# Patient Record
Sex: FEMALE | Race: WHITE | NOT HISPANIC OR LATINO | Employment: FULL TIME | URBAN - METROPOLITAN AREA
[De-identification: names, ages, dates, MRNs, and addresses within clinical notes are randomized per-mention and may not be internally consistent; named-entity substitution may affect disease eponyms.]

---

## 2019-04-23 ENCOUNTER — HISTORICAL (OUTPATIENT)
Dept: LAB | Facility: HOSPITAL | Age: 34
End: 2019-04-23

## 2019-04-23 ENCOUNTER — HISTORICAL (OUTPATIENT)
Dept: PREADMISSION TESTING | Facility: HOSPITAL | Age: 34
End: 2019-04-23

## 2019-04-23 LAB
ABS NEUT (OLG): 3.65 X10(3)/MCL (ref 2.1–9.2)
ALBUMIN SERPL-MCNC: 3.6 GM/DL (ref 3.4–5)
ALBUMIN/GLOB SERPL: 1 RATIO (ref 1.1–2)
ALP SERPL-CCNC: 35 UNIT/L (ref 38–126)
ALT SERPL-CCNC: 21 UNIT/L (ref 12–78)
APTT PPP: 30.9 SECOND(S) (ref 24.2–33.9)
AST SERPL-CCNC: 27 UNIT/L (ref 15–37)
BASOPHILS # BLD AUTO: 0 X10(3)/MCL (ref 0–0.2)
BASOPHILS NFR BLD AUTO: 0 %
BILIRUB SERPL-MCNC: 0.2 MG/DL (ref 0.2–1)
BILIRUBIN DIRECT+TOT PNL SERPL-MCNC: 0.1 MG/DL (ref 0–0.5)
BILIRUBIN DIRECT+TOT PNL SERPL-MCNC: 0.1 MG/DL (ref 0–0.8)
BUN SERPL-MCNC: 11 MG/DL (ref 7–18)
CALCIUM SERPL-MCNC: 9.2 MG/DL (ref 8.5–10.1)
CHLORIDE SERPL-SCNC: 105 MMOL/L (ref 98–107)
CO2 SERPL-SCNC: 27 MMOL/L (ref 21–32)
CREAT SERPL-MCNC: 0.72 MG/DL (ref 0.55–1.02)
EOSINOPHIL # BLD AUTO: 0.2 X10(3)/MCL (ref 0–0.9)
EOSINOPHIL NFR BLD AUTO: 3 %
ERYTHROCYTE [DISTWIDTH] IN BLOOD BY AUTOMATED COUNT: 11.9 % (ref 11.5–17)
GLOBULIN SER-MCNC: 3.7 GM/DL (ref 2.4–3.5)
GLUCOSE SERPL-MCNC: 70 MG/DL (ref 74–106)
HCT VFR BLD AUTO: 40.1 % (ref 37–47)
HGB BLD-MCNC: 13 GM/DL (ref 12–16)
INR PPP: 1 (ref 0–1.3)
LYMPHOCYTES # BLD AUTO: 2.6 X10(3)/MCL (ref 0.6–4.6)
LYMPHOCYTES NFR BLD AUTO: 38 %
MCH RBC QN AUTO: 29.5 PG (ref 27–31)
MCHC RBC AUTO-ENTMCNC: 32.4 GM/DL (ref 33–36)
MCV RBC AUTO: 90.9 FL (ref 80–94)
MONOCYTES # BLD AUTO: 0.5 X10(3)/MCL (ref 0.1–1.3)
MONOCYTES NFR BLD AUTO: 7 %
NEUTROPHILS # BLD AUTO: 3.65 X10(3)/MCL (ref 2.1–9.2)
NEUTROPHILS NFR BLD AUTO: 52 %
PLATELET # BLD AUTO: 254 X10(3)/MCL (ref 130–400)
PMV BLD AUTO: 9.4 FL (ref 9.4–12.4)
POTASSIUM SERPL-SCNC: 3.9 MMOL/L (ref 3.5–5.1)
PROT SERPL-MCNC: 7.3 GM/DL (ref 6.4–8.2)
PROTHROMBIN TIME: 13 SECOND(S) (ref 12–14)
RBC # BLD AUTO: 4.41 X10(6)/MCL (ref 4.2–5.4)
SODIUM SERPL-SCNC: 137 MMOL/L (ref 136–145)
WBC # SPEC AUTO: 7 X10(3)/MCL (ref 4.5–11.5)

## 2019-05-01 ENCOUNTER — HISTORICAL (OUTPATIENT)
Dept: LAB | Facility: HOSPITAL | Age: 34
End: 2019-05-01

## 2019-05-01 ENCOUNTER — HISTORICAL (OUTPATIENT)
Dept: ADMINISTRATIVE | Facility: HOSPITAL | Age: 34
End: 2019-05-01

## 2019-05-02 LAB
GRAM STN SPEC: NORMAL
GRAM STN SPEC: NORMAL

## 2019-05-03 LAB — FINAL CULTURE: NORMAL

## 2019-05-04 LAB
FINAL CULTURE: NO GROWTH
FINAL CULTURE: NORMAL
FINAL CULTURE: NORMAL

## 2019-06-02 LAB — FINAL CULTURE: NORMAL

## 2022-04-30 NOTE — OP NOTE
DATE OF SURGERY:        SURGEON:  Monty Moseley MD    PREOPERATIVE DIAGNOSES:    1. Nasal obstruction.   2. Chronic sinusitis.   3. Nasal polyposis.   4. Turbinate hypertrophy.    POSTOPERATIVE DIAGNOSES:    1. Nasal obstruction.   2. Chronic sinusitis.   3. Nasal polyposis.   4. Turbinate hypertrophy.    PROCEDURE:    1. Septoplasty.   2. Submucous resection of inferior nasal turbinates.   3. Bilateral complete ethmoidectomy, maxillary antrostomies, and sphenoidotomies with removal of contents and biopsies.    ASSISTANT:  Binta Salmon RN    PROCEDURE IN DETAIL:  The patient was brought to the operating room and placed in supine position.  After achieving general endotracheal anesthesia, a throat pack was placed in the oropharynx.  Eyes were protected with Lacri-Lube then 4% cocaine topical adrenaline was used to decongest the nose, and after a sufficient amount of time had lapsed for decongesting, 1% lidocaine with 1:100,000 epinephrine injected into the soft tissues of the nose.  The patient was then prepped and draped for surgery.  A navigation guidance and 0-degree endoscope was then used to examine the nasal cavity.  The left side showed a large polyp emanating from the middle turbinate, as did the right; however, the right was partially obscured by bony deviation of her septum.  Therefore, a septoplasty was performed.  The 15 blade was used to make a transfixion incision.  With use of Iris scissors, 15 blade, and [place] elevator, bilateral mucoperichondrial flaps were elevated overlying the area of the deviation.  A 15 blade was used to make an incision in the quadrilateral cartilage just anterior to the deep bony deviation, which was removed with __________ and Zenon Worley forceps then were used to remove the bony deviation with care being taken to leave appropriate dorsal and caudal support struts.  There was a spur on the floor of the nose on the right, which was removed with a floor  chisel.  Once the airway was improved, 4-0 chromic sutures in an interrupted fashion were used to close the transfixion incision, while 4-0 plain sutures on a Gerard needle were used to re-coapt the mucoperichondrial flaps.  Once this was completed, we turned our attention to the sinus surgery where with a 0-degree endoscope, the left middle turbinate, which was polypoid, was taken down with the shaver.  The middle turbinate then was in-fractured exposing the uncinate process, which was brought forward with a ball-tip seeker, and removed with backbiting forceps and the shaver.  A complete ethmoidectomy was done, removing diseased mucosa throughout the ethmoid cavity.  Maxillary sinus ostia was then identified and enlarged anteriorly with backbiting forceps and posterior Fabiano-Cut forceps, and a large mucous retention cyst/polyp was removed from the maxillary sinus on the left.  The nasal __________ was probed and noted to be intact.  The sphenoidotomy was done and a small amount of the diseased mucosa removed.  The same procedure was done on the opposite side with similar findings.  A Propel nasal stent was placed through the middle turbinate and lateral nasal walls bilaterally.  We then turned our attention to the nasal turbinates, which were in-fractured with Kendall elevator, hydrodissected with 1% lidocaine with 1:100,000 epinephrine.  With a sickle procedure, a stab incision on the anterior inferior edge and with the turbinate attachment to the shaver, submucous resection was done.  The turbinates were then crushed and outfractured.  A Rapid Rhino was placed along the inferior nasal turbinate while a small amount of Achromycin soaked Nu Gauze was used to pack the nose.  The throat pack was removed.  Pharynx carefully suctioned.  Eyes washed with BSS.  The patient tolerated the procedure well, was awakened and extubated in the operating room, and brought to the recovery room in stable  condition.        ______________________________  Monty Moseley MD    [facility]  DD:  05/01/2019  Time:  08:34PM  DT:  05/01/2019  Time:  09:15PM  Job #:  [id number]

## 2023-10-31 ENCOUNTER — OFFICE VISIT (OUTPATIENT)
Dept: FAMILY MEDICINE | Facility: CLINIC | Age: 38
End: 2023-10-31
Payer: COMMERCIAL

## 2023-10-31 VITALS
WEIGHT: 119 LBS | SYSTOLIC BLOOD PRESSURE: 104 MMHG | TEMPERATURE: 99 F | OXYGEN SATURATION: 100 % | RESPIRATION RATE: 16 BRPM | HEART RATE: 79 BPM | HEIGHT: 63 IN | BODY MASS INDEX: 21.09 KG/M2 | DIASTOLIC BLOOD PRESSURE: 72 MMHG

## 2023-10-31 DIAGNOSIS — Z00.00 ANNUAL PHYSICAL EXAM: ICD-10-CM

## 2023-10-31 DIAGNOSIS — G43.109 MIGRAINE WITH AURA AND WITHOUT STATUS MIGRAINOSUS, NOT INTRACTABLE: Primary | ICD-10-CM

## 2023-10-31 DIAGNOSIS — R68.82 LOW LIBIDO: ICD-10-CM

## 2023-10-31 DIAGNOSIS — Z11.4 SCREENING FOR HIV (HUMAN IMMUNODEFICIENCY VIRUS): ICD-10-CM

## 2023-10-31 DIAGNOSIS — F90.0 ATTENTION DEFICIT HYPERACTIVITY DISORDER (ADHD), PREDOMINANTLY INATTENTIVE TYPE: ICD-10-CM

## 2023-10-31 DIAGNOSIS — Z11.59 NEED FOR HEPATITIS C SCREENING TEST: ICD-10-CM

## 2023-10-31 DIAGNOSIS — R03.0 ELEVATED BLOOD PRESSURE READING: ICD-10-CM

## 2023-10-31 PROBLEM — F41.1 ANXIETY STATE: Status: ACTIVE | Noted: 2023-10-31

## 2023-10-31 PROCEDURE — 3008F PR BODY MASS INDEX (BMI) DOCUMENTED: ICD-10-PCS | Mod: CPTII,,, | Performed by: NURSE PRACTITIONER

## 2023-10-31 PROCEDURE — 1159F PR MEDICATION LIST DOCUMENTED IN MEDICAL RECORD: ICD-10-PCS | Mod: CPTII,,, | Performed by: NURSE PRACTITIONER

## 2023-10-31 PROCEDURE — 3008F BODY MASS INDEX DOCD: CPT | Mod: CPTII,,, | Performed by: NURSE PRACTITIONER

## 2023-10-31 PROCEDURE — 99203 PR OFFICE/OUTPT VISIT, NEW, LEVL III, 30-44 MIN: ICD-10-PCS | Mod: ,,, | Performed by: NURSE PRACTITIONER

## 2023-10-31 PROCEDURE — 3078F PR MOST RECENT DIASTOLIC BLOOD PRESSURE < 80 MM HG: ICD-10-PCS | Mod: CPTII,,, | Performed by: NURSE PRACTITIONER

## 2023-10-31 PROCEDURE — 1160F RVW MEDS BY RX/DR IN RCRD: CPT | Mod: CPTII,,, | Performed by: NURSE PRACTITIONER

## 2023-10-31 PROCEDURE — 3074F SYST BP LT 130 MM HG: CPT | Mod: CPTII,,, | Performed by: NURSE PRACTITIONER

## 2023-10-31 PROCEDURE — 99203 OFFICE O/P NEW LOW 30 MIN: CPT | Mod: ,,, | Performed by: NURSE PRACTITIONER

## 2023-10-31 PROCEDURE — 1160F PR REVIEW ALL MEDS BY PRESCRIBER/CLIN PHARMACIST DOCUMENTED: ICD-10-PCS | Mod: CPTII,,, | Performed by: NURSE PRACTITIONER

## 2023-10-31 PROCEDURE — 3074F PR MOST RECENT SYSTOLIC BLOOD PRESSURE < 130 MM HG: ICD-10-PCS | Mod: CPTII,,, | Performed by: NURSE PRACTITIONER

## 2023-10-31 PROCEDURE — 1159F MED LIST DOCD IN RCRD: CPT | Mod: CPTII,,, | Performed by: NURSE PRACTITIONER

## 2023-10-31 PROCEDURE — 3078F DIAST BP <80 MM HG: CPT | Mod: CPTII,,, | Performed by: NURSE PRACTITIONER

## 2023-10-31 RX ORDER — DEXTROAMPHETAMINE SACCHARATE, AMPHETAMINE ASPARTATE, DEXTROAMPHETAMINE SULFATE AND AMPHETAMINE SULFATE 5; 5; 5; 5 MG/1; MG/1; MG/1; MG/1
1 TABLET ORAL 3 TIMES DAILY
COMMUNITY
Start: 2023-10-16 | End: 2024-01-02 | Stop reason: SDUPTHER

## 2023-10-31 RX ORDER — ZOLMITRIPTAN 5 MG/1
5 TABLET, FILM COATED ORAL
Qty: 12 TABLET | Refills: 5 | Status: SHIPPED | OUTPATIENT
Start: 2023-10-31 | End: 2023-12-05

## 2023-10-31 RX ORDER — NORETHINDRONE ACETATE AND ETHINYL ESTRADIOL, AND FERROUS FUMARATE 1MG-20(24)
1 KIT ORAL
COMMUNITY
Start: 2023-10-26 | End: 2024-03-25

## 2023-10-31 RX ORDER — AMLODIPINE BESYLATE 5 MG/1
5 TABLET ORAL
COMMUNITY
Start: 2023-09-14 | End: 2023-10-31

## 2023-10-31 RX ORDER — ZOLMITRIPTAN 5 MG/1
5 TABLET, FILM COATED ORAL
COMMUNITY
Start: 2023-10-16 | End: 2023-10-31 | Stop reason: SDUPTHER

## 2023-10-31 NOTE — LETTER
AUTHORIZATION FOR RELEASE OF   CONFIDENTIAL INFORMATION    Dear Dr. Varma,    We are seeing Joo Haywood, date of birth 1985, in the clinic at [facility]. Alyssa Eddy FNP is the patient's PCP. Joo Haywood has an outstanding lab/procedure at the time we reviewed her chart. In order to help keep her health information updated, she has authorized us to request the following medical record(s):        (  )  MAMMOGRAM                                      (  )  COLONOSCOPY      (X)  PAP SMEAR                                          (X)  OUTSIDE LAB RESULTS     (  )  DEXA SCAN                                          (  )  EYE EXAM            (  )  FOOT EXAM                                          (  )  ENTIRE RECORD     (  )  OUTSIDE IMMUNIZATIONS                 (X)  LAST OFFICE NOTE         Please fax records to [facility], [place], Kathryn F., FNP, [phone]     If you have any questions, please contact us at [phone].        Patient Name: Joo Haywood  : 1985  Patient Phone #: [phone]

## 2023-10-31 NOTE — ASSESSMENT & PLAN NOTE
Refill Zomig today.  Instructed to check her blood pressure the next time she has a migraine prior to taking any medication.  If blood pressure is elevated at all, she is instructed to take sample of either Nurtec or Ubrelvy instead of her Zomig.  Will follow-up in one-month.

## 2023-10-31 NOTE — PROGRESS NOTES
Subjective:       Patient ID: Joo Haywood is a 38 y.o. female.    Chief Complaint: Establish Care and Migraine (Migraines for years, but with the last 2 migraines her BP was elevated and migraine medications did not help.)      HPI   This is a 38-year-old white female who presents to clinic today to establish care.  Patient has an active problem list which includes migraines, ADHD, anxiety.  Reports that a little over a month ago, she had a really bad headache and took her migraine medication in the headache would not go away.  She went to urgent care and blood pressure was really high.  They treated her migraine and her blood pressure got better.  This happened again a few weeks later and they gave her blood pressure medication at the clinic.  Patient reports that she started taking the blood pressure medication on a daily basis and then began having low blood pressures with dizzy and weak spells so she stopped it.  Review of Systems  Comprehensive review of systems negative except as stated in HPI    The patient's Health Maintenance was reviewed and the following appears to be due:   Health Maintenance Due   Topic Date Due    Hepatitis C Screening  Never done    Cervical Cancer Screening  Never done    Lipid Panel  Never done    HIV Screening  Never done       Past Medical History:  Past Medical History:   Diagnosis Date    ADHD (attention deficit hyperactivity disorder)     Migraine      Past Surgical History:   Procedure Laterality Date    CERVICAL CONIZATION   W/ LASER  2013    FESS, USING COMPUTER-ASSISTED NAVIGATION, WITH NASAL TURBINATE REDUCTION  2021    Dr. Stevens    FUNCTIONAL ENDOSCOPIC SINUS SURGERY (FESS)       Review of patient's allergies indicates:  No Known Allergies  Current Outpatient Medications on File Prior to Visit   Medication Sig Dispense Refill    dextroamphetamine-amphetamine (ADDERALL) 20 mg tablet Take 1 tablet by mouth 3 (three) times daily.      KEV Ibrahim FE 1 mg-20 mcg  "(24)/75 mg (4) per tablet Take 1 tablet by mouth.      [DISCONTINUED] amLODIPine (NORVASC) 5 MG tablet Take 5 mg by mouth.      [DISCONTINUED] ZOLMitriptan (ZOMIG) 5 MG tablet Take 5 mg by mouth.       No current facility-administered medications on file prior to visit.     Social History     Socioeconomic History    Marital status: Single   Tobacco Use    Smoking status: Never     Passive exposure: Never    Smokeless tobacco: Never   Substance and Sexual Activity    Alcohol use: Not Currently    Drug use: Never    Sexual activity: Yes     Partners: Male     Birth control/protection: OCP     Family History   Problem Relation Age of Onset    Ulcerative colitis Mother     Heart disease Father        Objective:       /72 (BP Location: Left arm)   Pulse 79   Temp 99.3 °F (37.4 °C) (Temporal)   Resp 16   Ht 5' 3" (1.6 m)   Wt 54 kg (119 lb)   SpO2 100%   BMI 21.08 kg/m²      Physical Exam  Vitals and nursing note reviewed.   Constitutional:       Appearance: Normal appearance.   HENT:      Head: Normocephalic and atraumatic.      Right Ear: Tympanic membrane, ear canal and external ear normal.      Left Ear: Tympanic membrane, ear canal and external ear normal.      Nose: Nose normal.      Mouth/Throat:      Mouth: Mucous membranes are moist.      Pharynx: Oropharynx is clear.   Eyes:      Extraocular Movements: Extraocular movements intact.      Conjunctiva/sclera: Conjunctivae normal.      Pupils: Pupils are equal, round, and reactive to light.   Cardiovascular:      Rate and Rhythm: Normal rate and regular rhythm.      Heart sounds: Normal heart sounds.   Pulmonary:      Effort: Pulmonary effort is normal.      Breath sounds: Normal breath sounds.   Abdominal:      General: Abdomen is flat. Bowel sounds are normal.      Palpations: Abdomen is soft.   Musculoskeletal:         General: Normal range of motion.      Cervical back: Normal range of motion and neck supple.   Skin:     General: Skin is warm and " dry.   Neurological:      General: No focal deficit present.      Mental Status: She is alert and oriented to person, place, and time.   Psychiatric:         Mood and Affect: Mood normal.         Behavior: Behavior normal.         Thought Content: Thought content normal.         Judgment: Judgment normal.         Labs  No visits with results within 6 Month(s) from this visit.   Latest known visit with results is:   Historical on 05/01/2019   Component Date Value Ref Range Status    FINAL CULTURE 05/01/2019 Rare normal simba   Final    FINAL CULTURE 05/01/2019 No growth of Anaerobes   Final    FINAL CULTURE 05/01/2019 Final Report:  At 4 weeks, No Fungus isolated   Final    FINAL CULTURE 05/01/2019 No growth of Anaerobes   Final    FINAL CULTURE 05/01/2019 No growth   Final    GRAM STAIN 05/01/2019 No WBCs, No bacteria seen   Final    GRAM STAIN 05/01/2019 No WBCs, No bacteria seen   Final       Assessment and Plan       ICD-10-CM ICD-9-CM   1. Migraine with aura and without status migrainosus, not intractable  G43.109 346.00   2. Attention deficit hyperactivity disorder (ADHD), predominantly inattentive type  F90.0 314.00   3. Annual physical exam  Z00.00 V70.0   4. Low libido  R68.82 799.81   5. Need for hepatitis C screening test  Z11.59 V73.89   6. Screening for HIV (human immunodeficiency virus)  Z11.4 V73.89   7. Elevated blood pressure reading  R03.0 796.2        1. Migraine with aura and without status migrainosus, not intractable  Overview:  Diagnosed in her 20's by Dr Tabby Manrique not covered by insurance but worked  Imitrex did not help   On Zomig 5 mg as needed     Assessment & Plan:  Refill Zomig today.  Instructed to check her blood pressure the next time she has a migraine prior to taking any medication.  If blood pressure is elevated at all, she is instructed to take sample of either Nurtec or Ubrelvy instead of her Zomig.  Will follow-up in one-month.    Orders:  -     ZOLMitriptan (ZOMIG) 5 MG  tablet; Take 1 tablet (5 mg total) by mouth as needed for Migraine.  Dispense: 12 tablet; Refill: 5    2. Attention deficit hyperactivity disorder (ADHD), predominantly inattentive type  Overview:  DX age 22   Adderall since diagnosis   Adderall 20 mg TID  Managed by Dena Rico, PhD psychology     Assessment & Plan:  Stable on Adderall now for many years, continue follow-up with Psychology.      3. Annual physical exam  -     CBC Auto Differential; Future; Expected date: 10/31/2023  -     Comprehensive Metabolic Panel; Future; Expected date: 10/31/2023  -     Lipid Panel; Future; Expected date: 10/31/2023  -     TSH; Future; Expected date: 10/31/2023  -     Hemoglobin A1C; Future; Expected date: 10/31/2023    4. Low libido  Assessment & Plan:  Will check hormone levels with wellness in about a month.    Orders:  -     Testosterone; Future; Expected date: 10/31/2023  -     Estradiol; Future; Expected date: 10/31/2023  -     Luteinizing Hormone; Future; Expected date: 10/31/2023  -     Follicle Stimulating Hormone; Future; Expected date: 10/31/2023    5. Need for hepatitis C screening test  -     Hepatitis C Antibody; Future; Expected date: 10/31/2023    6. Screening for HIV (human immunodeficiency virus)  -     HIV 1/2 Ag/Ab (4th Gen); Future; Expected date: 10/31/2023    7. Elevated blood pressure reading  Assessment & Plan:  Instructed to monitor blood pressure daily at work and record.  Bring blood pressure log with her to visit in one-month.             Follow up in about 4 weeks (around 11/28/2023) for Annual.

## 2023-10-31 NOTE — ASSESSMENT & PLAN NOTE
Instructed to monitor blood pressure daily at work and record.  Bring blood pressure log with her to visit in one-month.

## 2023-11-21 ENCOUNTER — TELEPHONE (OUTPATIENT)
Dept: FAMILY MEDICINE | Facility: CLINIC | Age: 38
End: 2023-11-21
Payer: COMMERCIAL

## 2023-11-21 NOTE — TELEPHONE ENCOUNTER
No answer on 11/21/23 at 9:30.  Left message reminding patient about her upcoming visit with labs needed prior to visit.

## 2023-11-28 ENCOUNTER — TELEPHONE (OUTPATIENT)
Dept: FAMILY MEDICINE | Facility: CLINIC | Age: 38
End: 2023-11-28
Payer: COMMERCIAL

## 2023-11-30 ENCOUNTER — LAB VISIT (OUTPATIENT)
Dept: LAB | Facility: HOSPITAL | Age: 38
End: 2023-11-30
Attending: NURSE PRACTITIONER
Payer: COMMERCIAL

## 2023-11-30 DIAGNOSIS — R68.82 LOW LIBIDO: ICD-10-CM

## 2023-11-30 DIAGNOSIS — Z00.00 ANNUAL PHYSICAL EXAM: ICD-10-CM

## 2023-11-30 DIAGNOSIS — Z11.4 SCREENING FOR HIV (HUMAN IMMUNODEFICIENCY VIRUS): ICD-10-CM

## 2023-11-30 DIAGNOSIS — Z11.59 NEED FOR HEPATITIS C SCREENING TEST: ICD-10-CM

## 2023-11-30 LAB
ALBUMIN SERPL-MCNC: 3.8 G/DL (ref 3.5–5)
ALBUMIN/GLOB SERPL: 1.1 RATIO (ref 1.1–2)
ALP SERPL-CCNC: 36 UNIT/L (ref 40–150)
ALT SERPL-CCNC: 11 UNIT/L (ref 0–55)
AST SERPL-CCNC: 23 UNIT/L (ref 5–34)
BASOPHILS # BLD AUTO: 0.01 X10(3)/MCL
BASOPHILS NFR BLD AUTO: 0.2 %
BILIRUB SERPL-MCNC: 0.4 MG/DL
BUN SERPL-MCNC: 10.3 MG/DL (ref 7–18.7)
CALCIUM SERPL-MCNC: 9.4 MG/DL (ref 8.4–10.2)
CHLORIDE SERPL-SCNC: 107 MMOL/L (ref 98–107)
CHOLEST SERPL-MCNC: 167 MG/DL
CHOLEST/HDLC SERPL: 3 {RATIO} (ref 0–5)
CO2 SERPL-SCNC: 23 MMOL/L (ref 22–29)
CREAT SERPL-MCNC: 0.83 MG/DL (ref 0.55–1.02)
EOSINOPHIL # BLD AUTO: 0.29 X10(3)/MCL (ref 0–0.9)
EOSINOPHIL NFR BLD AUTO: 5.5 %
ERYTHROCYTE [DISTWIDTH] IN BLOOD BY AUTOMATED COUNT: 12.5 % (ref 11.5–17)
EST. AVERAGE GLUCOSE BLD GHB EST-MCNC: 116.9 MG/DL
ESTRADIOL SERPL HS-MCNC: <24 PG/ML
FSH SERPL-ACNC: 2.01 MIU/ML
GFR SERPLBLD CREATININE-BSD FMLA CKD-EPI: >60 MLS/MIN/1.73/M2
GLOBULIN SER-MCNC: 3.6 GM/DL (ref 2.4–3.5)
GLUCOSE SERPL-MCNC: 102 MG/DL (ref 74–100)
HBA1C MFR BLD: 5.7 %
HCT VFR BLD AUTO: 43.9 % (ref 37–47)
HCV AB SERPL QL IA: NONREACTIVE
HDLC SERPL-MCNC: 52 MG/DL (ref 35–60)
HGB BLD-MCNC: 13.8 G/DL (ref 12–16)
HIV 1+2 AB+HIV1 P24 AG SERPL QL IA: NONREACTIVE
IMM GRANULOCYTES # BLD AUTO: 0 X10(3)/MCL (ref 0–0.04)
IMM GRANULOCYTES NFR BLD AUTO: 0 %
LDLC SERPL CALC-MCNC: 93 MG/DL (ref 50–140)
LH SERPL-ACNC: 0.69 MIU/ML
LYMPHOCYTES # BLD AUTO: 1.86 X10(3)/MCL (ref 0.6–4.6)
LYMPHOCYTES NFR BLD AUTO: 35.1 %
MCH RBC QN AUTO: 28.8 PG (ref 27–31)
MCHC RBC AUTO-ENTMCNC: 31.4 G/DL (ref 33–36)
MCV RBC AUTO: 91.5 FL (ref 80–94)
MONOCYTES # BLD AUTO: 0.24 X10(3)/MCL (ref 0.1–1.3)
MONOCYTES NFR BLD AUTO: 4.5 %
NEUTROPHILS # BLD AUTO: 2.9 X10(3)/MCL (ref 2.1–9.2)
NEUTROPHILS NFR BLD AUTO: 54.7 %
PLATELET # BLD AUTO: 273 X10(3)/MCL (ref 130–400)
PMV BLD AUTO: 8.8 FL (ref 7.4–10.4)
POTASSIUM SERPL-SCNC: 4.1 MMOL/L (ref 3.5–5.1)
PROT SERPL-MCNC: 7.4 GM/DL (ref 6.4–8.3)
RBC # BLD AUTO: 4.8 X10(6)/MCL (ref 4.2–5.4)
SODIUM SERPL-SCNC: 140 MMOL/L (ref 136–145)
TESTOST SERPL-MCNC: 44.34 NG/DL (ref 13.84–53.35)
TRIGL SERPL-MCNC: 111 MG/DL (ref 37–140)
TSH SERPL-ACNC: 1.58 UIU/ML (ref 0.35–4.94)
VLDLC SERPL CALC-MCNC: 22 MG/DL
WBC # SPEC AUTO: 5.3 X10(3)/MCL (ref 4.5–11.5)

## 2023-11-30 PROCEDURE — 85025 COMPLETE CBC W/AUTO DIFF WBC: CPT

## 2023-11-30 PROCEDURE — 80053 COMPREHEN METABOLIC PANEL: CPT

## 2023-11-30 PROCEDURE — 87389 HIV-1 AG W/HIV-1&-2 AB AG IA: CPT

## 2023-11-30 PROCEDURE — 36415 COLL VENOUS BLD VENIPUNCTURE: CPT

## 2023-11-30 PROCEDURE — 80061 LIPID PANEL: CPT

## 2023-11-30 PROCEDURE — 83001 ASSAY OF GONADOTROPIN (FSH): CPT

## 2023-11-30 PROCEDURE — 86803 HEPATITIS C AB TEST: CPT

## 2023-11-30 PROCEDURE — 82670 ASSAY OF TOTAL ESTRADIOL: CPT

## 2023-11-30 PROCEDURE — 83002 ASSAY OF GONADOTROPIN (LH): CPT

## 2023-11-30 PROCEDURE — 84443 ASSAY THYROID STIM HORMONE: CPT

## 2023-11-30 PROCEDURE — 83036 HEMOGLOBIN GLYCOSYLATED A1C: CPT

## 2023-11-30 PROCEDURE — 84403 ASSAY OF TOTAL TESTOSTERONE: CPT

## 2023-12-05 ENCOUNTER — OFFICE VISIT (OUTPATIENT)
Dept: FAMILY MEDICINE | Facility: CLINIC | Age: 38
End: 2023-12-05
Payer: COMMERCIAL

## 2023-12-05 VITALS
BODY MASS INDEX: 21.3 KG/M2 | DIASTOLIC BLOOD PRESSURE: 82 MMHG | TEMPERATURE: 98 F | WEIGHT: 120.19 LBS | OXYGEN SATURATION: 99 % | RESPIRATION RATE: 16 BRPM | HEART RATE: 61 BPM | HEIGHT: 63 IN | SYSTOLIC BLOOD PRESSURE: 128 MMHG

## 2023-12-05 DIAGNOSIS — R73.03 PREDIABETES: ICD-10-CM

## 2023-12-05 DIAGNOSIS — Z12.31 BREAST CANCER SCREENING BY MAMMOGRAM: ICD-10-CM

## 2023-12-05 DIAGNOSIS — Z12.4 CERVICAL CANCER SCREENING: ICD-10-CM

## 2023-12-05 DIAGNOSIS — R03.0 ELEVATED BLOOD PRESSURE READING: ICD-10-CM

## 2023-12-05 DIAGNOSIS — Z00.00 ANNUAL PHYSICAL EXAM: Primary | ICD-10-CM

## 2023-12-05 DIAGNOSIS — F90.0 ATTENTION DEFICIT HYPERACTIVITY DISORDER (ADHD), PREDOMINANTLY INATTENTIVE TYPE: ICD-10-CM

## 2023-12-05 DIAGNOSIS — G43.109 MIGRAINE WITH AURA AND WITHOUT STATUS MIGRAINOSUS, NOT INTRACTABLE: ICD-10-CM

## 2023-12-05 DIAGNOSIS — Z11.59 NEED FOR HEPATITIS C SCREENING TEST: ICD-10-CM

## 2023-12-05 DIAGNOSIS — R68.82 LOW LIBIDO: ICD-10-CM

## 2023-12-05 DIAGNOSIS — Z11.4 SCREENING FOR HIV (HUMAN IMMUNODEFICIENCY VIRUS): ICD-10-CM

## 2023-12-05 PROCEDURE — 1159F MED LIST DOCD IN RCRD: CPT | Mod: CPTII,,, | Performed by: NURSE PRACTITIONER

## 2023-12-05 PROCEDURE — 3074F PR MOST RECENT SYSTOLIC BLOOD PRESSURE < 130 MM HG: ICD-10-PCS | Mod: CPTII,,, | Performed by: NURSE PRACTITIONER

## 2023-12-05 PROCEDURE — 3079F DIAST BP 80-89 MM HG: CPT | Mod: CPTII,,, | Performed by: NURSE PRACTITIONER

## 2023-12-05 PROCEDURE — 3074F SYST BP LT 130 MM HG: CPT | Mod: CPTII,,, | Performed by: NURSE PRACTITIONER

## 2023-12-05 PROCEDURE — 3079F PR MOST RECENT DIASTOLIC BLOOD PRESSURE 80-89 MM HG: ICD-10-PCS | Mod: CPTII,,, | Performed by: NURSE PRACTITIONER

## 2023-12-05 PROCEDURE — 1160F PR REVIEW ALL MEDS BY PRESCRIBER/CLIN PHARMACIST DOCUMENTED: ICD-10-PCS | Mod: CPTII,,, | Performed by: NURSE PRACTITIONER

## 2023-12-05 PROCEDURE — 99395 PR PREVENTIVE VISIT,EST,18-39: ICD-10-PCS | Mod: ,,, | Performed by: NURSE PRACTITIONER

## 2023-12-05 PROCEDURE — 99395 PREV VISIT EST AGE 18-39: CPT | Mod: ,,, | Performed by: NURSE PRACTITIONER

## 2023-12-05 PROCEDURE — 1159F PR MEDICATION LIST DOCUMENTED IN MEDICAL RECORD: ICD-10-PCS | Mod: CPTII,,, | Performed by: NURSE PRACTITIONER

## 2023-12-05 PROCEDURE — 1160F RVW MEDS BY RX/DR IN RCRD: CPT | Mod: CPTII,,, | Performed by: NURSE PRACTITIONER

## 2023-12-05 PROCEDURE — 3008F BODY MASS INDEX DOCD: CPT | Mod: CPTII,,, | Performed by: NURSE PRACTITIONER

## 2023-12-05 PROCEDURE — 3008F PR BODY MASS INDEX (BMI) DOCUMENTED: ICD-10-PCS | Mod: CPTII,,, | Performed by: NURSE PRACTITIONER

## 2023-12-05 PROCEDURE — 3044F HG A1C LEVEL LT 7.0%: CPT | Mod: CPTII,,, | Performed by: NURSE PRACTITIONER

## 2023-12-05 PROCEDURE — 3044F PR MOST RECENT HEMOGLOBIN A1C LEVEL <7.0%: ICD-10-PCS | Mod: CPTII,,, | Performed by: NURSE PRACTITIONER

## 2023-12-05 RX ORDER — RIMEGEPANT SULFATE 75 MG/75MG
75 TABLET, ORALLY DISINTEGRATING ORAL DAILY PRN
Qty: 16 TABLET | Refills: 11 | Status: SHIPPED | OUTPATIENT
Start: 2023-12-05

## 2023-12-05 NOTE — LETTER
AUTHORIZATION FOR RELEASE OF   CONFIDENTIAL INFORMATION    Dear Dr. Varma,    We are seeing Joo Haywood, date of birth 1985, in the clinic at [facility]. Alyssa Eddy FNP is the patient's PCP. Joo Haywood has an outstanding lab/procedure at the time we reviewed her chart. In order to help keep her health information updated, she has authorized us to request the following medical record(s):        (  )  MAMMOGRAM                                      (  )  COLONOSCOPY      ( x )  PAP SMEAR                                          (  )  OUTSIDE LAB RESULTS     (  )  DEXA SCAN                                          (  )  EYE EXAM            (  )  FOOT EXAM                                          (  )  ENTIRE RECORD     (  )  OUTSIDE IMMUNIZATIONS                 (  )  _______________         Please fax records to [facility], [place], Kathryn F., [place], [phone]          Patient Name: Joo Haywood  : 1985  Patient Phone #: [phone]

## 2023-12-05 NOTE — ASSESSMENT & PLAN NOTE
Patient states she tried the samples of Nurtec and a worked extremely well.  Had complete resolve meant of all migraine symptoms within an hour.  Instructed to discontinue the Zomig and start Nurtec as needed for abortive therapy.  Follow-up 1 year.

## 2023-12-05 NOTE — ASSESSMENT & PLAN NOTE
Managed by psych.  Did discuss with patient that she can see me every 3 months for medication management if she would prefer, patient will think about it and let me know.

## 2023-12-05 NOTE — PROGRESS NOTES
Subjective:       Patient ID: Joo Haywood is a 38 y.o. female.    Chief Complaint: Annual Exam      HPI   This is a 38-year-old white female who presents to clinic today for an annual wellness exam.  Patient reports that the Nurtec really helped her migraine.  The Ubrelvy did not help at all.    Review of Systems  Comprehensive review of systems negative except as stated in HPI    The patient's Health Maintenance was reviewed and the following appears to be due:   Health Maintenance Due   Topic Date Due    Cervical Cancer Screening  Never done       Past Medical History:  Past Medical History:   Diagnosis Date    ADHD (attention deficit hyperactivity disorder)     Migraine      Past Surgical History:   Procedure Laterality Date    CERVICAL CONIZATION   W/ LASER  2013    FESS, USING COMPUTER-ASSISTED NAVIGATION, WITH NASAL TURBINATE REDUCTION  2021    Dr. Stevens    FUNCTIONAL ENDOSCOPIC SINUS SURGERY (FESS)       Review of patient's allergies indicates:  No Known Allergies  Current Outpatient Medications on File Prior to Visit   Medication Sig Dispense Refill    dextroamphetamine-amphetamine (ADDERALL) 20 mg tablet Take 1 tablet by mouth 3 (three) times daily.      KEV 24 FE 1 mg-20 mcg (24)/75 mg (4) per tablet Take 1 tablet by mouth.      [DISCONTINUED] ZOLMitriptan (ZOMIG) 5 MG tablet Take 1 tablet (5 mg total) by mouth as needed for Migraine. 12 tablet 5     No current facility-administered medications on file prior to visit.     Social History     Socioeconomic History    Marital status: Single   Tobacco Use    Smoking status: Never     Passive exposure: Never    Smokeless tobacco: Never   Substance and Sexual Activity    Alcohol use: Not Currently    Drug use: Never    Sexual activity: Yes     Partners: Male     Birth control/protection: OCP     Social Determinants of Health     Financial Resource Strain: Low Risk  (12/5/2023)    Overall Financial Resource Strain (CARDIA)     Difficulty of Paying  "Living Expenses: Not very hard   Food Insecurity: No Food Insecurity (12/5/2023)    Hunger Vital Sign     Worried About Running Out of Food in the Last Year: Never true     Ran Out of Food in the Last Year: Never true   Transportation Needs: No Transportation Needs (12/5/2023)    PRAPARE - Transportation     Lack of Transportation (Medical): No     Lack of Transportation (Non-Medical): No   Physical Activity: Inactive (12/5/2023)    Exercise Vital Sign     Days of Exercise per Week: 0 days     Minutes of Exercise per Session: 0 min   Stress: Stress Concern Present (12/5/2023)    [employer] - Occupational Stress Questionnaire     Feeling of Stress : To some extent   Social Connections: Moderately Isolated (12/5/2023)    Social Connection and Isolation Panel [NHANES]     Frequency of Communication with Friends and Family: More than three times a week     Frequency of Social Gatherings with Friends and Family: More than three times a week     Attends Zoroastrianism Services: Never     Active Member of Clubs or Organizations: No     Attends Club or Organization Meetings: 1 to 4 times per year     Marital Status:    Housing Stability: Low Risk  (12/5/2023)    Housing Stability Vital Sign     Unable to Pay for Housing in the Last Year: No     Number of Places Lived in the Last Year: 1     Unstable Housing in the Last Year: No     Family History   Problem Relation Age of Onset    Ulcerative colitis Mother     Heart disease Father        Objective:       /82   Pulse 61   Temp 98.2 °F (36.8 °C)   Resp 16   Ht 5' 3" (1.6 m)   Wt 54.5 kg (120 lb 3.2 oz)   SpO2 99%   BMI 21.29 kg/m²      Physical Exam  Vitals and nursing note reviewed.   Constitutional:       Appearance: Normal appearance. She is normal weight.   HENT:      Head: Normocephalic and atraumatic.      Right Ear: Tympanic membrane, ear canal and external ear normal.      Left Ear: Tympanic membrane, ear canal and " external ear normal.      Nose: Nose normal.      Mouth/Throat:      Mouth: Mucous membranes are moist.      Pharynx: Oropharynx is clear.   Eyes:      Extraocular Movements: Extraocular movements intact.      Conjunctiva/sclera: Conjunctivae normal.      Pupils: Pupils are equal, round, and reactive to light.   Cardiovascular:      Rate and Rhythm: Normal rate and regular rhythm.      Heart sounds: Normal heart sounds.   Pulmonary:      Effort: Pulmonary effort is normal.      Breath sounds: Normal breath sounds.   Abdominal:      General: Abdomen is flat. Bowel sounds are normal.      Palpations: Abdomen is soft.   Musculoskeletal:         General: Normal range of motion.      Cervical back: Normal range of motion and neck supple.   Skin:     General: Skin is warm and dry.   Neurological:      General: No focal deficit present.      Mental Status: She is alert and oriented to person, place, and time.   Psychiatric:         Mood and Affect: Mood normal.         Behavior: Behavior normal.         Thought Content: Thought content normal.         Judgment: Judgment normal.         Labs  Lab Visit on 11/30/2023   Component Date Value Ref Range Status    Sodium Level 11/30/2023 140  136 - 145 mmol/L Final    Potassium Level 11/30/2023 4.1  3.5 - 5.1 mmol/L Final    Chloride 11/30/2023 107  98 - 107 mmol/L Final    Carbon Dioxide 11/30/2023 23  22 - 29 mmol/L Final    Glucose Level 11/30/2023 102 (H)  74 - 100 mg/dL Final    Blood Urea Nitrogen 11/30/2023 10.3  7.0 - 18.7 mg/dL Final    Creatinine 11/30/2023 0.83  0.55 - 1.02 mg/dL Final    Calcium Level Total 11/30/2023 9.4  8.4 - 10.2 mg/dL Final    Protein Total 11/30/2023 7.4  6.4 - 8.3 gm/dL Final    Albumin Level 11/30/2023 3.8  3.5 - 5.0 g/dL Final    Globulin 11/30/2023 3.6 (H)  2.4 - 3.5 gm/dL Final    Albumin/Globulin Ratio 11/30/2023 1.1  1.1 - 2.0 ratio Final    Bilirubin Total 11/30/2023 0.4  <=1.5 mg/dL Final    Alkaline Phosphatase 11/30/2023 36 (L)  40 -  150 unit/L Final    Alanine Aminotransferase 11/30/2023 11  0 - 55 unit/L Final    Aspartate Aminotransferase 11/30/2023 23  5 - 34 unit/L Final    eGFR 11/30/2023 >60  mls/min/1.73/m2 Final    Cholesterol Total 11/30/2023 167  <=200 mg/dL Final    HDL Cholesterol 11/30/2023 52  35 - 60 mg/dL Final    Triglyceride 11/30/2023 111  37 - 140 mg/dL Final    Cholesterol/HDL Ratio 11/30/2023 3  0 - 5 Final    Very Low Density Lipoprotein 11/30/2023 22   Final    LDL Cholesterol 11/30/2023 93.00  50.00 - 140.00 mg/dL Final    TSH 11/30/2023 1.584  0.350 - 4.940 uIU/mL Final    Hemoglobin A1c 11/30/2023 5.7  <=7.0 % Final    Estimated Average Glucose 11/30/2023 116.9  mg/dL Final    Hep C Ab Interp 11/30/2023 Nonreactive  Nonreactive Final    HIV 11/30/2023 Nonreactive  Nonreactive Final    Testosterone Total 11/30/2023 44.34  13.84 - 53.35 ng/dL Final    Estradiol Level 11/30/2023 <24  pg/mL Final    Estradiol Expected Values:    Menstruating females:     Follicular phase    pg/ml     Mid-cycle phase    pg/ml     Luteal phase    pg/ml    Post-menopausal females:     On Menopausal Hormone Therapy (MHT) < pg/ml     Untreated    <10-28 pg/ml    Males    11-44 pg/ml      Luteinizing Hormone 11/30/2023 0.69  mIU/mL Final    Luteinizing Hormone Expected Values:    Normal Menstruating Females:    Follicular Phase:   1.8-11.78   Mid-cycle Phase:   7.59-89.08   Luteal Phase:   0.56-14.00    Postmenopausal Females:   Without HRT:   5.16-61.99    Males     0.57-12.07               Follicle Stimulating Hormone 11/30/2023 2.01  mIU/mL Final    Follicle Stimulating Hormone Expected Values:      Normal Menstruating Females:  Follicular    3.03-8.08  Mid Cycle Peak  2.55-16.69  Luteal Phase  1.38-5.47    Postmenopausal Females: 26..41    Males   0.95 - 11.95        WBC 11/30/2023 5.30  4.50 - 11.50 x10(3)/mcL Final    RBC 11/30/2023 4.80  4.20 - 5.40 x10(6)/mcL Final    Hgb 11/30/2023 13.8  12.0 - 16.0 g/dL  Final    Hct 11/30/2023 43.9  37.0 - 47.0 % Final    MCV 11/30/2023 91.5  80.0 - 94.0 fL Final    MCH 11/30/2023 28.8  27.0 - 31.0 pg Final    MCHC 11/30/2023 31.4 (L)  33.0 - 36.0 g/dL Final    RDW 11/30/2023 12.5  11.5 - 17.0 % Final    Platelet 11/30/2023 273  130 - 400 x10(3)/mcL Final    MPV 11/30/2023 8.8  7.4 - 10.4 fL Final    Neut % 11/30/2023 54.7  % Final    Lymph % 11/30/2023 35.1  % Final    Mono % 11/30/2023 4.5  % Final    Eos % 11/30/2023 5.5  % Final    Basophil % 11/30/2023 0.2  % Final    Lymph # 11/30/2023 1.86  0.6 - 4.6 x10(3)/mcL Final    Neut # 11/30/2023 2.90  2.1 - 9.2 x10(3)/mcL Final    Mono # 11/30/2023 0.24  0.1 - 1.3 x10(3)/mcL Final    Eos # 11/30/2023 0.29  0 - 0.9 x10(3)/mcL Final    Baso # 11/30/2023 0.01  <=0.2 x10(3)/mcL Final    IG# 11/30/2023 0.00  0 - 0.04 x10(3)/mcL Final    IG% 11/30/2023 0.0  % Final       Assessment and Plan       ICD-10-CM ICD-9-CM   1. Annual physical exam  Z00.00 V70.0   2. Need for hepatitis C screening test  Z11.59 V73.89   3. Screening for HIV (human immunodeficiency virus)  Z11.4 V73.89   4. Low libido  R68.82 799.81   5. Migraine with aura and without status migrainosus, not intractable  G43.109 346.00   6. Attention deficit hyperactivity disorder (ADHD), predominantly inattentive type  F90.0 314.00   7. Elevated blood pressure reading  R03.0 796.2   8. Breast cancer screening by mammogram  Z12.31 V76.12   9. Cervical cancer screening  Z12.4 V76.2   10. Prediabetes  R73.03 790.29        1. Annual physical exam  Overview:  Annual wellness exam yearly in December    Orders:  -     CBC Auto Differential; Future; Expected date: 12/05/2024  -     Comprehensive Metabolic Panel; Future; Expected date: 12/05/2024  -     Lipid Panel; Future; Expected date: 12/05/2024  -     TSH; Future; Expected date: 12/05/2024  -     Hemoglobin A1C; Future; Expected date: 12/05/2024    2. Need for hepatitis C screening test  Comments:  Nonreactive    3. Screening for HIV  (human immunodeficiency virus)  Comments:  Nonreactive    4. Low libido  Assessment & Plan:  Testosterone normal at 44.34.  Other hormone levels checked, patient currently on birth control, levels are as expected for birth control.  Needs new gyn, her previous gyn retired, would like referral to Dr. Pabon      5. Migraine with aura and without status migrainosus, not intractable  Overview:  Diagnosed in her 20's by Dr Tabby Manrique not covered by insurance but worked  Imitrex did not help   On Zomig 5 mg as needed but has side effects  12/05/2023 - start Nurtec 75 mg ODT    Assessment & Plan:  Patient states she tried the samples of Nurtec and a worked extremely well.  Had complete resolve meant of all migraine symptoms within an hour.  Instructed to discontinue the Zomig and start Nurtec as needed for abortive therapy.  Follow-up 1 year.    Orders:  -     rimegepant (NURTEC) 75 mg odt; Take 1 tablet (75 mg total) by mouth daily as needed for Migraine. Place ODT tablet on the tongue; alternatively the ODT tablet may be placed under the tongue  Dispense: 16 tablet; Refill: 11    6. Attention deficit hyperactivity disorder (ADHD), predominantly inattentive type  Overview:  DX age 22   Adderall since diagnosis   Adderall 20 mg TID  Managed by Dena Rico, PhD psychology     Assessment & Plan:  Managed by psych.  Did discuss with patient that she can see me every 3 months for medication management if she would prefer, patient will think about it and let me know.      7. Elevated blood pressure reading  Assessment & Plan:  Blood pressure normal today, has not had any recent abnormal at home or at work either.  Continue to monitor.      8. Breast cancer screening by mammogram  Assessment & Plan:  Baseline mammogram ordered to be completed at [facility] [place] after patient turns 39 in March.    Orders:  -     Mammo Digital Screening Bilat w/ Harsha; Future; Expected date: 03/25/2024    9. Cervical  cancer screening  -     Ambulatory referral/consult to Gynecology; Future; Expected date: 12/12/2023    10. Prediabetes  Assessment & Plan:  Fasting glucose elevated, hemoglobin A1c noted to be 5.7.  Patient does admit to drinking a full glass of whole milk chocolate milk every morning and evening.  Instructed to cut this out or cut back on this and monitor sugar and carbohydrate intake, will recheck in 1 year.  Patient verbalized understanding.    Orders:  -     Hemoglobin A1C; Future; Expected date: 12/05/2024           Follow up in about 1 year (around 12/5/2024) for Annual.

## 2023-12-05 NOTE — ASSESSMENT & PLAN NOTE
Testosterone normal at 44.34.  Other hormone levels checked, patient currently on birth control, levels are as expected for birth control.  Needs new gyn, her previous gyn retired, would like referral to Dr. Pabon

## 2023-12-05 NOTE — ASSESSMENT & PLAN NOTE
Baseline mammogram ordered to be completed at [facility] [place] after patient turns 39 in March.

## 2023-12-05 NOTE — ASSESSMENT & PLAN NOTE
Blood pressure normal today, has not had any recent abnormal at home or at work either.  Continue to monitor.

## 2023-12-05 NOTE — ASSESSMENT & PLAN NOTE
Fasting glucose elevated, hemoglobin A1c noted to be 5.7.  Patient does admit to drinking a full glass of whole milk chocolate milk every morning and evening.  Instructed to cut this out or cut back on this and monitor sugar and carbohydrate intake, will recheck in 1 year.  Patient verbalized understanding.

## 2023-12-07 ENCOUNTER — PATIENT MESSAGE (OUTPATIENT)
Dept: FAMILY MEDICINE | Facility: CLINIC | Age: 38
End: 2023-12-07
Payer: COMMERCIAL

## 2023-12-07 DIAGNOSIS — R68.82 LOW LIBIDO: Primary | ICD-10-CM

## 2023-12-20 ENCOUNTER — PATIENT MESSAGE (OUTPATIENT)
Dept: FAMILY MEDICINE | Facility: CLINIC | Age: 38
End: 2023-12-20
Payer: COMMERCIAL

## 2024-01-02 ENCOUNTER — PATIENT MESSAGE (OUTPATIENT)
Dept: FAMILY MEDICINE | Facility: CLINIC | Age: 39
End: 2024-01-02
Payer: COMMERCIAL

## 2024-01-02 DIAGNOSIS — F90.0 ATTENTION DEFICIT HYPERACTIVITY DISORDER (ADHD), PREDOMINANTLY INATTENTIVE TYPE: Primary | ICD-10-CM

## 2024-01-02 RX ORDER — DEXTROAMPHETAMINE SACCHARATE, AMPHETAMINE ASPARTATE, DEXTROAMPHETAMINE SULFATE AND AMPHETAMINE SULFATE 5; 5; 5; 5 MG/1; MG/1; MG/1; MG/1
1 TABLET ORAL 3 TIMES DAILY
Qty: 90 TABLET | Refills: 0 | Status: SHIPPED | OUTPATIENT
Start: 2024-01-02 | End: 2024-02-01 | Stop reason: SDUPTHER

## 2024-01-09 ENCOUNTER — PATIENT MESSAGE (OUTPATIENT)
Dept: FAMILY MEDICINE | Facility: CLINIC | Age: 39
End: 2024-01-09
Payer: COMMERCIAL

## 2024-01-09 DIAGNOSIS — N63.0 MASS OF BREAST, UNSPECIFIED LATERALITY: Primary | ICD-10-CM

## 2024-01-09 DIAGNOSIS — Z12.31 BREAST CANCER SCREENING BY MAMMOGRAM: ICD-10-CM

## 2024-02-01 ENCOUNTER — PATIENT MESSAGE (OUTPATIENT)
Dept: FAMILY MEDICINE | Facility: CLINIC | Age: 39
End: 2024-02-01
Payer: COMMERCIAL

## 2024-02-01 DIAGNOSIS — F90.0 ATTENTION DEFICIT HYPERACTIVITY DISORDER (ADHD), PREDOMINANTLY INATTENTIVE TYPE: ICD-10-CM

## 2024-02-01 RX ORDER — DEXTROAMPHETAMINE SACCHARATE, AMPHETAMINE ASPARTATE, DEXTROAMPHETAMINE SULFATE AND AMPHETAMINE SULFATE 5; 5; 5; 5 MG/1; MG/1; MG/1; MG/1
1 TABLET ORAL 3 TIMES DAILY
Qty: 90 TABLET | Refills: 0 | Status: SHIPPED | OUTPATIENT
Start: 2024-02-01 | End: 2024-03-04 | Stop reason: SDUPTHER

## 2024-02-06 ENCOUNTER — OFFICE VISIT (OUTPATIENT)
Dept: FAMILY MEDICINE | Facility: CLINIC | Age: 39
End: 2024-02-06
Payer: COMMERCIAL

## 2024-02-06 VITALS — HEIGHT: 63 IN | BODY MASS INDEX: 21.29 KG/M2

## 2024-02-06 DIAGNOSIS — F41.1 ANXIETY STATE: ICD-10-CM

## 2024-02-06 DIAGNOSIS — B00.1 HERPES LABIALIS: Primary | ICD-10-CM

## 2024-02-06 PROCEDURE — 1160F RVW MEDS BY RX/DR IN RCRD: CPT | Mod: CPTII,95,, | Performed by: NURSE PRACTITIONER

## 2024-02-06 PROCEDURE — 99213 OFFICE O/P EST LOW 20 MIN: CPT | Mod: 95,,, | Performed by: NURSE PRACTITIONER

## 2024-02-06 PROCEDURE — 3008F BODY MASS INDEX DOCD: CPT | Mod: CPTII,95,, | Performed by: NURSE PRACTITIONER

## 2024-02-06 PROCEDURE — 1159F MED LIST DOCD IN RCRD: CPT | Mod: CPTII,95,, | Performed by: NURSE PRACTITIONER

## 2024-02-06 RX ORDER — VALACYCLOVIR HYDROCHLORIDE 1 G/1
2000 TABLET, FILM COATED ORAL EVERY 12 HOURS
Qty: 30 TABLET | Refills: 11 | Status: SHIPPED | OUTPATIENT
Start: 2024-02-06

## 2024-02-06 RX ORDER — ALPRAZOLAM 0.25 MG/1
0.25 TABLET ORAL NIGHTLY PRN
Qty: 30 TABLET | Refills: 2 | Status: SHIPPED | OUTPATIENT
Start: 2024-02-06 | End: 2024-04-11

## 2024-02-06 NOTE — PROGRESS NOTES
TELEMEDICINE VISIT     Patient ID: Joo Haywood is a 38 y.o. female.  MRN: [id number]  : 1985    Subjective:        TELEMEDICINE  The patient location is: home  The chief complaint leading to consultation is: Swollen lymphnode     Visit type: Virtual visit with synchronous audio and video    Total time spent with patient: 15 minutes  20 minutes of total time spent on the encounter, which includes face to face time and non-face to face time preparing to see the patient (eg, review of tests), obtaining and/or reviewing separately obtained history, documenting clinical information in the electronic or other health record, independently interpreting results (not separately reported) and communicating results to the patient/family/caregiver, or care coordination (not separately reported).    Each patient to whom he or she provides medical services by telemedicine is:  (1) informed of the relationship between the physician and patient and the respective role of any other health care provider with respect to management of the patient; and (2) notified that he or she may decline to receive medical services by telemedicine and may withdraw from such care at any time.    HPI: HPI   This is a 38-year-old white female who was seen today via virtual visit to discuss a fever blister, swollen glands, anxiety and not sleeping.  Patient reports history of fever blisters, has Valtrex as needed and takes lysine also.  Has been under a lot of stress lately, just found out she will be losing her long-time job in a few months.  Started with a fever blister  and also swollen glands yesterday.  States glands are much better now that she started the Valtrex and lysine.  Since she is having so much extra stress, she is having trouble sleeping.  Has taken Xanax in the past for short periods of time for sleep and stress.    Health maintenance reviewed with the patient.  Health maintenance completed:  Health Maintenance  Topics with due status: Not Due       Topic Last Completion Date    Hemoglobin A1c (Prediabetes) 11/30/2023      Health maintenance due:  Health Maintenance Due   Topic Date Due    Cervical Cancer Screening  Never done      ROS:  Review of Systems   Constitutional:  Negative for activity change and unexpected weight change.   HENT:  Negative for hearing loss, rhinorrhea and trouble swallowing.    Eyes:  Negative for discharge and visual disturbance.   Respiratory:  Negative for chest tightness and wheezing.    Cardiovascular:  Negative for chest pain and palpitations.   Gastrointestinal:  Negative for blood in stool, constipation, diarrhea and vomiting.   Endocrine: Negative for polydipsia and polyuria.   Genitourinary:  Negative for difficulty urinating, dysuria, hematuria and menstrual problem.   Musculoskeletal:  Negative for arthralgias, joint swelling and neck pain.   Neurological:  Negative for weakness and headaches.   Psychiatric/Behavioral:  Positive for dysphoric mood. Negative for confusion.       Complete ROS negative except as stated in HPI  History:     Past Medical History:   Diagnosis Date    ADHD (attention deficit hyperactivity disorder)     Migraine       Past Surgical History:   Procedure Laterality Date    CERVICAL CONIZATION   W/ LASER  2013    FESS, USING COMPUTER-ASSISTED NAVIGATION, WITH NASAL TURBINATE REDUCTION  2021    Dr. Stevens    FUNCTIONAL ENDOSCOPIC SINUS SURGERY (FESS)       Family History   Problem Relation Age of Onset    Ulcerative colitis Mother     Heart disease Father     Alcohol abuse Paternal Grandmother       Social History     Tobacco Use    Smoking status: Never     Passive exposure: Never    Smokeless tobacco: Never   Substance and Sexual Activity    Alcohol use: Not Currently    Drug use: Never    Sexual activity: Yes     Partners: Male     Birth control/protection: OCP          Allergies: Review of patient's allergies indicates:  No Known Allergies  Objective:  "    Vitals:    02/06/24 1142   Height: 5' 3" (1.6 m)   PainSc:   2         Physical Examination:   Physical Exam  Vitals and nursing note reviewed.   Constitutional:       Appearance: Normal appearance.   HENT:      Head: Normocephalic and atraumatic.   Neurological:      General: No focal deficit present.      Mental Status: She is alert and oriented to person, place, and time.   Psychiatric:         Mood and Affect: Mood normal.         Behavior: Behavior normal.         Thought Content: Thought content normal.         Judgment: Judgment normal.           Medications:     Medication List with Changes/Refills   New Medications    ALPRAZOLAM (XANAX) 0.25 MG TABLET    Take 1 tablet (0.25 mg total) by mouth nightly as needed for Anxiety.    VALACYCLOVIR (VALTREX) 1000 MG TABLET    Take 2 tablets (2,000 mg total) by mouth every 12 (twelve) hours.   Current Medications    DEXTROAMPHETAMINE-AMPHETAMINE (ADDERALL) 20 MG TABLET    Take 1 tablet by mouth 3 (three) times daily.    KEV 24 FE 1 MG-20 MCG (24)/75 MG (4) PER TABLET    Take 1 tablet by mouth.    RIMEGEPANT (NURTEC) 75 MG ODT    Take 1 tablet (75 mg total) by mouth daily as needed for Migraine. Place ODT tablet on the tongue; alternatively the ODT tablet may be placed under the tongue     Assessment and Plan       ICD-10-CM ICD-9-CM   1. Herpes labialis  B00.1 054.9   2. Anxiety state  F41.1 300.00     1. Herpes labialis  Overview:  Lysine daily for prevention  Valtrex as needed for outbreaks    Assessment & Plan:  Refill Valtrex today, continue lysine.  Continue to monitor for complete resolution of swollen gland.    Orders:  -     valACYclovir (VALTREX) 1000 MG tablet; Take 2 tablets (2,000 mg total) by mouth every 12 (twelve) hours.  Dispense: 30 tablet; Refill: 11    2. Anxiety state  Overview:  02/06/2024 - start alprazolam 0.25 mg as needed at bedtime    Assessment & Plan:  Trial of alprazolam 0.25 mg as needed at bedtime for anxiety and sleep.  Follow-up " as scheduled.    Orders:  -     ALPRAZolam (XANAX) 0.25 MG tablet; Take 1 tablet (0.25 mg total) by mouth nightly as needed for Anxiety.  Dispense: 30 tablet; Refill: 2              Follow Up:       I spent greater than 20 minutes today both in chart review and greater than 50% of that time in discussion with the patient regarding health maintenance, diagnoses, diagnostic tests, medications, treatments, symptom management, expected results and adverse effects. Patient verbalized understanding and all questions were answered.

## 2024-02-06 NOTE — ASSESSMENT & PLAN NOTE
Refill Valtrex today, continue lysine.  Continue to monitor for complete resolution of swollen gland.

## 2024-03-04 DIAGNOSIS — F90.0 ATTENTION DEFICIT HYPERACTIVITY DISORDER (ADHD), PREDOMINANTLY INATTENTIVE TYPE: ICD-10-CM

## 2024-03-04 RX ORDER — DEXTROAMPHETAMINE SACCHARATE, AMPHETAMINE ASPARTATE, DEXTROAMPHETAMINE SULFATE AND AMPHETAMINE SULFATE 5; 5; 5; 5 MG/1; MG/1; MG/1; MG/1
1 TABLET ORAL 3 TIMES DAILY
Qty: 90 TABLET | Refills: 0 | Status: SHIPPED | OUTPATIENT
Start: 2024-03-04 | End: 2024-03-29 | Stop reason: SDUPTHER

## 2024-03-11 PROBLEM — Z00.00 ANNUAL PHYSICAL EXAM: Status: RESOLVED | Noted: 2023-12-05 | Resolved: 2024-03-11

## 2024-03-25 ENCOUNTER — PATIENT MESSAGE (OUTPATIENT)
Dept: FAMILY MEDICINE | Facility: CLINIC | Age: 39
End: 2024-03-25

## 2024-03-25 ENCOUNTER — OFFICE VISIT (OUTPATIENT)
Dept: FAMILY MEDICINE | Facility: CLINIC | Age: 39
End: 2024-03-25
Payer: COMMERCIAL

## 2024-03-25 VITALS — WEIGHT: 115 LBS | BODY MASS INDEX: 20.38 KG/M2 | HEIGHT: 63 IN

## 2024-03-25 DIAGNOSIS — F90.0 ATTENTION DEFICIT HYPERACTIVITY DISORDER (ADHD), PREDOMINANTLY INATTENTIVE TYPE: ICD-10-CM

## 2024-03-25 DIAGNOSIS — Z12.31 BREAST CANCER SCREENING BY MAMMOGRAM: ICD-10-CM

## 2024-03-25 DIAGNOSIS — F32.A DEPRESSION, UNSPECIFIED DEPRESSION TYPE: Primary | ICD-10-CM

## 2024-03-25 DIAGNOSIS — F41.1 ANXIETY STATE: ICD-10-CM

## 2024-03-25 PROCEDURE — 99213 OFFICE O/P EST LOW 20 MIN: CPT | Mod: 95,,, | Performed by: NURSE PRACTITIONER

## 2024-03-25 PROCEDURE — 1160F RVW MEDS BY RX/DR IN RCRD: CPT | Mod: CPTII,95,, | Performed by: NURSE PRACTITIONER

## 2024-03-25 PROCEDURE — 3008F BODY MASS INDEX DOCD: CPT | Mod: CPTII,95,, | Performed by: NURSE PRACTITIONER

## 2024-03-25 PROCEDURE — 1159F MED LIST DOCD IN RCRD: CPT | Mod: CPTII,95,, | Performed by: NURSE PRACTITIONER

## 2024-03-25 RX ORDER — ETONOGESTREL AND ETHINYL ESTRADIOL VAGINAL RING .015; .12 MG/D; MG/D
RING VAGINAL
COMMUNITY
Start: 2024-02-19

## 2024-03-25 RX ORDER — VILAZODONE HYDROCHLORIDE 10 MG/1
10 TABLET ORAL DAILY
Qty: 30 TABLET | Refills: 11 | Status: SHIPPED | OUTPATIENT
Start: 2024-03-25 | End: 2025-03-25

## 2024-03-25 NOTE — ASSESSMENT & PLAN NOTE
Was unable to get the Trintellix because her insurance did not cover it.  Never started the Zoloft because she was worried about sexual side effects.  Trial of Viibryd 10 mg daily, follow-up 3 months with virtual visit.

## 2024-03-25 NOTE — PROGRESS NOTES
TELEMEDICINE VISIT     Patient ID: Joo Haywood is a 39 y.o. female.  MRN: [id number]  : 1985    Subjective:        TELEMEDICINE  The patient location is: home  The chief complaint leading to consultation is: ADHD     Visit type: Virtual visit with synchronous audio and video    Total time spent with patient: 15 minutes  20 minutes of total time spent on the encounter, which includes face to face time and non-face to face time preparing to see the patient (eg, review of tests), obtaining and/or reviewing separately obtained history, documenting clinical information in the electronic or other health record, independently interpreting results (not separately reported) and communicating results to the patient/family/caregiver, or care coordination (not separately reported).    Each patient to whom he or she provides medical services by telemedicine is:  (1) informed of the relationship between the physician and patient and the respective role of any other health care provider with respect to management of the patient; and (2) notified that he or she may decline to receive medical services by telemedicine and may withdraw from such care at any time.    HPI: HPI   This is a 39-year-old white female who is seen today via virtual visit for three-month follow-up for ADHD, anxiety, depression.  Reports overall doing well.    Health maintenance reviewed with the patient.  Health maintenance completed:  Health Maintenance Topics with due status: Not Due       Topic Last Completion Date    Cervical Cancer Screening 2023    Hemoglobin A1c (Prediabetes) 2023      Health maintenance due:  Health Maintenance Due   Topic Date Due    Mammogram  Never done      ROS:  Review of Systems   Constitutional:  Negative for activity change and unexpected weight change.   HENT:  Negative for hearing loss, rhinorrhea and trouble swallowing.    Eyes:  Negative for discharge and visual disturbance.   Respiratory:   "Negative for chest tightness and wheezing.    Cardiovascular:  Negative for chest pain and palpitations.   Gastrointestinal:  Negative for blood in stool, constipation, diarrhea and vomiting.   Endocrine: Negative for polydipsia and polyuria.   Genitourinary:  Negative for difficulty urinating, dysuria, hematuria and menstrual problem.   Musculoskeletal:  Negative for arthralgias, joint swelling and neck pain.   Neurological:  Negative for weakness and headaches.   Psychiatric/Behavioral:  Negative for confusion and dysphoric mood.       Complete ROS negative except as stated in HPI  History:     Past Medical History:   Diagnosis Date    ADHD (attention deficit hyperactivity disorder)     Migraine       Past Surgical History:   Procedure Laterality Date    CERVICAL CONIZATION   W/ LASER  2013    FESS, USING COMPUTER-ASSISTED NAVIGATION, WITH NASAL TURBINATE REDUCTION  2021    Dr. Stevens    FUNCTIONAL ENDOSCOPIC SINUS SURGERY (FESS)       Family History   Problem Relation Age of Onset    Ulcerative colitis Mother     Heart disease Father     Alcohol abuse Paternal Grandmother       Social History     Tobacco Use    Smoking status: Never     Passive exposure: Never    Smokeless tobacco: Never   Substance and Sexual Activity    Alcohol use: Not Currently    Drug use: Never    Sexual activity: Yes     Partners: Male     Birth control/protection: OCP          Allergies: Review of patient's allergies indicates:  No Known Allergies  Objective:     Vitals:    03/25/24 1540   Weight: 52.2 kg (115 lb)   Height: 5' 3" (1.6 m)         Physical Examination:   Physical Exam  Vitals and nursing note reviewed.   Constitutional:       Appearance: Normal appearance.   HENT:      Head: Normocephalic and atraumatic.   Neurological:      General: No focal deficit present.      Mental Status: She is alert and oriented to person, place, and time.   Psychiatric:         Mood and Affect: Mood normal.         Behavior: Behavior normal.       "   Thought Content: Thought content normal.         Judgment: Judgment normal.           Medications:     Medication List with Changes/Refills   New Medications    VILAZODONE (VIIBRYD) 10 MG TAB TABLET    Take 1 tablet (10 mg total) by mouth once daily.   Current Medications    ALPRAZOLAM (XANAX) 0.25 MG TABLET    Take 1 tablet (0.25 mg total) by mouth nightly as needed for Anxiety.    DEXTROAMPHETAMINE-AMPHETAMINE (ADDERALL) 20 MG TABLET    Take 1 tablet by mouth 3 (three) times daily.    ETONOGESTREL-ETHINYL ESTRADIOL (NUVARING) 0.12-0.015 MG/24 HR VAGINAL RING    Insert vaginally and leave in place for 3 consecutive weeks, then remove for 1 week.    RIMEGEPANT (NURTEC) 75 MG ODT    Take 1 tablet (75 mg total) by mouth daily as needed for Migraine. Place ODT tablet on the tongue; alternatively the ODT tablet may be placed under the tongue    VALACYCLOVIR (VALTREX) 1000 MG TABLET    Take 2 tablets (2,000 mg total) by mouth every 12 (twelve) hours.   Discontinued Medications    KEV 24 FE 1 MG-20 MCG (24)/75 MG (4) PER TABLET    Take 1 tablet by mouth.     Assessment and Plan       ICD-10-CM ICD-9-CM   1. Depression, unspecified depression type  F32.A 311   2. Attention deficit hyperactivity disorder (ADHD), predominantly inattentive type  F90.0 314.00   3. Breast cancer screening by mammogram  Z12.31 V76.12   4. Anxiety state  F41.1 300.00     1. Depression, unspecified depression type  Overview:  03/25/2024 - Viibryd 10 mg daily    Assessment & Plan:  Was unable to get the Trintellix because her insurance did not cover it.  Never started the Zoloft because she was worried about sexual side effects.  Trial of Viibryd 10 mg daily, follow-up 3 months with virtual visit.    Orders:  -     vilazodone (VIIBRYD) 10 mg Tab tablet; Take 1 tablet (10 mg total) by mouth once daily.  Dispense: 30 tablet; Refill: 11    2. Attention deficit hyperactivity disorder (ADHD), predominantly inattentive type  Overview:  DX age 22    Adderall since diagnosis   Adderall 20 mg TID  Previously managed by Dena Rico, PhD psychology     Assessment & Plan:  Stable, continue Adderall 20 mg 3 times daily, follow-up in 3 months with virtual visit.      3. Breast cancer screening by mammogram  Assessment & Plan:  Mammogram ordered to be completed at breast Center [facility].    Orders:  -     Mammo Digital Screening Bilat w/ Harsha; Future; Expected date: 03/25/2024    4. Anxiety state  Overview:  02/06/2024 - start alprazolam 0.25 mg as needed at bedtime    Assessment & Plan:  Stable, continue Xanax as needed at bedtime, follow-up 3 months with virtual visit.                Follow Up:   Follow up in about 3 months (around 6/25/2024) for Virtual Visit.    I spent greater than 20 minutes today both in chart review and greater than 50% of that time in discussion with the patient regarding health maintenance, diagnoses, diagnostic tests, medications, treatments, symptom management, expected results and adverse effects. Patient verbalized understanding and all questions were answered.

## 2024-03-29 DIAGNOSIS — F90.0 ATTENTION DEFICIT HYPERACTIVITY DISORDER (ADHD), PREDOMINANTLY INATTENTIVE TYPE: ICD-10-CM

## 2024-04-01 RX ORDER — DEXTROAMPHETAMINE SACCHARATE, AMPHETAMINE ASPARTATE, DEXTROAMPHETAMINE SULFATE AND AMPHETAMINE SULFATE 5; 5; 5; 5 MG/1; MG/1; MG/1; MG/1
1 TABLET ORAL 3 TIMES DAILY
Qty: 90 TABLET | Refills: 0 | Status: SHIPPED | OUTPATIENT
Start: 2024-04-01 | End: 2024-04-30 | Stop reason: SDUPTHER

## 2024-04-03 ENCOUNTER — PATIENT MESSAGE (OUTPATIENT)
Dept: FAMILY MEDICINE | Facility: CLINIC | Age: 39
End: 2024-04-03
Payer: COMMERCIAL

## 2024-04-11 DIAGNOSIS — F41.1 ANXIETY STATE: ICD-10-CM

## 2024-04-11 RX ORDER — ALPRAZOLAM 0.5 MG/1
0.5 TABLET ORAL NIGHTLY PRN
Qty: 30 TABLET | Refills: 2 | Status: SHIPPED | OUTPATIENT
Start: 2024-04-11

## 2024-04-11 RX ORDER — ALPRAZOLAM 0.25 MG/1
0.25 TABLET ORAL NIGHTLY PRN
Qty: 30 TABLET | Refills: 2 | OUTPATIENT
Start: 2024-04-11

## 2024-04-30 DIAGNOSIS — F90.0 ATTENTION DEFICIT HYPERACTIVITY DISORDER (ADHD), PREDOMINANTLY INATTENTIVE TYPE: ICD-10-CM

## 2024-04-30 RX ORDER — DEXTROAMPHETAMINE SACCHARATE, AMPHETAMINE ASPARTATE, DEXTROAMPHETAMINE SULFATE AND AMPHETAMINE SULFATE 5; 5; 5; 5 MG/1; MG/1; MG/1; MG/1
1 TABLET ORAL 3 TIMES DAILY
Qty: 90 TABLET | Refills: 0 | Status: SHIPPED | OUTPATIENT
Start: 2024-04-30 | End: 2024-05-28 | Stop reason: SDUPTHER

## 2024-05-02 ENCOUNTER — TELEPHONE (OUTPATIENT)
Dept: FAMILY MEDICINE | Facility: CLINIC | Age: 39
End: 2024-05-02
Payer: COMMERCIAL

## 2024-05-02 ENCOUNTER — PATIENT MESSAGE (OUTPATIENT)
Dept: FAMILY MEDICINE | Facility: CLINIC | Age: 39
End: 2024-05-02
Payer: COMMERCIAL

## 2024-05-02 NOTE — TELEPHONE ENCOUNTER
PA STARTED FOR [facility].  FAX RECEIVED FROM PHARMACY STATING PA NEEDED

## 2024-05-02 NOTE — TELEPHONE ENCOUNTER
Call from [facility] RX PA department informing that the insurance will only cover Nurtec 8 tablets per month.  The prescription was sent in for 16.  Per provider it is okay to fill 8 tablets monthly instead of 16. Message given to [facility].

## 2024-05-28 DIAGNOSIS — F90.0 ATTENTION DEFICIT HYPERACTIVITY DISORDER (ADHD), PREDOMINANTLY INATTENTIVE TYPE: ICD-10-CM

## 2024-05-28 RX ORDER — DEXTROAMPHETAMINE SACCHARATE, AMPHETAMINE ASPARTATE, DEXTROAMPHETAMINE SULFATE AND AMPHETAMINE SULFATE 5; 5; 5; 5 MG/1; MG/1; MG/1; MG/1
1 TABLET ORAL 3 TIMES DAILY
Qty: 90 TABLET | Refills: 0 | Status: SHIPPED | OUTPATIENT
Start: 2024-05-28

## 2024-06-13 LAB
BCS RECOMMENDATION EXT: NORMAL
BCS RECOMMENDATION EXT: NORMAL

## 2024-06-14 ENCOUNTER — DOCUMENTATION ONLY (OUTPATIENT)
Dept: FAMILY MEDICINE | Facility: CLINIC | Age: 39
End: 2024-06-14
Payer: COMMERCIAL

## 2024-06-17 DIAGNOSIS — R92.343 EXTREMELY DENSE TISSUE OF BOTH BREASTS ON MAMMOGRAPHY: Primary | ICD-10-CM

## 2024-06-17 NOTE — PROGRESS NOTES
Mammogram does not show any signs of malignancy but she does have category D extremely dense breasts which lowers the sensitivity of mammography.  I would like to order bilateral breast ultrasound to supplement this screening.  Please send the order to breast [facility] and notify patient to schedule.

## 2024-06-24 ENCOUNTER — OFFICE VISIT (OUTPATIENT)
Dept: FAMILY MEDICINE | Facility: CLINIC | Age: 39
End: 2024-06-24
Payer: COMMERCIAL

## 2024-06-24 ENCOUNTER — TELEPHONE (OUTPATIENT)
Dept: FAMILY MEDICINE | Facility: CLINIC | Age: 39
End: 2024-06-24

## 2024-06-24 ENCOUNTER — PATIENT MESSAGE (OUTPATIENT)
Dept: FAMILY MEDICINE | Facility: CLINIC | Age: 39
End: 2024-06-24

## 2024-06-24 VITALS — SYSTOLIC BLOOD PRESSURE: 111 MMHG | DIASTOLIC BLOOD PRESSURE: 75 MMHG

## 2024-06-24 VITALS — WEIGHT: 118 LBS | BODY MASS INDEX: 20.91 KG/M2 | HEIGHT: 63 IN

## 2024-06-24 DIAGNOSIS — Z12.31 BREAST CANCER SCREENING BY MAMMOGRAM: ICD-10-CM

## 2024-06-24 DIAGNOSIS — M25.511 ACUTE PAIN OF RIGHT SHOULDER: ICD-10-CM

## 2024-06-24 DIAGNOSIS — N63.12 MASS OF UPPER INNER QUADRANT OF RIGHT BREAST: ICD-10-CM

## 2024-06-24 DIAGNOSIS — R11.0 NAUSEA: ICD-10-CM

## 2024-06-24 DIAGNOSIS — F90.0 ATTENTION DEFICIT HYPERACTIVITY DISORDER (ADHD), PREDOMINANTLY INATTENTIVE TYPE: Primary | ICD-10-CM

## 2024-06-24 DIAGNOSIS — R92.343 EXTREMELY DENSE TISSUE OF BOTH BREASTS ON MAMMOGRAPHY: ICD-10-CM

## 2024-06-24 DIAGNOSIS — F41.1 ANXIETY STATE: ICD-10-CM

## 2024-06-24 DIAGNOSIS — F32.A DEPRESSION, UNSPECIFIED DEPRESSION TYPE: ICD-10-CM

## 2024-06-24 DIAGNOSIS — G43.109 MIGRAINE WITH AURA AND WITHOUT STATUS MIGRAINOSUS, NOT INTRACTABLE: ICD-10-CM

## 2024-06-24 PROCEDURE — 1160F RVW MEDS BY RX/DR IN RCRD: CPT | Mod: CPTII,95,, | Performed by: NURSE PRACTITIONER

## 2024-06-24 PROCEDURE — 3008F BODY MASS INDEX DOCD: CPT | Mod: CPTII,95,, | Performed by: NURSE PRACTITIONER

## 2024-06-24 PROCEDURE — 99214 OFFICE O/P EST MOD 30 MIN: CPT | Mod: 95,,, | Performed by: NURSE PRACTITIONER

## 2024-06-24 PROCEDURE — 1159F MED LIST DOCD IN RCRD: CPT | Mod: CPTII,95,, | Performed by: NURSE PRACTITIONER

## 2024-06-24 RX ORDER — ONDANSETRON 8 MG/1
8 TABLET, ORALLY DISINTEGRATING ORAL EVERY 6 HOURS PRN
Qty: 30 TABLET | Refills: 11 | Status: SHIPPED | OUTPATIENT
Start: 2024-06-24

## 2024-06-24 RX ORDER — METHYLPREDNISOLONE 4 MG/1
TABLET ORAL
Qty: 21 EACH | Refills: 0 | Status: SHIPPED | OUTPATIENT
Start: 2024-06-24 | End: 2024-07-15

## 2024-06-24 NOTE — TELEPHONE ENCOUNTER
----- Message from Lizeth Rico sent at 6/24/2024 10:32 AM CDT -----  Who Called: Joo Haywood    Caller is requesting assistance/information from provider's office.    Preferred Method of Contact: Phone Call  Patient's Preferred Phone Number on File: [phone]   Best Call Back Number, if different:  Additional Information:medial advice,   [facility] [phone] or [phone] called to confirm getting corrected order for US, please advise, thanks

## 2024-06-24 NOTE — ASSESSMENT & PLAN NOTE
Recent mammogram read as normal but category D extremely dense breast tissue, bilateral ultrasound ordered.

## 2024-06-24 NOTE — ASSESSMENT & PLAN NOTE
Recent mammogram normal but category D extremely dense breast tissue.  Reports palpable mass right upper inner quadrant.  Diagnostic ultrasound ordered.

## 2024-06-24 NOTE — PROGRESS NOTES
TELEMEDICINE VISIT     Patient ID: Joo Haywood is a 39 y.o. female.  MRN: [id number]  : 1985    Subjective:        TELEMEDICINE  The patient location is: home  The chief complaint leading to consultation is: ADHD (3m fu), Anxiety (3m fu), Depression (3m fu), and Migraine (3m fu)     Visit type: Virtual visit with synchronous audio and video    Total time spent with patient: 20 minutes  30 minutes of total time spent on the encounter, which includes face to face time and non-face to face time preparing to see the patient (eg, review of tests), obtaining and/or reviewing separately obtained history, documenting clinical information in the electronic or other health record, independently interpreting results (not separately reported) and communicating results to the patient/family/caregiver, or care coordination (not separately reported).    Each patient to whom he or she provides medical services by telemedicine is:  (1) informed of the relationship between the physician and patient and the respective role of any other health care provider with respect to management of the patient; and (2) notified that he or she may decline to receive medical services by telemedicine and may withdraw from such care at any time.    HPI: HPI   This is a 39-year-old white female who was seen today via virtual visit for three-month follow-up for ADHD, depression, anxiety, migraines.  Reports recently had her mammogram which showed extremely dense breast tissue, also feels a lump in the inner upper part of her right breast.  Also complains of right shoulder pain.  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  Also, likes to keep a prescription of Zofran around for intermittent nausea.  Needs a refill.    Health maintenance reviewed with the patient.  Health maintenance completed:  Health Maintenance Topics with due status: Not Due       Topic  Last Completion Date    Cervical Cancer Screening 06/27/2023    Hemoglobin A1c (Prediabetes) 11/30/2023    Mammogram 06/13/2024    Influenza Vaccine Not Due      Health maintenance due:  There are no preventive care reminders to display for this patient.   ROS:  Review of Systems   Constitutional:  Negative for activity change and unexpected weight change.   HENT:  Negative for hearing loss, rhinorrhea and trouble swallowing.    Eyes:  Negative for discharge and visual disturbance.   Respiratory:  Negative for chest tightness and wheezing.    Cardiovascular:  Negative for chest pain and palpitations.   Gastrointestinal:  Negative for blood in stool, constipation, diarrhea and vomiting.   Endocrine: Negative for polydipsia and polyuria.   Genitourinary:  Negative for difficulty urinating, dysuria, hematuria and menstrual problem.   Musculoskeletal:  Positive for arthralgias. Negative for joint swelling and neck pain.   Neurological:  Negative for weakness and headaches.   Psychiatric/Behavioral:  Negative for confusion and dysphoric mood.    All other systems reviewed and are negative.     Complete ROS negative except as stated in HPI  History:     Past Medical History:   Diagnosis Date    ADHD (attention deficit hyperactivity disorder)     Migraine       Past Surgical History:   Procedure Laterality Date    CERVICAL CONIZATION   W/ LASER  2013    FESS, USING COMPUTER-ASSISTED NAVIGATION, WITH NASAL TURBINATE REDUCTION  2021    Dr. Clemente    FUNCTIONAL ENDOSCOPIC SINUS SURGERY (FESS)       Family History   Problem Relation Name Age of Onset    Ulcerative colitis Mother      Heart disease Father Anthony Clemente     Alcohol abuse Paternal Grandmother Briana clemente       Social History     Tobacco Use    Smoking status: Never     Passive exposure: Never    Smokeless tobacco: Never   Substance and Sexual Activity    Alcohol use: Not Currently    Drug use: Never    Sexual activity: Yes     Partners: Male     Birth  "control/protection: OCP          Allergies: Review of patient's allergies indicates:  No Known Allergies  Objective:     Vitals:    06/24/24 1533   Weight: 53.5 kg (118 lb)   Height: 5' 3" (1.6 m)         Physical Examination:   Physical Exam  Vitals and nursing note reviewed.   Constitutional:       Appearance: Normal appearance.   HENT:      Head: Normocephalic and atraumatic.   Neurological:      General: No focal deficit present.      Mental Status: She is alert and oriented to person, place, and time.   Psychiatric:         Mood and Affect: Mood normal.         Behavior: Behavior normal.         Thought Content: Thought content normal.         Judgment: Judgment normal.           Medications:     Medication List with Changes/Refills   New Medications    METHYLPREDNISOLONE (MEDROL DOSEPACK) 4 MG TABLET    use as directed    ONDANSETRON (ZOFRAN-ODT) 8 MG TBDL    Take 1 tablet (8 mg total) by mouth every 6 (six) hours as needed.   Current Medications    ALPRAZOLAM (XANAX) 0.5 MG TABLET    Take 1 tablet (0.5 mg total) by mouth nightly as needed for Anxiety.    DEXTROAMPHETAMINE-AMPHETAMINE (ADDERALL) 20 MG TABLET    Take 1 tablet by mouth 3 (three) times daily.    ETONOGESTREL-ETHINYL ESTRADIOL (NUVARING) 0.12-0.015 MG/24 HR VAGINAL RING    Insert vaginally and leave in place for 3 consecutive weeks, then remove for 1 week.    RIMEGEPANT (NURTEC) 75 MG ODT    Take 1 tablet (75 mg total) by mouth daily as needed for Migraine. Place ODT tablet on the tongue; alternatively the ODT tablet may be placed under the tongue    VALACYCLOVIR (VALTREX) 1000 MG TABLET    Take 2 tablets (2,000 mg total) by mouth every 12 (twelve) hours.   Discontinued Medications    VILAZODONE (VIIBRYD) 10 MG TAB TABLET    Take 1 tablet (10 mg total) by mouth once daily.     Assessment and Plan       ICD-10-CM ICD-9-CM   1. Attention deficit hyperactivity disorder (ADHD), predominantly inattentive type  F90.0 314.00   2. Extremely dense tissue " of both breasts on mammography  R92.343 793.82   3. Breast cancer screening by mammogram  Z12.31 V76.12   4. Depression, unspecified depression type  F32.A 311   5. Anxiety state  F41.1 300.00   6. Migraine with aura and without status migrainosus, not intractable  G43.109 346.00   7. Mass of upper inner quadrant of right breast  N63.12 611.72   8. Acute pain of right shoulder  M25.511 719.41   9. Nausea  R11.0 787.02     1. Attention deficit hyperactivity disorder (ADHD), predominantly inattentive type  Overview:  DX age 22   Adderall since diagnosis   Adderall 20 mg TID  Previously managed by Dena Rico, PhD psychology     Assessment & Plan:  Stable, continue Adderall, follow-up 3 months with virtual visit.      2. Extremely dense tissue of both breasts on mammography  Assessment & Plan:  Ultrasound ordered to be completed at breast Center [facility].      3. Breast cancer screening by mammogram  Assessment & Plan:  Recent mammogram read as normal but category D extremely dense breast tissue, bilateral ultrasound ordered.      4. Depression, unspecified depression type  Overview:  03/25/2024 - Viibryd 10 mg daily (never started)    Assessment & Plan:  Never started the Viibryd, feels like she is doing okay without it.      5. Anxiety state  Overview:  02/06/2024 - start alprazolam 0.25 mg as needed at bedtime    04/11/2024 - alprazolam 0.5 mg at bedtime     Assessment & Plan:  Stable, continue Xanax as needed, follow-up 3 months with virtual visit.      6. Migraine with aura and without status migrainosus, not intractable  Overview:  Diagnosed in her 20's by Dr Tabby Manrique not covered by insurance but worked  Imitrex did not help   On Zomig 5 mg as needed but has side effects  12/05/2023 - start Nurtec 75 mg ODT    Assessment & Plan:  Stable, continue Nurtec as needed, follow-up 3 months with virtual visit.      7. Mass of upper inner quadrant of right breast  Assessment & Plan:  Recent mammogram normal but  category D extremely dense breast tissue.  Reports palpable mass right upper inner quadrant.  Diagnostic ultrasound ordered.    Orders:  -     US Breast Bilateral Limited; Future; Expected date: 06/24/2024    8. Acute pain of right shoulder  Comments:  Medrol Dosepak as directed, follow-up for any worsening  Orders:  -     methylPREDNISolone (MEDROL DOSEPACK) 4 mg tablet; use as directed  Dispense: 21 each; Refill: 0    9. Nausea  Comments:  Zofran as needed  Orders:  -     ondansetron (ZOFRAN-ODT) 8 MG TbDL; Take 1 tablet (8 mg total) by mouth every 6 (six) hours as needed.  Dispense: 30 tablet; Refill: 11              Follow Up:   Follow up in about 3 months (around 9/24/2024) for Virtual Visit.    I spent greater than 30 minutes today both in chart review and greater than 50% of that time in discussion with the patient regarding health maintenance, diagnoses, diagnostic tests, medications, treatments, symptom management, expected results and adverse effects. Patient verbalized understanding and all questions were answered.

## 2024-06-26 DIAGNOSIS — F90.0 ATTENTION DEFICIT HYPERACTIVITY DISORDER (ADHD), PREDOMINANTLY INATTENTIVE TYPE: ICD-10-CM

## 2024-06-27 ENCOUNTER — DOCUMENTATION ONLY (OUTPATIENT)
Dept: FAMILY MEDICINE | Facility: CLINIC | Age: 39
End: 2024-06-27
Payer: COMMERCIAL

## 2024-06-27 RX ORDER — DEXTROAMPHETAMINE SACCHARATE, AMPHETAMINE ASPARTATE, DEXTROAMPHETAMINE SULFATE AND AMPHETAMINE SULFATE 5; 5; 5; 5 MG/1; MG/1; MG/1; MG/1
1 TABLET ORAL 3 TIMES DAILY
Qty: 90 TABLET | Refills: 0 | Status: SHIPPED | OUTPATIENT
Start: 2024-06-27

## 2024-07-05 ENCOUNTER — PATIENT MESSAGE (OUTPATIENT)
Dept: FAMILY MEDICINE | Facility: CLINIC | Age: 39
End: 2024-07-05
Payer: COMMERCIAL

## 2024-07-16 DIAGNOSIS — F41.1 ANXIETY STATE: ICD-10-CM

## 2024-07-16 RX ORDER — ALPRAZOLAM 0.5 MG/1
0.5 TABLET ORAL NIGHTLY PRN
Qty: 30 TABLET | Refills: 2 | Status: SHIPPED | OUTPATIENT
Start: 2024-07-16

## 2024-07-24 DIAGNOSIS — F90.0 ATTENTION DEFICIT HYPERACTIVITY DISORDER (ADHD), PREDOMINANTLY INATTENTIVE TYPE: ICD-10-CM

## 2024-07-24 RX ORDER — DEXTROAMPHETAMINE SACCHARATE, AMPHETAMINE ASPARTATE, DEXTROAMPHETAMINE SULFATE AND AMPHETAMINE SULFATE 5; 5; 5; 5 MG/1; MG/1; MG/1; MG/1
1 TABLET ORAL 3 TIMES DAILY
Qty: 90 TABLET | Refills: 0 | Status: SHIPPED | OUTPATIENT
Start: 2024-07-24

## 2024-07-29 ENCOUNTER — TELEPHONE (OUTPATIENT)
Dept: FAMILY MEDICINE | Facility: CLINIC | Age: 39
End: 2024-07-29
Payer: COMMERCIAL

## 2024-07-29 NOTE — TELEPHONE ENCOUNTER
Are there any outstanding tasks in patient's chart?    Labs for future visit  2. Do we have outstanding/pending referrals?  n    3. Has the patient been seen in an ER, Urgent Care, or admitted since last visit?  Urgent Care- UTI    4. Has patient seen any other health care providers since last visit?  [facility]    5.  Has patient had any blood work or x-rays done since last visit?   Mammogram, Breast US

## 2024-08-05 ENCOUNTER — OFFICE VISIT (OUTPATIENT)
Dept: FAMILY MEDICINE | Facility: CLINIC | Age: 39
End: 2024-08-05
Payer: COMMERCIAL

## 2024-08-05 VITALS — HEIGHT: 63 IN | WEIGHT: 118 LBS | BODY MASS INDEX: 20.91 KG/M2

## 2024-08-05 DIAGNOSIS — L70.9 ACNE, UNSPECIFIED ACNE TYPE: ICD-10-CM

## 2024-08-05 DIAGNOSIS — C50.911 INFILTRATING DUCTAL CARCINOMA OF RIGHT BREAST: Primary | ICD-10-CM

## 2024-08-05 DIAGNOSIS — F41.1 ANXIETY STATE: ICD-10-CM

## 2024-08-05 DIAGNOSIS — N92.0 MENORRHAGIA WITH REGULAR CYCLE: ICD-10-CM

## 2024-08-05 DIAGNOSIS — F32.A DEPRESSION, UNSPECIFIED DEPRESSION TYPE: ICD-10-CM

## 2024-08-05 PROCEDURE — 1159F MED LIST DOCD IN RCRD: CPT | Mod: CPTII,95,, | Performed by: NURSE PRACTITIONER

## 2024-08-05 PROCEDURE — 1160F RVW MEDS BY RX/DR IN RCRD: CPT | Mod: CPTII,95,, | Performed by: NURSE PRACTITIONER

## 2024-08-05 PROCEDURE — 99214 OFFICE O/P EST MOD 30 MIN: CPT | Mod: 95,,, | Performed by: NURSE PRACTITIONER

## 2024-08-05 PROCEDURE — 3008F BODY MASS INDEX DOCD: CPT | Mod: CPTII,95,, | Performed by: NURSE PRACTITIONER

## 2024-08-05 RX ORDER — ALPRAZOLAM 0.5 MG/1
0.5 TABLET ORAL 2 TIMES DAILY PRN
Qty: 60 TABLET | Refills: 2 | Status: SHIPPED | OUTPATIENT
Start: 2024-08-05

## 2024-08-16 DIAGNOSIS — C50.912 ER+ PR+ CARCINOMA OF BREAST, LEFT: ICD-10-CM

## 2024-08-16 DIAGNOSIS — Z17.0 ER+ PR+ CARCINOMA OF BREAST, LEFT: ICD-10-CM

## 2024-08-16 DIAGNOSIS — C50.911 BREAST CANCER, RIGHT BREAST: Primary | ICD-10-CM

## 2024-08-21 ENCOUNTER — PATIENT MESSAGE (OUTPATIENT)
Dept: FAMILY MEDICINE | Facility: CLINIC | Age: 39
End: 2024-08-21
Payer: COMMERCIAL

## 2024-08-21 DIAGNOSIS — F90.0 ATTENTION DEFICIT HYPERACTIVITY DISORDER (ADHD), PREDOMINANTLY INATTENTIVE TYPE: ICD-10-CM

## 2024-08-21 RX ORDER — DEXTROAMPHETAMINE SACCHARATE, AMPHETAMINE ASPARTATE, DEXTROAMPHETAMINE SULFATE AND AMPHETAMINE SULFATE 5; 5; 5; 5 MG/1; MG/1; MG/1; MG/1
1 TABLET ORAL 3 TIMES DAILY
Qty: 90 TABLET | Refills: 0 | Status: SHIPPED | OUTPATIENT
Start: 2024-08-21

## 2024-09-03 ENCOUNTER — TELEPHONE (OUTPATIENT)
Dept: HEMATOLOGY/ONCOLOGY | Facility: CLINIC | Age: 39
End: 2024-09-03
Payer: COMMERCIAL

## 2024-09-03 NOTE — TELEPHONE ENCOUNTER
Spoke with patient regarding new patient appointment 9/4/24 with Dr. Barton. Patient denied concerns or questions at this time. Confirmed appointment date, time and location with patient.

## 2024-09-04 ENCOUNTER — OFFICE VISIT (OUTPATIENT)
Dept: HEMATOLOGY/ONCOLOGY | Facility: CLINIC | Age: 39
End: 2024-09-04
Payer: COMMERCIAL

## 2024-09-04 VITALS
SYSTOLIC BLOOD PRESSURE: 116 MMHG | BODY MASS INDEX: 20.21 KG/M2 | HEART RATE: 86 BPM | OXYGEN SATURATION: 100 % | WEIGHT: 114.06 LBS | RESPIRATION RATE: 18 BRPM | HEIGHT: 63 IN | DIASTOLIC BLOOD PRESSURE: 77 MMHG

## 2024-09-04 DIAGNOSIS — C50.111 MALIGNANT NEOPLASM OF CENTRAL PORTION OF RIGHT BREAST IN FEMALE, ESTROGEN RECEPTOR POSITIVE: Primary | ICD-10-CM

## 2024-09-04 DIAGNOSIS — Z17.0 ER+ PR+ CARCINOMA OF BREAST, LEFT: ICD-10-CM

## 2024-09-04 DIAGNOSIS — C50.912 ER+ PR+ CARCINOMA OF BREAST, LEFT: ICD-10-CM

## 2024-09-04 DIAGNOSIS — Z17.0 MALIGNANT NEOPLASM OF CENTRAL PORTION OF RIGHT BREAST IN FEMALE, ESTROGEN RECEPTOR POSITIVE: Primary | ICD-10-CM

## 2024-09-04 PROCEDURE — 1159F MED LIST DOCD IN RCRD: CPT | Mod: CPTII,S$GLB,, | Performed by: INTERNAL MEDICINE

## 2024-09-04 PROCEDURE — 99999 PR PBB SHADOW E&M-EST. PATIENT-LVL IV: CPT | Mod: PBBFAC,,, | Performed by: INTERNAL MEDICINE

## 2024-09-04 PROCEDURE — 99205 OFFICE O/P NEW HI 60 MIN: CPT | Mod: S$GLB,,, | Performed by: INTERNAL MEDICINE

## 2024-09-04 PROCEDURE — 3008F BODY MASS INDEX DOCD: CPT | Mod: CPTII,S$GLB,, | Performed by: INTERNAL MEDICINE

## 2024-09-04 PROCEDURE — 1160F RVW MEDS BY RX/DR IN RCRD: CPT | Mod: CPTII,S$GLB,, | Performed by: INTERNAL MEDICINE

## 2024-09-04 PROCEDURE — 3074F SYST BP LT 130 MM HG: CPT | Mod: CPTII,S$GLB,, | Performed by: INTERNAL MEDICINE

## 2024-09-04 PROCEDURE — 3078F DIAST BP <80 MM HG: CPT | Mod: CPTII,S$GLB,, | Performed by: INTERNAL MEDICINE

## 2024-09-04 NOTE — PROGRESS NOTES
Subjective:       Patient ID: Joo Haywood is a 39 y.o. female.    Chief Complaint: new patient (Patient reports pain in right arm )      Diagnosis:  pT2, N1a, M0 stage II invasive ductal carcinoma, grade 3, ER 89.9% positive, ND 32.6% positive, HER2 Tyshawn negative by IHC (0) with a Ki-67 of 81.8%.    Current Treatment:   Likely planning adjuvant chemotherapy with dose dense AC followed by weekly Taxol.    Patient will need adjuvant radiation due to lymph node involvement and lumpectomy.  Patient will need adjuvant endocrine therapy, due to bonilla involvement would recommend ovarian suppression and aromatase inhibitor for 10 years    Treatment History:  Right-sided lumpectomy and sentinel lymph node on 08/09/2024    HPI:  39-year-old female who palpated a breast mass in January 2024.  After undergoing some insurance changes, she underwent  screening digital mammogram with tomosynthesis on 06/13/2024 at [facility].  This showed a subtle 2.0 cm round focal asymmetry in the right breast at the 12 o'clock position.  This corresponded to a lump that the patient had palpated.  This was BI-RADS 0, needs additional assessment.  Patient then underwent bilateral breast ultrasound on 06/26/2024, this revealed a mostly round hypoechoic mass with a microlobulated margin and heterogeneous texture located in the right breast at the 11 o'clock position 4 cm from the nipple measuring 2.1 x 1.8 x 2.2 cm.  This was suspicious for malignancy.  There were no other masses or other findings that were suspicious.  The right axilla showed nonspecific benign-appearing lymph nodes without evidence of malignancy.  This was read as BI-RADS 4, biopsy recommended.  Patient underwent a biopsy on 07/01/2024, pathology revealed invasive ductal carcinoma, grade 3.  Breast panel showed ER 89.9% positive, ND 32.6% positive, HER2 negative by IHC (0) with a Ki-67 of 81.8%.  The patient was then seen by Dr. Candelario Amin on  07/11/2024, plan was for right-sided lumpectomy and sentinel lymph node.  She had a breast MRI on 07/18/2024 that showed a 2.8 x 2.4 x 2.3 cm round enhancing mass with microlobulated and spiculated margins located in the right breast at the 12 o'clock position 4 cm from the nipple, this was consistent with patient's biopsy-proven invasive ductal carcinoma.  There were no lymph nodes in the right axilla or in the internal mammary chain that were suspicious.  The patient then underwent a right breast lumpectomy with sentinel lymph node on 08/09/2024.  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  Superior margin reexcision was done and showed benign tissue.  There were 8 lymph nodes removed, 1 was positive for metastatic invasive ductal carcinoma.  Final pathologic stage was pT2, N1a, M0 stage II.  The patient was referred to Medical Oncology.  I saw her on 09/04/2024.    Interval History:   Initial consult note.      Past Medical History:   Diagnosis Date    ADHD (attention deficit hyperactivity disorder)     Migraine       Past Surgical History:   Procedure Laterality Date    CERVICAL CONIZATION   W/ LASER  2013    FESS, USING COMPUTER-ASSISTED NAVIGATION, WITH NASAL TURBINATE REDUCTION  2021    Dr. Stevens    FUNCTIONAL ENDOSCOPIC SINUS SURGERY (FESS)       Social History     Socioeconomic History    Marital status: Significant Other   Tobacco Use    Smoking status: Never     Passive exposure: Never    Smokeless tobacco: Never   Substance and Sexual Activity    Alcohol use: Not Currently    Drug use: Never    Sexual activity: Yes     Partners: Male     Birth control/protection: OCP     Social Determinants of Health     Financial Resource Strain: Medium Risk (2/6/2024)    Overall Financial Resource Strain (CARDIA)     Difficulty of Paying Living Expenses: Somewhat hard   Food Insecurity: Food Insecurity Present (2/6/2024)    Hunger Vital Sign     Worried About Running Out of Food in the Last Year:  Sometimes true     Ran Out of Food in the Last Year: Never true   Transportation Needs: No Transportation Needs (2/6/2024)    PRAPARE - Transportation     Lack of Transportation (Medical): No     Lack of Transportation (Non-Medical): No   Physical Activity: Sufficiently Active (2/6/2024)    Exercise Vital Sign     Days of Exercise per Week: 3 days     Minutes of Exercise per Session: 150+ min   Recent Concern: Physical Activity - Inactive (12/5/2023)    Exercise Vital Sign     Days of Exercise per Week: 0 days     Minutes of Exercise per Session: 0 min   Stress: Stress Concern Present (2/6/2024)    [employer] [place] [employer] - Occupational Stress Questionnaire     Feeling of Stress : Very much   Housing Stability: Low Risk  (2/6/2024)    Housing Stability Vital Sign     Unable to Pay for Housing in the Last Year: No     Number of Places Lived in the Last Year: 1     Unstable Housing in the Last Year: No      Family History   Problem Relation Name Age of Onset    Ulcerative colitis Mother      Heart disease Father Anthony Clemente     Alcohol abuse Paternal Grandmother Briana clemente       Review of patient's allergies indicates:  No Known Allergies   Review of Systems   Constitutional:  Negative for appetite change and unexpected weight change.   HENT:  Negative for mouth sores.    Eyes:  Negative for visual disturbance.   Respiratory:  Negative for cough and shortness of breath.    Cardiovascular:  Negative for chest pain.   Gastrointestinal:  Negative for abdominal pain and diarrhea.   Genitourinary:  Negative for frequency.   Musculoskeletal:  Negative for back pain.   Integumentary:  Negative for rash.   Neurological:  Positive for headaches.   Hematological:  Negative for adenopathy.   Psychiatric/Behavioral:  The patient is nervous/anxious.          Objective:      Physical Exam  Vitals reviewed. Exam conducted with a chaperone present.   Constitutional:       General: She is not in acute distress.      Appearance: Normal appearance.   HENT:      Head: Normocephalic and atraumatic.      Nose: Nose normal.      Mouth/Throat:      Mouth: Mucous membranes are moist.   Eyes:      Extraocular Movements: Extraocular movements intact.      Conjunctiva/sclera: Conjunctivae normal.   Cardiovascular:      Rate and Rhythm: Normal rate and regular rhythm.      Pulses: Normal pulses.      Heart sounds: Normal heart sounds.   Pulmonary:      Effort: Pulmonary effort is normal.      Breath sounds: Normal breath sounds.   Abdominal:      General: Bowel sounds are normal.      Palpations: Abdomen is soft.   Musculoskeletal:         General: No swelling. Normal range of motion.      Cervical back: Normal range of motion and neck supple.      Right lower leg: No edema.      Left lower leg: No edema.   Lymphadenopathy:      Cervical: No cervical adenopathy.   Skin:     General: Skin is warm and dry.   Neurological:      General: No focal deficit present.      Mental Status: She is alert and oriented to person, place, and time. Mental status is at baseline.   Psychiatric:         Mood and Affect: Mood normal.         Behavior: Behavior normal.         LABS AND IMAGING REVIEWED IN EPIC          Assessment:   pT2, N1a, M0 stage II invasive ductal carcinoma, grade 3, ER 89.9% positive, [place] 32.6% positive, HER2 Tyshawn negative by IHC (0) with a Ki-67 of 81.8%.        Plan:       We had a long conversation about the overall good prognosis. We discussed her pathology results in detail today. Goal of treatment is cure.    I explained that due to her hormone receptor positivity, she would need adjuvant endocrine therapy. I would recommend 10 years of therapy due to bonilla involvement. I also discussed the possibility of ovarian suppression + aromatase inhibitor. She did inquire about having a tubal ligation and ablation.    Discussed fertility, patient is done with child bearing.    We discussed AC in a dose dense fashion every 2 weeks followed  by Taxol weekly X 12 weeks     We will use the oncotype DX score to determine whether she will need adjuvant chemotherapy, however, I feel that she has high risk disease and would benefit from chemotherapy.     I explained that she would also need radiation due to lymph node involvement and lumpectomy     She has a consult with radiation oncology tomorrow, Dr. Amanda    We will try to get CT chest, abdomen, and pelvis.     Port-a-cath placement with Dr. Amin.    Patient education after OncotypeDx.    She will return to clinic in 3-4 weeks to discuss treatment plan moving forward.    Parish Barton II, MD      I, Cindy Perez LPN, acted solely as a scribe for and in the presence of, Dr. Parish Barton who performed the service.

## 2024-09-16 ENCOUNTER — E-VISIT (OUTPATIENT)
Dept: FAMILY MEDICINE | Facility: CLINIC | Age: 39
End: 2024-09-16
Payer: COMMERCIAL

## 2024-09-16 DIAGNOSIS — G47.00 INSOMNIA, UNSPECIFIED TYPE: ICD-10-CM

## 2024-09-16 DIAGNOSIS — F41.1 ANXIETY STATE: Primary | ICD-10-CM

## 2024-09-16 PROBLEM — G47.01 INSOMNIA DUE TO MEDICAL CONDITION: Status: RESOLVED | Noted: 2024-09-16 | Resolved: 2024-09-16

## 2024-09-16 PROBLEM — G47.01 INSOMNIA DUE TO MEDICAL CONDITION: Status: ACTIVE | Noted: 2024-09-16

## 2024-09-16 RX ORDER — AMITRIPTYLINE HYDROCHLORIDE 10 MG/1
TABLET, FILM COATED ORAL
Qty: 60 TABLET | Refills: 11 | Status: SHIPPED | OUTPATIENT
Start: 2024-09-16 | End: 2024-09-18 | Stop reason: SINTOL

## 2024-09-16 NOTE — PROGRESS NOTES
Patient ID: Joo Haywood is a 39 y.o. female.    Chief Complaint: General Illness (Entered automatically based on patient selection in [employer].)    The patient initiated a request through [employer] on 9/16/2024 for evaluation and management with a chief complaint of General Illness (Entered automatically based on patient selection in [employer].)     I evaluated the questionnaire submission on 09/16/2024.    [facility] P[facility] Evisit [facility]-Medication    9/16/2024  3:34 PM CDT - Filed by Patient   What do you need help with? Medication Request   Do you agree to participate in an E-Visit? Yes   If you have any of the following symptoms, please present to your local emergency room or call 911:  I acknowledge   Medication requests for narcotics will not be addressed via an E-Visit.  Please schedule an appointment. I acknowledge   Are you pregnant, could you be pregnant, or are you breast feeding? None of the above   Do you want to address a new or existing medication? I would like to start a new medication that I do not already take   What is the main issue you would like addressed today? Sleep   What is the name of the medication that you would like to start? Unknown   Have you taken a similar medication in the past? Yes   What was the name of the similar medication? Xanax   Why are you no longer on that medication? Medication does not work   What effect has your medication had on your problem? Less than expected    What medical condition is the  medication intended to treat? Sleep / anxiety   Provide any additional information you feel is important.    Please attach any relevant images or files    Are you able to take your vital signs? Yes   Systolic Blood Pressure: 126   Diastolic Blood Pressure: 77   Weight: 116   Height: 63   Pulse:    Temperature: 97.6   Respiration rate: 18   Pulse Oxygen: 99         Encounter Diagnoses   Name Primary?    Anxiety state Yes    Insomnia, unspecified type         No orders of the  defined types were placed in this encounter.     Medications Ordered This Encounter   Medications    amitriptyline (ELAVIL) 10 MG tablet     Sig: Take 1-2 tablets by mouth at bedtime for sleep/anxiety     Dispense:  60 tablet     Refill:  11        Follow up in 3 months (on 12/11/2024) for Annual.      E-Visit Time Tracking:    Day 1 Time (in minutes): 11    Total Time (in minutes): 11

## 2024-09-18 ENCOUNTER — PATIENT MESSAGE (OUTPATIENT)
Dept: FAMILY MEDICINE | Facility: CLINIC | Age: 39
End: 2024-09-18
Payer: COMMERCIAL

## 2024-09-18 DIAGNOSIS — G47.00 INSOMNIA, UNSPECIFIED TYPE: Primary | ICD-10-CM

## 2024-09-18 RX ORDER — TRAZODONE HYDROCHLORIDE 50 MG/1
50 TABLET ORAL NIGHTLY
Qty: 30 TABLET | Refills: 11 | Status: SHIPPED | OUTPATIENT
Start: 2024-09-18 | End: 2025-09-18

## 2024-09-19 DIAGNOSIS — F90.0 ATTENTION DEFICIT HYPERACTIVITY DISORDER (ADHD), PREDOMINANTLY INATTENTIVE TYPE: ICD-10-CM

## 2024-09-19 RX ORDER — DEXTROAMPHETAMINE SACCHARATE, AMPHETAMINE ASPARTATE, DEXTROAMPHETAMINE SULFATE AND AMPHETAMINE SULFATE 5; 5; 5; 5 MG/1; MG/1; MG/1; MG/1
1 TABLET ORAL 3 TIMES DAILY
Qty: 90 TABLET | Refills: 0 | Status: SHIPPED | OUTPATIENT
Start: 2024-09-19

## 2024-09-20 DIAGNOSIS — F90.0 ATTENTION DEFICIT HYPERACTIVITY DISORDER (ADHD), PREDOMINANTLY INATTENTIVE TYPE: ICD-10-CM

## 2024-09-23 RX ORDER — DEXTROAMPHETAMINE SACCHARATE, AMPHETAMINE ASPARTATE, DEXTROAMPHETAMINE SULFATE AND AMPHETAMINE SULFATE 5; 5; 5; 5 MG/1; MG/1; MG/1; MG/1
1 TABLET ORAL 3 TIMES DAILY
Qty: 90 TABLET | Refills: 0 | Status: SHIPPED | OUTPATIENT
Start: 2024-09-23

## 2024-10-02 DIAGNOSIS — F41.1 ANXIETY STATE: ICD-10-CM

## 2024-10-02 RX ORDER — ALPRAZOLAM 0.5 MG/1
TABLET ORAL
Qty: 60 TABLET | Refills: 2 | OUTPATIENT
Start: 2024-10-02

## 2024-10-03 NOTE — PROGRESS NOTES
Subjective:       Patient ID: Joo Haywood is a 39 y.o. female.    Chief Complaint: No chief complaint on file.      Diagnosis:  pT2, N1a, M0 stage II invasive ductal carcinoma, grade 3, ER 89.9% positive, [place] 32.6% positive, HER2 Tyshawn negative by IHC (0) with a Ki-67 of 81.8%.    Current Treatment:   Adjuvant chemotherapy with dose dense AC followed by weekly Taxol.  Plan to start on 10/28/2024.  Patient will need adjuvant radiation due to lymph node involvement and lumpectomy.  Patient will need adjuvant endocrine therapy, due to bonilla involvement would recommend ovarian suppression and aromatase inhibitor for 10 years    Treatment History:  Right-sided lumpectomy and sentinel lymph node on 08/09/2024    HPI:  39-year-old female who palpated a breast mass in January 2024.  After undergoing some insurance changes, she underwent  screening digital mammogram with tomosynthesis on 06/13/2024 at [facility] Center [facility].  This showed a subtle 2.0 cm round focal asymmetry in the right breast at the 12 o'clock position.  This corresponded to a lump that the patient had palpated.  This was BI-RADS 0, needs additional assessment.  Patient then underwent bilateral breast ultrasound on 06/26/2024, this revealed a mostly round hypoechoic mass with a microlobulated margin and heterogeneous texture located in the right breast at the 11 o'clock position 4 cm from the nipple measuring 2.1 x 1.8 x 2.2 cm.  This was suspicious for malignancy.  There were no other masses or other findings that were suspicious.  The right axilla showed nonspecific benign-appearing lymph nodes without evidence of malignancy.  This was read as BI-RADS 4, biopsy recommended.  Patient underwent a biopsy on 07/01/2024, pathology revealed invasive ductal carcinoma, grade 3.  Breast panel showed ER 89.9% positive, OK 32.6% positive, HER2 negative by IHC (0) with a Ki-67 of 81.8%.  The patient was then seen by Dr. Candelario Amin on 07/11/2024, plan  was for right-sided lumpectomy and sentinel lymph node.  She had a breast MRI on 07/18/2024 that showed a 2.8 x 2.4 x 2.3 cm round enhancing mass with microlobulated and spiculated margins located in the right breast at the 12 o'clock position 4 cm from the nipple, this was consistent with patient's biopsy-proven invasive ductal carcinoma.  There were no lymph nodes in the right axilla or in the internal mammary chain that were suspicious.  The patient then underwent a right breast lumpectomy with sentinel lymph node on 08/09/2024.  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  Superior margin reexcision was done and showed benign tissue.  There were 8 lymph nodes removed, 1 was positive for metastatic invasive ductal carcinoma.  Final pathologic stage was pT2, N1a, M0 stage II.  I initially saw the patient on 09/04/2024. Oncotype DX returned with a score of 44, greater than 12% chance of distant recurrence at 9 years with an AI or tamoxifen alone, and chemotherapy benefit.     CT chest, abdomen, and pelvis done on 10/02/2024 showed no evidence of metastatic disease in the chest, abdomen, or pelvis.    Interval History:   Patient presents to clinic for scheduled follow up appointment to discuss Oncotype score.  She understands that she has a high-risk Oncotype score and that she would benefit from chemotherapy.  She is willing to get started on chemotherapy.      Past Medical History:   Diagnosis Date    ADHD (attention deficit hyperactivity disorder)     Migraine       Past Surgical History:   Procedure Laterality Date    CERVICAL CONIZATION   W/ LASER  2013    FESS, USING COMPUTER-ASSISTED NAVIGATION, WITH NASAL TURBINATE REDUCTION  2021    Dr. Stevens    FUNCTIONAL ENDOSCOPIC SINUS SURGERY (FESS)       Social History     Socioeconomic History    Marital status: Significant Other   Tobacco Use    Smoking status: Never     Passive exposure: Never    Smokeless tobacco: Never   Substance and Sexual  Activity    Alcohol use: Not Currently    Drug use: Never    Sexual activity: Yes     Partners: Male     Birth control/protection: OCP     Social Drivers of Health     Financial Resource Strain: Medium Risk (2/6/2024)    Overall Financial Resource Strain (CARDIA)     Difficulty of Paying Living Expenses: Somewhat hard   Food Insecurity: Food Insecurity Present (2/6/2024)    Hunger Vital Sign     Worried About Running Out of Food in the Last Year: Sometimes true     Ran Out of Food in the Last Year: Never true   Transportation Needs: No Transportation Needs (2/6/2024)    PRAPARE - Transportation     Lack of Transportation (Medical): No     Lack of Transportation (Non-Medical): No   Physical Activity: Sufficiently Active (2/6/2024)    Exercise Vital Sign     Days of Exercise per Week: 3 days     Minutes of Exercise per Session: 150+ min   Recent Concern: Physical Activity - Inactive (12/5/2023)    Exercise Vital Sign     Days of Exercise per Week: 0 days     Minutes of Exercise per Session: 0 min   Stress: Stress Concern Present (2/6/2024)    [employer] [employer] - Occupational Stress Questionnaire     Feeling of Stress : Very much   Housing Stability: Low Risk  (2/6/2024)    Housing Stability Vital Sign     Unable to Pay for Housing in the Last Year: No     Number of Places Lived in the Last Year: 1     Unstable Housing in the Last Year: No      Family History   Problem Relation Name Age of Onset    Ulcerative colitis Mother      Heart disease Father Anthony Clemente     Alcohol abuse Paternal Grandmother Briana clemente       Review of patient's allergies indicates:  No Known Allergies   Review of Systems   Constitutional:  Negative for appetite change and unexpected weight change.   HENT:  Negative for mouth sores.    Eyes:  Negative for visual disturbance.   Respiratory:  Negative for cough and shortness of breath.    Cardiovascular:  Negative for chest pain.   Gastrointestinal:  Negative for  abdominal pain and diarrhea.   Genitourinary:  Negative for frequency.   Musculoskeletal:  Negative for back pain.   Integumentary:  Negative for rash.   Neurological:  Positive for headaches.   Hematological:  Negative for adenopathy.   Psychiatric/Behavioral:  The patient is nervous/anxious.          Objective:      Physical Exam  Vitals reviewed. Exam conducted with a chaperone present.   Constitutional:       General: She is not in acute distress.     Appearance: Normal appearance.   HENT:      Head: Normocephalic and atraumatic.      Nose: Nose normal.      Mouth/Throat:      Mouth: Mucous membranes are moist.   Eyes:      Extraocular Movements: Extraocular movements intact.      Conjunctiva/sclera: Conjunctivae normal.   Cardiovascular:      Rate and Rhythm: Normal rate and regular rhythm.      Pulses: Normal pulses.      Heart sounds: Normal heart sounds.   Pulmonary:      Effort: Pulmonary effort is normal.      Breath sounds: Normal breath sounds.   Abdominal:      General: Bowel sounds are normal.      Palpations: Abdomen is soft.   Musculoskeletal:         General: No swelling. Normal range of motion.      Cervical back: Normal range of motion and neck supple.      Right lower leg: No edema.      Left lower leg: No edema.   Lymphadenopathy:      Cervical: No cervical adenopathy.   Skin:     General: Skin is warm and dry.   Neurological:      General: No focal deficit present.      Mental Status: She is alert and oriented to person, place, and time. Mental status is at baseline.   Psychiatric:         Mood and Affect: Mood normal.         Behavior: Behavior normal.       LABS AND IMAGING REVIEWED IN EPIC          Assessment:   pT2, N1a, M0 stage II invasive ductal carcinoma, grade 3, ER 89.9% positive, [place] 32.6% positive, HER2 Tyshawn negative by IHC (0) with a Ki-67 of 81.8%.        Plan:       We had a long conversation about the overall good prognosis.  We discussed Oncotype DX results.  Goal of treatment is  cure.    I recommended dose dense AC followed by weekly Taxol.    She will then need adjuvant radiation.    I explained that due to her hormone receptor positivity, she would need adjuvant endocrine therapy. I would recommend 10 years of therapy due to bonilla involvement. I also discussed the possibility of ovarian suppression + aromatase inhibitor. She did inquire about having a tubal ligation and ablation, but now her plan is to have a bilateral oophorectomy in July 2025.    Discussed fertility, patient is done with child bearing.    Radiation oncology,  Prellop    CT chest, abdomen, and pelvis done on 10/02/2024 showed no evidence of disease.    Port-a-cath placement with Dr. Amin on 10/18/2024    Planning to start AC in a dose dense fashion every 2 weeks 10/28/2024.    I will set her up for patient education, labs and echocardiogram prior to starting treatment    She will return to clinic prior to cycle 2 with labs    Parish Barton II, MD      I, Cindy Perez LPN, acted solely as a scribe for and in the presence of, Dr. Parish Barton who performed the service.

## 2024-10-04 ENCOUNTER — OFFICE VISIT (OUTPATIENT)
Dept: HEMATOLOGY/ONCOLOGY | Facility: CLINIC | Age: 39
End: 2024-10-04
Payer: COMMERCIAL

## 2024-10-04 VITALS
RESPIRATION RATE: 18 BRPM | OXYGEN SATURATION: 100 % | HEIGHT: 63 IN | WEIGHT: 114 LBS | HEART RATE: 75 BPM | SYSTOLIC BLOOD PRESSURE: 122 MMHG | DIASTOLIC BLOOD PRESSURE: 87 MMHG | BODY MASS INDEX: 20.2 KG/M2

## 2024-10-04 DIAGNOSIS — Z51.81 ENCOUNTER FOR MONITORING CARDIOTOXIC DRUG THERAPY: ICD-10-CM

## 2024-10-04 DIAGNOSIS — Z79.899 ENCOUNTER FOR MONITORING CARDIOTOXIC DRUG THERAPY: ICD-10-CM

## 2024-10-04 DIAGNOSIS — C50.911 INFILTRATING DUCTAL CARCINOMA OF RIGHT BREAST: ICD-10-CM

## 2024-10-04 DIAGNOSIS — C77.3 MALIGNANT NEOPLASM METASTATIC TO LYMPH NODE OF AXILLA: Primary | ICD-10-CM

## 2024-10-04 PROCEDURE — 99999 PR PBB SHADOW E&M-EST. PATIENT-LVL V: CPT | Mod: PBBFAC,,, | Performed by: INTERNAL MEDICINE

## 2024-10-04 NOTE — PLAN OF CARE
START ON PATHWAY REGIMEN - Breast    [id number]        Doxorubicin       Cyclophosphamide       [id number]       Paclitaxel           Additional Orders: Assess LVEF prior to initiation of doxorubicin and as   clinically indicated during and after treatment. Doxorubicin can cause   myocardial damage, including acute left ventricular failure. Risk of   cardiomyopathy is generally proportional to cumulative   anthracycline/anthracenedione exposure. Use of concomitant cardiotoxic agents,   previous radiotherapy to the mediastinum, or presence/history of cardiovascular   disease can additionally increase cardiomyopathy risk. See PI for details.    **Always confirm dose/schedule in your pharmacy ordering system**    Patient Characteristics:  Postoperative without Neoadjuvant Therapy, M0 (Pathologic Staging), Invasive   Disease, Adjuvant Therapy, HER2 Negative, ER Positive, Node Positive, Node   Positive (1-3), Oncotyping Ordered, Oncotype High Risk (? 26)  Therapeutic Status: Postoperative without Neoadjuvant Therapy, M0 (Pathologic   Staging)  AJCC Grade: G3  AJCC N Category: pN1a  AJCC M Category: cM0  ER Status: Positive (+)  AJCC 8 Stage Grouping: IIA  HER2 Status: Negative (-)  Oncotype Dx Recurrence Score: 44  AJCC T Category: pT2  DC Status: Positive (+)  Has this patient completed genomic testing<= Yes - Oncotype DX(R)  Intent of Therapy:  Curative Intent, Discussed with Patient

## 2024-10-08 ENCOUNTER — TELEPHONE (OUTPATIENT)
Dept: HEMATOLOGY/ONCOLOGY | Facility: CLINIC | Age: 39
End: 2024-10-08
Payer: COMMERCIAL

## 2024-10-08 DIAGNOSIS — C77.3 MALIGNANT NEOPLASM METASTATIC TO LYMPH NODE OF AXILLA: Primary | ICD-10-CM

## 2024-10-08 NOTE — TELEPHONE ENCOUNTER
Joo returned my call. I explained the purpose of my call and my role at [facility]. She was opened and forthcoming about her emotional health. She has a lot going on in her life currently. I will plan on meeting her when she returns to see Dr. Barton. We spent about 30 minutes on the phone. She has my number and will reach out to me if any needs arise.   There are no Wet Read(s) to document.

## 2024-10-10 ENCOUNTER — HOSPITAL ENCOUNTER (OUTPATIENT)
Dept: CARDIOLOGY | Facility: HOSPITAL | Age: 39
Discharge: HOME | End: 2024-10-10
Attending: INTERNAL MEDICINE
Payer: COMMERCIAL

## 2024-10-10 DIAGNOSIS — Z79.899 ENCOUNTER FOR MONITORING CARDIOTOXIC DRUG THERAPY: ICD-10-CM

## 2024-10-10 DIAGNOSIS — C77.3 MALIGNANT NEOPLASM METASTATIC TO LYMPH NODE OF AXILLA: ICD-10-CM

## 2024-10-10 DIAGNOSIS — Z51.81 ENCOUNTER FOR MONITORING CARDIOTOXIC DRUG THERAPY: ICD-10-CM

## 2024-10-10 LAB
APICAL FOUR CHAMBER EJECTION FRACTION: 64 %
APICAL TWO CHAMBER EJECTION FRACTION: 62 %
AV INDEX (PROSTH): 0.75
AV MEAN GRADIENT: 5 MMHG
AV PEAK GRADIENT: 7.8 MMHG
AV VALVE AREA BY VELOCITY RATIO: 2.5 CM²
AV VALVE AREA: 2.4 CM²
AV VELOCITY RATIO: 0.79
CV ECHO LV RWT: 0.47 CM
DOP CALC AO PEAK VEL: 1.4 M/S
DOP CALC AO VTI: 29.6 CM
DOP CALC LVOT AREA: 3.1 CM2
DOP CALC LVOT DIAMETER: 2 CM
DOP CALC LVOT PEAK VEL: 1.1 M/S
DOP CALC LVOT STROKE VOLUME: 69.7 CM3
DOP CALC MV VTI: 36.9 CM
DOP CALCLVOT PEAK VEL VTI: 22.2 CM
E WAVE DECELERATION TIME: 248 MSEC
E/A RATIO: 2.1
E/E' RATIO: 6.59 M/S
ECHO LV POSTERIOR WALL: 0.8 CM (ref 0.6–1.1)
FRACTIONAL SHORTENING: 29.4 % (ref 28–44)
GLOBAL LONGITUIDAL STRAIN: 25 %
HR MV ECHO: 71 BPM
INTERVENTRICULAR SEPTUM: 0.8 CM (ref 0.6–1.1)
LEFT ATRIUM AREA SYSTOLIC (APICAL 2 CHAMBER): 10.3 CM2
LEFT ATRIUM AREA SYSTOLIC (APICAL 4 CHAMBER): 8.77 CM2
LEFT ATRIUM SIZE: 2.4 CM
LEFT ATRIUM VOLUME MOD: 16 ML
LEFT INTERNAL DIMENSION IN SYSTOLE: 2.4 CM (ref 2.1–4)
LEFT VENTRICLE DIASTOLIC VOLUME: 47.8 ML
LEFT VENTRICLE END DIASTOLIC VOLUME APICAL 2 CHAMBER: 61.2 ML
LEFT VENTRICLE END DIASTOLIC VOLUME APICAL 4 CHAMBER: 58.4 ML
LEFT VENTRICLE END SYSTOLIC VOLUME APICAL 2 CHAMBER: 18.3 ML
LEFT VENTRICLE END SYSTOLIC VOLUME APICAL 4 CHAMBER: 13.6 ML
LEFT VENTRICLE SYSTOLIC VOLUME: 19.5 ML
LEFT VENTRICULAR INTERNAL DIMENSION IN DIASTOLE: 3.4 CM (ref 3.5–6)
LEFT VENTRICULAR MASS: 71.9 G
LV LATERAL E/E' RATIO: 5.3 M/S
LV SEPTAL E/E' RATIO: 8.71 M/S
LVED V (TEICH): 47.8 ML
LVES V (TEICH): 19.5 ML
LVOT MG: 2 MMHG
LVOT MV: 0.71 CM/S
MV MEAN GRADIENT: 2 MMHG
MV PEAK A VEL: 0.58 M/S
MV PEAK E VEL: 1.22 M/S
MV PEAK GRADIENT: 8 MMHG
MV STENOSIS PRESSURE HALF TIME: 89 MS
MV VALVE AREA BY CONTINUITY EQUATION: 1.89 CM2
MV VALVE AREA P 1/2 METHOD: 2.47 CM2
OHS LV EJECTION FRACTION SIMPSONS BIPLANE MOD: 63 %
PISA TR MAX VEL: 1.8 M/S
RA PRESSURE ESTIMATED: 8 MMHG
RV TB RVSP: 10 MMHG
SINUS: 2.8 CM
TDI LATERAL: 0.23 M/S
TDI SEPTAL: 0.14 M/S
TDI: 0.19 M/S
TR MAX PG: 13 MMHG
TRICUSPID ANNULAR PLANE SYSTOLIC EXCURSION: 2.46 CM
TV REST PULMONARY ARTERY PRESSURE: 21 MMHG

## 2024-10-10 PROCEDURE — 93306 TTE W/DOPPLER COMPLETE: CPT

## 2024-10-10 PROCEDURE — 93356 MYOCRD STRAIN IMG SPCKL TRCK: CPT

## 2024-10-17 ENCOUNTER — PATIENT MESSAGE (OUTPATIENT)
Dept: FAMILY MEDICINE | Facility: CLINIC | Age: 39
End: 2024-10-17
Payer: COMMERCIAL

## 2024-10-25 ENCOUNTER — OFFICE VISIT (OUTPATIENT)
Dept: HEMATOLOGY/ONCOLOGY | Facility: CLINIC | Age: 39
End: 2024-10-25
Payer: COMMERCIAL

## 2024-10-25 ENCOUNTER — LAB VISIT (OUTPATIENT)
Dept: LAB | Facility: HOSPITAL | Age: 39
End: 2024-10-25
Attending: INTERNAL MEDICINE
Payer: COMMERCIAL

## 2024-10-25 VITALS — HEIGHT: 63 IN | BODY MASS INDEX: 20.2 KG/M2 | WEIGHT: 114 LBS

## 2024-10-25 DIAGNOSIS — C77.3 MALIGNANT NEOPLASM METASTATIC TO LYMPH NODE OF AXILLA: ICD-10-CM

## 2024-10-25 DIAGNOSIS — C77.3 MALIGNANT NEOPLASM METASTATIC TO LYMPH NODE OF AXILLA: Primary | ICD-10-CM

## 2024-10-25 DIAGNOSIS — C50.911 INFILTRATING DUCTAL CARCINOMA OF RIGHT BREAST: ICD-10-CM

## 2024-10-25 LAB
ALBUMIN SERPL-MCNC: 3.9 G/DL (ref 3.5–5)
ALBUMIN/GLOB SERPL: 1.4 RATIO (ref 1.1–2)
ALP SERPL-CCNC: 56 UNIT/L (ref 40–150)
ALT SERPL-CCNC: 49 UNIT/L (ref 0–55)
ANION GAP SERPL CALC-SCNC: 4 MEQ/L
AST SERPL-CCNC: 54 UNIT/L (ref 5–34)
BASOPHILS # BLD AUTO: 0.02 X10(3)/MCL
BASOPHILS NFR BLD AUTO: 0.3 %
BILIRUB SERPL-MCNC: 0.5 MG/DL
BUN SERPL-MCNC: 15.5 MG/DL (ref 7–18.7)
CALCIUM SERPL-MCNC: 9.3 MG/DL (ref 8.4–10.2)
CHLORIDE SERPL-SCNC: 107 MMOL/L (ref 98–107)
CO2 SERPL-SCNC: 28 MMOL/L (ref 22–29)
CREAT SERPL-MCNC: 0.77 MG/DL (ref 0.55–1.02)
CREAT/UREA NIT SERPL: 20
EOSINOPHIL # BLD AUTO: 0.61 X10(3)/MCL (ref 0–0.9)
EOSINOPHIL NFR BLD AUTO: 8.7 %
ERYTHROCYTE [DISTWIDTH] IN BLOOD BY AUTOMATED COUNT: 13.4 % (ref 11.5–17)
GFR SERPLBLD CREATININE-BSD FMLA CKD-EPI: >60 ML/MIN/1.73/M2
GLOBULIN SER-MCNC: 2.8 GM/DL (ref 2.4–3.5)
GLUCOSE SERPL-MCNC: 92 MG/DL (ref 74–100)
HCT VFR BLD AUTO: 37.7 % (ref 37–47)
HGB BLD-MCNC: 12.4 G/DL (ref 12–16)
IMM GRANULOCYTES # BLD AUTO: 0.01 X10(3)/MCL (ref 0–0.04)
IMM GRANULOCYTES NFR BLD AUTO: 0.1 %
LYMPHOCYTES # BLD AUTO: 1.93 X10(3)/MCL (ref 0.6–4.6)
LYMPHOCYTES NFR BLD AUTO: 27.6 %
MAGNESIUM SERPL-MCNC: 1.9 MG/DL (ref 1.6–2.6)
MCH RBC QN AUTO: 29.9 PG (ref 27–31)
MCHC RBC AUTO-ENTMCNC: 32.9 G/DL (ref 33–36)
MCV RBC AUTO: 90.8 FL (ref 80–94)
MONOCYTES # BLD AUTO: 0.67 X10(3)/MCL (ref 0.1–1.3)
MONOCYTES NFR BLD AUTO: 9.6 %
NEUTROPHILS # BLD AUTO: 3.75 X10(3)/MCL (ref 2.1–9.2)
NEUTROPHILS NFR BLD AUTO: 53.7 %
PLATELET # BLD AUTO: 220 X10(3)/MCL (ref 130–400)
PMV BLD AUTO: 8.3 FL (ref 7.4–10.4)
POTASSIUM SERPL-SCNC: 4.6 MMOL/L (ref 3.5–5.1)
PROT SERPL-MCNC: 6.7 GM/DL (ref 6.4–8.3)
RBC # BLD AUTO: 4.15 X10(6)/MCL (ref 4.2–5.4)
SODIUM SERPL-SCNC: 139 MMOL/L (ref 136–145)
WBC # BLD AUTO: 6.99 X10(3)/MCL (ref 4.5–11.5)

## 2024-10-25 PROCEDURE — 36415 COLL VENOUS BLD VENIPUNCTURE: CPT

## 2024-10-25 PROCEDURE — 85025 COMPLETE CBC W/AUTO DIFF WBC: CPT

## 2024-10-25 PROCEDURE — 99999 PR PBB SHADOW E&M-EST. PATIENT-LVL III: CPT | Mod: PBBFAC,,,

## 2024-10-25 PROCEDURE — 80053 COMPREHEN METABOLIC PANEL: CPT

## 2024-10-25 PROCEDURE — 83735 ASSAY OF MAGNESIUM: CPT

## 2024-10-25 RX ORDER — EPINEPHRINE 0.3 MG/.3ML
0.3 INJECTION SUBCUTANEOUS ONCE AS NEEDED
OUTPATIENT
Start: 2024-10-28

## 2024-10-25 RX ORDER — DOXORUBICIN HYDROCHLORIDE 2 MG/ML
60 INJECTION, SOLUTION INTRAVENOUS
OUTPATIENT
Start: 2024-10-28

## 2024-10-25 RX ORDER — DEXAMETHASONE 4 MG/1
8 TABLET ORAL DAILY
Qty: 6 TABLET | Refills: 3 | Status: SHIPPED | OUTPATIENT
Start: 2024-10-27

## 2024-10-25 RX ORDER — OLANZAPINE 5 MG/1
5 TABLET ORAL NIGHTLY
Qty: 4 TABLET | Refills: 3 | Status: SHIPPED | OUTPATIENT
Start: 2024-10-27

## 2024-10-25 RX ORDER — HEPARIN 100 UNIT/ML
500 SYRINGE INTRAVENOUS
OUTPATIENT
Start: 2024-10-28

## 2024-10-25 RX ORDER — PROCHLORPERAZINE MALEATE 10 MG
10 TABLET ORAL 4 TIMES DAILY PRN
Qty: 30 TABLET | Refills: 1 | Status: SHIPPED | OUTPATIENT
Start: 2024-10-27

## 2024-10-25 RX ORDER — SODIUM CHLORIDE 0.9 % (FLUSH) 0.9 %
10 SYRINGE (ML) INJECTION
OUTPATIENT
Start: 2024-10-28

## 2024-10-25 RX ORDER — DIPHENHYDRAMINE HYDROCHLORIDE 50 MG/ML
50 INJECTION INTRAMUSCULAR; INTRAVENOUS ONCE AS NEEDED
OUTPATIENT
Start: 2024-10-28

## 2024-10-25 NOTE — PROGRESS NOTES
THERAPY EDUCATION: DOXORUBICIN + CYCLOPHOSPHAMIDE + PACLITAXEL   Subjective:      Patient ID: Joo Haywood is a 39 y.o. female.    Chief Complaint: Therapy Education       Diagnosis:  pT2, N1a, M0 stage II invasive ductal carcinoma, grade 3, ER 89.9% positive, AL 32.6% positive, HER2 Tyshawn negative by IHC (0) with a Ki-67 of 81.8%.    Current Treatment:   Adjuvant chemotherapy with dose dense AC followed by weekly Taxol.  Plan to start on 10/28/2024.  Patient will need adjuvant radiation due to lymph node involvement and lumpectomy.  Patient will need adjuvant endocrine therapy, due to bonilla involvement would recommend ovarian suppression and aromatase inhibitor for 10 years    Treatment History:  Right-sided lumpectomy and sentinel lymph node on 08/09/2024    HPI:  39-year-old female who palpated a breast mass in January 2024.  After undergoing some insurance changes, she underwent  screening digital mammogram with tomosynthesis on 06/13/2024 at [facility].  This showed a subtle 2.0 cm round focal asymmetry in the right breast at the 12 o'clock position.  This corresponded to a lump that the patient had palpated.  This was BI-RADS 0, needs additional assessment.  Patient then underwent bilateral breast ultrasound on 06/26/2024, this revealed a mostly round hypoechoic mass with a microlobulated margin and heterogeneous texture located in the right breast at the 11 o'clock position 4 cm from the nipple measuring 2.1 x 1.8 x 2.2 cm.  This was suspicious for malignancy.  There were no other masses or other findings that were suspicious.  The right axilla showed nonspecific benign-appearing lymph nodes without evidence of malignancy.  This was read as BI-RADS 4, biopsy recommended.  Patient underwent a biopsy on 07/01/2024, pathology revealed invasive ductal carcinoma, grade 3.  Breast panel showed ER 89.9% positive, AL 32.6% positive, HER2 negative by IHC (0) with a Ki-67 of 81.8%.  The patient  was then seen by Dr. Candelario Amin on 07/11/2024, plan was for right-sided lumpectomy and sentinel lymph node.  She had a breast MRI on 07/18/2024 that showed a 2.8 x 2.4 x 2.3 cm round enhancing mass with microlobulated and spiculated margins located in the right breast at the 12 o'clock position 4 cm from the nipple, this was consistent with patient's biopsy-proven invasive ductal carcinoma.  There were no lymph nodes in the right axilla or in the internal mammary chain that were suspicious.  The patient then underwent a right breast lumpectomy with sentinel lymph node on 08/09/2024.  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  Superior margin reexcision was done and showed benign tissue.  There were 8 lymph nodes removed, 1 was positive for metastatic invasive ductal carcinoma.  Final pathologic stage was pT2, N1a, M0 stage II.  I initially saw the patient on 09/04/2024. Oncotype DX returned with a score of 44, greater than 12% chance of distant recurrence at 9 years with an AI or tamoxifen alone, and chemotherapy benefit.     CT chest, abdomen, and pelvis done on 10/02/2024 showed no evidence of metastatic disease in the chest, abdomen, or pelvis.    Interval History:     10/25/24: Ms. Malloy presents to the clinic by herself for therapy education. Patient reports no major complaints at today's visit. Denies fever, chills, SOB, N/V/D, constipation, recent infection, chest pain or unexplained bleeding or bruising.      10/4/24:Patient presents to clinic for scheduled follow up appointment to discuss Oncotype score.  She understands that she has a high-risk Oncotype score and that she would benefit from chemotherapy.  She is willing to get started on chemotherapy.      Past Medical History:   Diagnosis Date    ADHD (attention deficit hyperactivity disorder)     Migraine       Past Surgical History:   Procedure Laterality Date    CERVICAL CONIZATION   W/ LASER  2013    FESS, USING  COMPUTER-ASSISTED NAVIGATION, WITH NASAL TURBINATE REDUCTION  2021    Dr. Clemente    FUNCTIONAL ENDOSCOPIC SINUS SURGERY (FESS)       Social History     Socioeconomic History    Marital status: Significant Other   Tobacco Use    Smoking status: Never     Passive exposure: Never    Smokeless tobacco: Never   Substance and Sexual Activity    Alcohol use: Not Currently    Drug use: Never    Sexual activity: Yes     Partners: Male     Birth control/protection: OCP     Social Drivers of Health     Financial Resource Strain: Medium Risk (2/6/2024)    Overall Financial Resource Strain (CARDIA)     Difficulty of Paying Living Expenses: Somewhat hard   Food Insecurity: Food Insecurity Present (2/6/2024)    Hunger Vital Sign     Worried About Running Out of Food in the Last Year: Sometimes true     Ran Out of Food in the Last Year: Never true   Transportation Needs: No Transportation Needs (2/6/2024)    PRAPARE - Transportation     Lack of Transportation (Medical): No     Lack of Transportation (Non-Medical): No   Physical Activity: Sufficiently Active (2/6/2024)    Exercise Vital Sign     Days of Exercise per Week: 3 days     Minutes of Exercise per Session: 150+ min   Recent Concern: Physical Activity - Inactive (12/5/2023)    Exercise Vital Sign     Days of Exercise per Week: 0 days     Minutes of Exercise per Session: 0 min   Stress: Stress Concern Present (2/6/2024)    [employer] [place] [employer] - Occupational Stress Questionnaire     Feeling of Stress : Very much   Housing Stability: Low Risk  (2/6/2024)    Housing Stability Vital Sign     Unable to Pay for Housing in the Last Year: No     Number of Places Lived in the Last Year: 1     Unstable Housing in the Last Year: No      Family History   Problem Relation Name Age of Onset    Ulcerative colitis Mother      Heart disease Father Anthony Clemente     Alcohol abuse Paternal Grandmother Briana clemente       Review of patient's allergies indicates:  No Known  Allergies   Review of Systems   Constitutional:  Negative for appetite change and unexpected weight change.   HENT:  Negative for mouth sores.    Eyes:  Negative for visual disturbance.   Respiratory:  Negative for cough and shortness of breath.    Cardiovascular:  Negative for chest pain.   Gastrointestinal:  Negative for abdominal pain and diarrhea.   Genitourinary:  Negative for frequency.   Musculoskeletal:  Negative for back pain.   Integumentary:  Negative for rash.   Neurological:  Positive for headaches.   Hematological:  Negative for adenopathy.   Psychiatric/Behavioral:  The patient is nervous/anxious.        Objective:     Assessment:   pT2, N1a, M0 stage II invasive ductal carcinoma, grade 3, ER 89.9% positive, MI 32.6% positive, HER2 Tyshawn negative by IHC (0) with a Ki-67 of 81.8%.  Plan:   -Mediport placement completed on 10/18/24. Lidocaine cream given with instructions to apply on port 30 minutes before treatment.   -Therapy education completed on 10/25/24.  -Plans to start AC-T with Neulasta support on 10/28/24. Patient understood that she will receive premedications given 30 minutes prior to each treatment to help prevent nausea and allergic reaction. Patient also understood to take OTC Claritin to help reduce bone pain.  -Antiemetic regimen schedule given and calendar made for guidance    Dexamethasone- Take 2 tablet by mouth daily on Days 2-4 of first 4 cycles of each treatment.    Olanzapine- Take 1 tablet by mouth daily in the evening on Days 1-4 of first 4 cycles of each treatment    -Compazine PRN prescribed for at home with instructions to be taken by mouth every 6 hours as needed for nausea. If not working, Compazine can be prescribed as 2nd choice.    -OTC Imodium AD recommended for diarrhea (4-5 BMs a day). Take 2 tablets after the first loose bowel movement, and 1 tablet after each loose bowel movement after the first dose has been taken. No more than 4 tablets should be taken in any  24-hour period. If not working, Lomotil can be prescribed as 2nd choice.    -Mouth sore prevention with 1-quart warm water with 1 tsp of baking soda and salt and alcohol-free mouthwash.   -Emphasized adequate hand-hygiene and limited contact with people who are sick.   -Monitor and notify any bleeding in urine, stool, or sputum.  As well as unusual bleeding or bruising and stomach pain.   - Emphasized hydration with 4 16 oz bottles of water a day.    -Call clinic if fever >100.4, shakes or chills, shortness of breath, chest pain, uncontrolled vomiting or diarrhea, pain and tingling in the chest or arm, or just not feeling well.    - Plans to RTC on 11/8/24 for same day labs and toxicity check with MD.    DISCUSSION:    1.  A total of 60 minutes were spent in counseling today, in which 100% were face-to-face.  At today's therapy teaching session, we discussed the patient's cancer diagnosis as well as planned therapy regimen, protocol, side effects and toxicities.  A handout of each therapeutic agent in the regimen was provided and reviewed in detail.    2.  The following side effects were discussed but not limited to:    Doxorubicin Side Effects:  Allergic Reactions  Breathing Problems  Bruising  Chest Pain  Chills  Cough  Diarrhea  Fever  Hair Loss  Infection  Itching  Low Blood Counts  Mouth Sores  Nausea  Pain  Rash  Swelling  Vomiting  Weakness    Cyclophosphamide Side Effects:  Allergic Reactions  Anemia  Breathing Problems  Bruising  Chills  Confusion  Cough  Diarrhea  Feeling Faint  Fever  Flushing  Hair Loss  Headache  Heart Failure  Infection  Injury  Itching  Nausea  Pain  Rash  Swelling  Vomiting  Weight Gain    Paclitaxel Side Effects:  Allergic Reactions  Breathing Problems  Bruising  Chills  Cough  Diarrhea  Feeling Faint  Fever  Hair Loss  Infection  Injury  Itching  Joint Pain  Low Blood Pressure  Mouth Sores  Nausea  Numbness  Pain  Rash  Swelling  Tingling  Vomiting                a.  Discussed the  risk of infection while on therapy related to pancytopenia, specifically a decrease in their white blood cell count.  Instructed to contact our office for temperature >100.4 F, chills, sudden onset cough or shortness of breath, symptoms of a urinary tract infection.                b.  Discussed the risk of anemia. Instructed to contact our office for dizziness, heart palpitations, or extreme or sudden changes in weakness.                c.  Discussed the risk of thrombocytopenia, which increases the risk of bruising or bleeding.  Instructed the patient to contact our office for spontaneous signs of bleeding, including nose bleeds, bleeding from the gums or mouth, blood in sputum, urine or stool and unusual or excessive bruising or rash.                d.  Discussed GI side effects including weight changes, changes in appetite, altered sense of taste, stomatitis, nausea, vomiting, diarrhea, constipation, and heartburn.                e.  Discussed  side effects including painful urination, changes in the amount of urination, possible urine color changes.  Discussed fertility issues and to prevent  pregnancy if of child bearing age.                f.  Discussed neurological side effects including the risk of peripheral neuropathy, either temporary or permanent.                g.  Discussed the potential for skin, hair, and nail changes.       3.  Instructed to contact our office for discussion of medication changes, the addition of vitamin and/or herbal supplementation as they may interact with some chemotherapy agents.    4.  Discussed dietary modifications and the need to maintain adequate caloric intake and proper oral hydration.  Recommended 64 ounces of fluid per day.    5.  Discussed anti-emetic protocol and bowel regimen protocol.    6.  Office contact information given including after hours number.  Discussed there is an oncologist on call 24/7, 365 days including weekends.  Provided primary nurse's  information .    7.  In summary, the patient is in agreement with the plan of care.  Questions appeared to be answered to their satisfaction. Consented the patient to the treatment plan and the patient was educated on the planned duration of the treatment and schedule of the treatment administration. Copy to be scanned into the chart.    All questions answered to the satisfaction of the patient and family.     Follow up appointments given to patient.     ALEX CORONEL  PATIENT EDUCATOR  [facility]

## 2024-10-28 ENCOUNTER — PATIENT MESSAGE (OUTPATIENT)
Dept: FAMILY MEDICINE | Facility: CLINIC | Age: 39
End: 2024-10-28
Payer: COMMERCIAL

## 2024-10-28 ENCOUNTER — INFUSION (OUTPATIENT)
Dept: INFUSION THERAPY | Facility: HOSPITAL | Age: 39
End: 2024-10-28
Attending: INTERNAL MEDICINE
Payer: COMMERCIAL

## 2024-10-28 VITALS
OXYGEN SATURATION: 100 % | TEMPERATURE: 99 F | SYSTOLIC BLOOD PRESSURE: 118 MMHG | BODY MASS INDEX: 20.2 KG/M2 | DIASTOLIC BLOOD PRESSURE: 78 MMHG | HEART RATE: 82 BPM | HEIGHT: 63 IN | WEIGHT: 114 LBS | RESPIRATION RATE: 18 BRPM

## 2024-10-28 DIAGNOSIS — C77.3 MALIGNANT NEOPLASM METASTATIC TO LYMPH NODE OF AXILLA: ICD-10-CM

## 2024-10-28 DIAGNOSIS — C50.911 INFILTRATING DUCTAL CARCINOMA OF RIGHT BREAST: ICD-10-CM

## 2024-10-28 DIAGNOSIS — Z12.31 BREAST CANCER SCREENING BY MAMMOGRAM: Primary | ICD-10-CM

## 2024-10-28 LAB
B-HCG UR QL: NEGATIVE
CTP QC/QA: YES

## 2024-10-28 PROCEDURE — 96413 CHEMO IV INFUSION 1 HR: CPT

## 2024-10-28 PROCEDURE — 63600175 PHARM REV CODE 636 W HCPCS: Mod: JG | Performed by: INTERNAL MEDICINE

## 2024-10-28 PROCEDURE — A4216 STERILE WATER/SALINE, 10 ML: HCPCS | Performed by: INTERNAL MEDICINE

## 2024-10-28 PROCEDURE — 96367 TX/PROPH/DG ADDL SEQ IV INF: CPT

## 2024-10-28 PROCEDURE — 96411 CHEMO IV PUSH ADDL DRUG: CPT

## 2024-10-28 PROCEDURE — 96375 TX/PRO/DX INJ NEW DRUG ADDON: CPT

## 2024-10-28 PROCEDURE — 81025 URINE PREGNANCY TEST: CPT | Performed by: INTERNAL MEDICINE

## 2024-10-28 PROCEDURE — 25000003 PHARM REV CODE 250: Performed by: INTERNAL MEDICINE

## 2024-10-28 RX ORDER — EPINEPHRINE 0.3 MG/.3ML
0.3 INJECTION SUBCUTANEOUS ONCE AS NEEDED
Status: DISCONTINUED | OUTPATIENT
Start: 2024-10-28 | End: 2024-10-28 | Stop reason: HOSPADM

## 2024-10-28 RX ORDER — DIPHENHYDRAMINE HYDROCHLORIDE 50 MG/ML
50 INJECTION INTRAMUSCULAR; INTRAVENOUS ONCE AS NEEDED
Status: DISCONTINUED | OUTPATIENT
Start: 2024-10-28 | End: 2024-10-28 | Stop reason: HOSPADM

## 2024-10-28 RX ORDER — HEPARIN 100 UNIT/ML
500 SYRINGE INTRAVENOUS
Status: DISCONTINUED | OUTPATIENT
Start: 2024-10-28 | End: 2024-10-28 | Stop reason: HOSPADM

## 2024-10-28 RX ORDER — SODIUM CHLORIDE 0.9 % (FLUSH) 0.9 %
10 SYRINGE (ML) INJECTION
Status: DISCONTINUED | OUTPATIENT
Start: 2024-10-28 | End: 2024-10-28 | Stop reason: HOSPADM

## 2024-10-28 RX ORDER — DOXORUBICIN HYDROCHLORIDE 2 MG/ML
60 INJECTION, SOLUTION INTRAVENOUS
Status: COMPLETED | OUTPATIENT
Start: 2024-10-28 | End: 2024-10-28

## 2024-10-28 RX ADMIN — DEXAMETHASONE SODIUM PHOSPHATE 0.25 MG: 4 INJECTION, SOLUTION INTRA-ARTICULAR; INTRALESIONAL; INTRAMUSCULAR; INTRAVENOUS; SOFT TISSUE at 01:10

## 2024-10-28 RX ADMIN — DOXORUBICIN HYDROCHLORIDE 92 MG: 2 INJECTION, SOLUTION INTRAVENOUS at 02:10

## 2024-10-28 RX ADMIN — CYCLOPHOSPHAMIDE 920 MG: 200 INJECTION, SOLUTION INTRAVENOUS at 02:10

## 2024-10-28 RX ADMIN — APREPITANT 130 MG: 130 INJECTION, EMULSION INTRAVENOUS at 01:10

## 2024-10-28 RX ADMIN — SODIUM CHLORIDE: 9 INJECTION, SOLUTION INTRAVENOUS at 01:10

## 2024-10-28 RX ADMIN — HEPARIN 500 UNITS: 100 SYRINGE at 03:10

## 2024-10-28 RX ADMIN — Medication 10 ML: at 03:10

## 2024-10-29 ENCOUNTER — INFUSION (OUTPATIENT)
Dept: INFUSION THERAPY | Facility: HOSPITAL | Age: 39
End: 2024-10-29
Attending: INTERNAL MEDICINE
Payer: COMMERCIAL

## 2024-10-29 ENCOUNTER — PATIENT MESSAGE (OUTPATIENT)
Dept: HEMATOLOGY/ONCOLOGY | Facility: CLINIC | Age: 39
End: 2024-10-29
Payer: COMMERCIAL

## 2024-10-29 VITALS
HEART RATE: 77 BPM | SYSTOLIC BLOOD PRESSURE: 106 MMHG | OXYGEN SATURATION: 100 % | TEMPERATURE: 99 F | RESPIRATION RATE: 16 BRPM | HEIGHT: 63 IN | WEIGHT: 114 LBS | BODY MASS INDEX: 20.2 KG/M2 | DIASTOLIC BLOOD PRESSURE: 71 MMHG

## 2024-10-29 DIAGNOSIS — F41.1 ANXIETY STATE: ICD-10-CM

## 2024-10-29 DIAGNOSIS — C77.3 MALIGNANT NEOPLASM METASTATIC TO LYMPH NODE OF AXILLA: Primary | ICD-10-CM

## 2024-10-29 DIAGNOSIS — C50.911 INFILTRATING DUCTAL CARCINOMA OF RIGHT BREAST: ICD-10-CM

## 2024-10-29 DIAGNOSIS — F90.0 ATTENTION DEFICIT HYPERACTIVITY DISORDER (ADHD), PREDOMINANTLY INATTENTIVE TYPE: ICD-10-CM

## 2024-10-29 PROCEDURE — 63600175 PHARM REV CODE 636 W HCPCS: Mod: JZ,JG | Performed by: INTERNAL MEDICINE

## 2024-10-29 PROCEDURE — 96372 THER/PROPH/DIAG INJ SC/IM: CPT

## 2024-10-29 RX ORDER — ALPRAZOLAM 0.5 MG/1
TABLET ORAL
Qty: 90 TABLET | Refills: 2 | Status: SHIPPED | OUTPATIENT
Start: 2024-10-29

## 2024-10-29 RX ORDER — DEXTROAMPHETAMINE SACCHARATE, AMPHETAMINE ASPARTATE, DEXTROAMPHETAMINE SULFATE AND AMPHETAMINE SULFATE 5; 5; 5; 5 MG/1; MG/1; MG/1; MG/1
1 TABLET ORAL 3 TIMES DAILY
Qty: 90 TABLET | Refills: 0 | Status: SHIPPED | OUTPATIENT
Start: 2024-10-29

## 2024-10-29 RX ADMIN — PEGFLILGRASTIM-FPGK 6 MG: 6 INJECTION, SOLUTION SUBCUTANEOUS at 03:10

## 2024-11-08 ENCOUNTER — PATIENT MESSAGE (OUTPATIENT)
Dept: HEMATOLOGY/ONCOLOGY | Facility: CLINIC | Age: 39
End: 2024-11-08

## 2024-11-08 ENCOUNTER — LAB VISIT (OUTPATIENT)
Dept: LAB | Facility: HOSPITAL | Age: 39
End: 2024-11-08
Attending: INTERNAL MEDICINE
Payer: COMMERCIAL

## 2024-11-08 ENCOUNTER — OFFICE VISIT (OUTPATIENT)
Dept: HEMATOLOGY/ONCOLOGY | Facility: CLINIC | Age: 39
End: 2024-11-08
Payer: COMMERCIAL

## 2024-11-08 VITALS
BODY MASS INDEX: 20.31 KG/M2 | HEART RATE: 73 BPM | SYSTOLIC BLOOD PRESSURE: 123 MMHG | DIASTOLIC BLOOD PRESSURE: 77 MMHG | WEIGHT: 114.63 LBS | OXYGEN SATURATION: 100 % | RESPIRATION RATE: 18 BRPM

## 2024-11-08 DIAGNOSIS — C77.3 MALIGNANT NEOPLASM METASTATIC TO LYMPH NODE OF AXILLA: Primary | ICD-10-CM

## 2024-11-08 DIAGNOSIS — C77.3 MALIGNANT NEOPLASM METASTATIC TO LYMPH NODE OF AXILLA: ICD-10-CM

## 2024-11-08 LAB
ALBUMIN SERPL-MCNC: 3.8 G/DL (ref 3.5–5)
ALBUMIN/GLOB SERPL: 1.3 RATIO (ref 1.1–2)
ALP SERPL-CCNC: 76 UNIT/L (ref 40–150)
ALT SERPL-CCNC: 26 UNIT/L (ref 0–55)
ANION GAP SERPL CALC-SCNC: 5 MEQ/L
AST SERPL-CCNC: 31 UNIT/L (ref 5–34)
B-HCG UR QL: NEGATIVE
BASOPHILS # BLD AUTO: 0.02 X10(3)/MCL
BASOPHILS NFR BLD AUTO: 0.2 %
BILIRUB SERPL-MCNC: 0.1 MG/DL
BUN SERPL-MCNC: 9.7 MG/DL (ref 7–18.7)
CALCIUM SERPL-MCNC: 9.7 MG/DL (ref 8.4–10.2)
CHLORIDE SERPL-SCNC: 108 MMOL/L (ref 98–107)
CO2 SERPL-SCNC: 28 MMOL/L (ref 22–29)
CREAT SERPL-MCNC: 0.77 MG/DL (ref 0.55–1.02)
CREAT/UREA NIT SERPL: 13
EOSINOPHIL # BLD AUTO: 0.1 X10(3)/MCL (ref 0–0.9)
EOSINOPHIL NFR BLD AUTO: 1 %
ERYTHROCYTE [DISTWIDTH] IN BLOOD BY AUTOMATED COUNT: 13 % (ref 11.5–17)
GFR SERPLBLD CREATININE-BSD FMLA CKD-EPI: >60 ML/MIN/1.73/M2
GLOBULIN SER-MCNC: 3 GM/DL (ref 2.4–3.5)
GLUCOSE SERPL-MCNC: 103 MG/DL (ref 74–100)
HCT VFR BLD AUTO: 33.9 % (ref 37–47)
HGB BLD-MCNC: 11.4 G/DL (ref 12–16)
IMM GRANULOCYTES # BLD AUTO: 1.13 X10(3)/MCL (ref 0–0.04)
IMM GRANULOCYTES NFR BLD AUTO: 11.5 %
LYMPHOCYTES # BLD AUTO: 1.93 X10(3)/MCL (ref 0.6–4.6)
LYMPHOCYTES NFR BLD AUTO: 19.6 %
MAGNESIUM SERPL-MCNC: 2.1 MG/DL (ref 1.6–2.6)
MCH RBC QN AUTO: 30.2 PG (ref 27–31)
MCHC RBC AUTO-ENTMCNC: 33.6 G/DL (ref 33–36)
MCV RBC AUTO: 89.9 FL (ref 80–94)
MONOCYTES # BLD AUTO: 0.83 X10(3)/MCL (ref 0.1–1.3)
MONOCYTES NFR BLD AUTO: 8.4 %
NEUTROPHILS # BLD AUTO: 5.83 X10(3)/MCL (ref 2.1–9.2)
NEUTROPHILS NFR BLD AUTO: 59.3 %
PLATELET # BLD AUTO: 187 X10(3)/MCL (ref 130–400)
PMV BLD AUTO: 8.7 FL (ref 7.4–10.4)
POTASSIUM SERPL-SCNC: 4.2 MMOL/L (ref 3.5–5.1)
PROT SERPL-MCNC: 6.8 GM/DL (ref 6.4–8.3)
RBC # BLD AUTO: 3.77 X10(6)/MCL (ref 4.2–5.4)
SODIUM SERPL-SCNC: 141 MMOL/L (ref 136–145)
WBC # BLD AUTO: 9.84 X10(3)/MCL (ref 4.5–11.5)

## 2024-11-08 PROCEDURE — 36415 COLL VENOUS BLD VENIPUNCTURE: CPT

## 2024-11-08 PROCEDURE — 99999 PR PBB SHADOW E&M-EST. PATIENT-LVL III: CPT | Mod: PBBFAC,,, | Performed by: INTERNAL MEDICINE

## 2024-11-08 PROCEDURE — 83735 ASSAY OF MAGNESIUM: CPT

## 2024-11-08 PROCEDURE — 80053 COMPREHEN METABOLIC PANEL: CPT

## 2024-11-08 PROCEDURE — 81025 URINE PREGNANCY TEST: CPT

## 2024-11-08 PROCEDURE — 85025 COMPLETE CBC W/AUTO DIFF WBC: CPT

## 2024-11-08 RX ORDER — DIPHENHYDRAMINE HYDROCHLORIDE 50 MG/ML
50 INJECTION INTRAMUSCULAR; INTRAVENOUS ONCE AS NEEDED
OUTPATIENT
Start: 2024-11-11

## 2024-11-08 RX ORDER — DOXORUBICIN HYDROCHLORIDE 2 MG/ML
60 INJECTION, SOLUTION INTRAVENOUS
OUTPATIENT
Start: 2024-11-11

## 2024-11-08 RX ORDER — HEPARIN 100 UNIT/ML
500 SYRINGE INTRAVENOUS
OUTPATIENT
Start: 2024-11-11

## 2024-11-08 RX ORDER — EPINEPHRINE 0.3 MG/.3ML
0.3 INJECTION SUBCUTANEOUS ONCE AS NEEDED
OUTPATIENT
Start: 2024-11-11

## 2024-11-08 RX ORDER — SODIUM CHLORIDE 0.9 % (FLUSH) 0.9 %
10 SYRINGE (ML) INJECTION
OUTPATIENT
Start: 2024-11-11

## 2024-11-08 RX ORDER — AMITRIPTYLINE HYDROCHLORIDE 10 MG/1
TABLET, FILM COATED ORAL
COMMUNITY
Start: 2024-09-27

## 2024-11-08 NOTE — PROGRESS NOTES
Subjective:       Patient ID: Joo Haywood is a 39 y.o. female.    Chief Complaint: Follow-up (Pt has no concerns today)      Diagnosis:  pT2, N1a, M0 stage II invasive ductal carcinoma, grade 3, ER 89.9% positive, CT 32.6% positive, HER2 Tyshawn negative by IHC (0) with a Ki-67 of 81.8%.    Current Treatment:   Adjuvant chemotherapy with dose dense AC followed by weekly Taxol.  Started on 10/28/2024.  Patient will need adjuvant radiation due to lymph node involvement and lumpectomy.  Patient will need adjuvant endocrine therapy, due to bonilla involvement would recommend ovarian suppression and aromatase inhibitor for 10 years    Treatment History:  Right-sided lumpectomy and sentinel lymph node on 08/09/2024    HPI:  39-year-old female who palpated a breast mass in January 2024.  After undergoing some insurance changes, she underwent  screening digital mammogram with tomosynthesis on 06/13/2024 at [facility] Center [facility].  This showed a subtle 2.0 cm round focal asymmetry in the right breast at the 12 o'clock position.  This corresponded to a lump that the patient had palpated.  This was BI-RADS 0, needs additional assessment.  Patient then underwent bilateral breast ultrasound on 06/26/2024, this revealed a mostly round hypoechoic mass with a microlobulated margin and heterogeneous texture located in the right breast at the 11 o'clock position 4 cm from the nipple measuring 2.1 x 1.8 x 2.2 cm.  This was suspicious for malignancy.  There were no other masses or other findings that were suspicious.  The right axilla showed nonspecific benign-appearing lymph nodes without evidence of malignancy.  This was read as BI-RADS 4, biopsy recommended.  Patient underwent a biopsy on 07/01/2024, pathology revealed invasive ductal carcinoma, grade 3.  Breast panel showed ER 89.9% positive, CT 32.6% positive, HER2 negative by IHC (0) with a Ki-67 of 81.8%.  The patient was then seen by Dr. Candelario Amin on 07/11/2024,  plan was for right-sided lumpectomy and sentinel lymph node.  She had a breast MRI on 07/18/2024 that showed a 2.8 x 2.4 x 2.3 cm round enhancing mass with microlobulated and spiculated margins located in the right breast at the 12 o'clock position 4 cm from the nipple, this was consistent with patient's biopsy-proven invasive ductal carcinoma.  There were no lymph nodes in the right axilla or in the internal mammary chain that were suspicious.  The patient then underwent a right breast lumpectomy with sentinel lymph node on 08/09/2024.  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  Superior margin reexcision was done and showed benign tissue.  There were 8 lymph nodes removed, 1 was positive for metastatic invasive ductal carcinoma.  Final pathologic stage was pT2, N1a, M0 stage II.  I initially saw the patient on 09/04/2024. Oncotype DX returned with a score of 44, greater than 12% chance of distant recurrence at 9 years with an AI or tamoxifen alone, and chemotherapy benefit.     CT chest, abdomen, and pelvis done on 10/02/2024 showed no evidence of metastatic disease in the chest, abdomen, or pelvis.    Echocardiogram done on 10/10/2024 showed an EF of 60-65%.    Adjuvant chemotherapy with dose dense AC started on 10/28/2024.     Interval History:   Patient presents to clinic for scheduled follow up appointment and treatment visit.  She tolerated cycle 1 well.  She has not started losing her hair yet.  She was due for cycle 2 on Monday.  Otherwise, her only issue with treatment was a little bit of fatigue and shakiness on the 3rd day.      Past Medical History:   Diagnosis Date    ADHD (attention deficit hyperactivity disorder)     Migraine       Past Surgical History:   Procedure Laterality Date    CERVICAL CONIZATION   W/ LASER  2013    FESS, USING COMPUTER-ASSISTED NAVIGATION, WITH NASAL TURBINATE REDUCTION  2021    Dr. Stevens    FUNCTIONAL ENDOSCOPIC SINUS SURGERY (FESS)       Social  History     Socioeconomic History    Marital status: Significant Other   Tobacco Use    Smoking status: Never     Passive exposure: Never    Smokeless tobacco: Never   Substance and Sexual Activity    Alcohol use: Not Currently    Drug use: Never    Sexual activity: Yes     Partners: Male     Birth control/protection: OCP     Social Drivers of Health     Financial Resource Strain: Medium Risk (2/6/2024)    Overall Financial Resource Strain (CARDIA)     Difficulty of Paying Living Expenses: Somewhat hard   Food Insecurity: Food Insecurity Present (2/6/2024)    Hunger Vital Sign     Worried About Running Out of Food in the Last Year: Sometimes true     Ran Out of Food in the Last Year: Never true   Transportation Needs: No Transportation Needs (2/6/2024)    PRAPARE - Transportation     Lack of Transportation (Medical): No     Lack of Transportation (Non-Medical): No   Physical Activity: Sufficiently Active (2/6/2024)    Exercise Vital Sign     Days of Exercise per Week: 3 days     Minutes of Exercise per Session: 150+ min   Recent Concern: Physical Activity - Inactive (12/5/2023)    Exercise Vital Sign     Days of Exercise per Week: 0 days     Minutes of Exercise per Session: 0 min   Stress: Stress Concern Present (2/6/2024)    [employer] [place] [employer] - Occupational Stress Questionnaire     Feeling of Stress : Very much   Housing Stability: Low Risk  (2/6/2024)    Housing Stability Vital Sign     Unable to Pay for Housing in the Last Year: No     Number of Places Lived in the Last Year: 1     Unstable Housing in the Last Year: No      Family History   Problem Relation Name Age of Onset    Ulcerative colitis Mother      Heart disease Father Anthony Clemente     Alcohol abuse Paternal Grandmother Briana clemente       Review of patient's allergies indicates:  No Known Allergies   Review of Systems   Constitutional:  Negative for appetite change and unexpected weight change.   HENT:  Negative for mouth sores.     Eyes:  Negative for visual disturbance.   Respiratory:  Negative for cough and shortness of breath.    Cardiovascular:  Negative for chest pain.   Gastrointestinal:  Negative for abdominal pain and diarrhea.   Genitourinary:  Negative for frequency.   Musculoskeletal:  Negative for back pain.   Integumentary:  Negative for rash.   Neurological:  Positive for headaches.   Hematological:  Negative for adenopathy.   Psychiatric/Behavioral:  The patient is nervous/anxious.          Objective:      Physical Exam  Vitals reviewed. Exam conducted with a chaperone present.   Constitutional:       General: She is not in acute distress.     Appearance: Normal appearance.   HENT:      Head: Normocephalic and atraumatic.      Nose: Nose normal.      Mouth/Throat:      Mouth: Mucous membranes are moist.   Eyes:      Extraocular Movements: Extraocular movements intact.      Conjunctiva/sclera: Conjunctivae normal.   Cardiovascular:      Rate and Rhythm: Normal rate and regular rhythm.      Pulses: Normal pulses.      Heart sounds: Normal heart sounds.   Pulmonary:      Effort: Pulmonary effort is normal.      Breath sounds: Normal breath sounds.   Abdominal:      General: Bowel sounds are normal.      Palpations: Abdomen is soft.   Musculoskeletal:         General: No swelling. Normal range of motion.      Cervical back: Normal range of motion and neck supple.      Right lower leg: No edema.      Left lower leg: No edema.   Lymphadenopathy:      Cervical: No cervical adenopathy.   Skin:     General: Skin is warm and dry.   Neurological:      General: No focal deficit present.      Mental Status: She is alert and oriented to person, place, and time. Mental status is at baseline.   Psychiatric:         Mood and Affect: Mood normal.         Behavior: Behavior normal.         LABS AND IMAGING REVIEWED IN EPIC          Assessment:   pT2, N1a, M0 stage II invasive ductal carcinoma, grade 3, ER 89.9% positive, IN 32.6% positive, HER2  Tyshawn negative by IHC (0) with a Ki-67 of 81.8%.        Plan:       We had a long conversation about the overall good prognosis.  We discussed Oncotype DX results.  Goal of treatment is cure.    I recommended dose dense AC followed by weekly Taxol.    She will then need adjuvant radiation.    I explained that due to her hormone receptor positivity, she would need adjuvant endocrine therapy. I would recommend 10 years of therapy due to bonilla involvement. I also discussed the possibility of ovarian suppression + aromatase inhibitor. She did inquire about having a tubal ligation and ablation, but now her plan is to have a bilateral oophorectomy in July 2025.    Discussed fertility, patient is done with child bearing.    Radiation oncology, . Prellop    CT chest, abdomen, and pelvis done on 10/02/2024 showed no evidence of disease.    Echocardiogram done on 10/10/2024 showed an EF of 60-65%.    Started AC in a dose dense fashion every 2 weeks on 10/28/2024.    Okay to proceed with cycle 2 on Monday    Labs on 11/27/2024 and treat on 12/02/2024 for cycle 3    She will return to clinic prior to cycle 4 with labs     Parish Barton II, MD I, Cindy Perez LPN, acted solely as a scribe for and in the presence of, Dr. Parish Barton who performed the service.

## 2024-11-11 ENCOUNTER — INFUSION (OUTPATIENT)
Dept: INFUSION THERAPY | Facility: HOSPITAL | Age: 39
End: 2024-11-11
Attending: INTERNAL MEDICINE
Payer: COMMERCIAL

## 2024-11-11 VITALS
SYSTOLIC BLOOD PRESSURE: 112 MMHG | HEIGHT: 63 IN | BODY MASS INDEX: 20.31 KG/M2 | WEIGHT: 114.63 LBS | OXYGEN SATURATION: 98 % | DIASTOLIC BLOOD PRESSURE: 75 MMHG | TEMPERATURE: 99 F | HEART RATE: 78 BPM | RESPIRATION RATE: 18 BRPM

## 2024-11-11 DIAGNOSIS — C50.911 INFILTRATING DUCTAL CARCINOMA OF RIGHT BREAST: ICD-10-CM

## 2024-11-11 DIAGNOSIS — C77.3 MALIGNANT NEOPLASM METASTATIC TO LYMPH NODE OF AXILLA: Primary | ICD-10-CM

## 2024-11-11 PROCEDURE — 63600175 PHARM REV CODE 636 W HCPCS: Performed by: INTERNAL MEDICINE

## 2024-11-11 PROCEDURE — 96413 CHEMO IV INFUSION 1 HR: CPT

## 2024-11-11 PROCEDURE — 25000003 PHARM REV CODE 250: Performed by: INTERNAL MEDICINE

## 2024-11-11 PROCEDURE — 96411 CHEMO IV PUSH ADDL DRUG: CPT

## 2024-11-11 PROCEDURE — 96367 TX/PROPH/DG ADDL SEQ IV INF: CPT

## 2024-11-11 PROCEDURE — A4216 STERILE WATER/SALINE, 10 ML: HCPCS | Performed by: INTERNAL MEDICINE

## 2024-11-11 PROCEDURE — 96375 TX/PRO/DX INJ NEW DRUG ADDON: CPT

## 2024-11-11 RX ORDER — EPINEPHRINE 0.3 MG/.3ML
0.3 INJECTION SUBCUTANEOUS ONCE AS NEEDED
Status: DISCONTINUED | OUTPATIENT
Start: 2024-11-11 | End: 2024-11-11 | Stop reason: HOSPADM

## 2024-11-11 RX ORDER — DOXORUBICIN HYDROCHLORIDE 2 MG/ML
60 INJECTION, SOLUTION INTRAVENOUS
Status: COMPLETED | OUTPATIENT
Start: 2024-11-11 | End: 2024-11-11

## 2024-11-11 RX ORDER — HEPARIN 100 UNIT/ML
500 SYRINGE INTRAVENOUS
Status: DISCONTINUED | OUTPATIENT
Start: 2024-11-11 | End: 2024-11-11 | Stop reason: HOSPADM

## 2024-11-11 RX ORDER — SODIUM CHLORIDE 0.9 % (FLUSH) 0.9 %
10 SYRINGE (ML) INJECTION
Status: DISCONTINUED | OUTPATIENT
Start: 2024-11-11 | End: 2024-11-11 | Stop reason: HOSPADM

## 2024-11-11 RX ORDER — DIPHENHYDRAMINE HYDROCHLORIDE 50 MG/ML
50 INJECTION INTRAMUSCULAR; INTRAVENOUS ONCE AS NEEDED
Status: DISCONTINUED | OUTPATIENT
Start: 2024-11-11 | End: 2024-11-11 | Stop reason: HOSPADM

## 2024-11-11 RX ADMIN — APREPITANT 130 MG: 130 INJECTION, EMULSION INTRAVENOUS at 02:11

## 2024-11-11 RX ADMIN — SODIUM CHLORIDE 0.25 MG: 9 INJECTION, SOLUTION INTRAVENOUS at 02:11

## 2024-11-11 RX ADMIN — CYCLOPHOSPHAMIDE 920 MG: 200 INJECTION, SOLUTION INTRAVENOUS at 03:11

## 2024-11-11 RX ADMIN — Medication 10 ML: at 03:11

## 2024-11-11 RX ADMIN — HEPARIN 500 UNITS: 100 SYRINGE at 03:11

## 2024-11-11 RX ADMIN — DOXORUBICIN HYDROCHLORIDE 92 MG: 2 INJECTION, SOLUTION INTRAVENOUS at 02:11

## 2024-11-11 RX ADMIN — SODIUM CHLORIDE: 9 INJECTION, SOLUTION INTRAVENOUS at 02:11

## 2024-11-11 NOTE — PLAN OF CARE
Plan of care reviewed with the patient. C2 D1 AC given, tolerated well. Discharged in stable condition and is aware of future appointments.

## 2024-11-12 ENCOUNTER — INFUSION (OUTPATIENT)
Dept: INFUSION THERAPY | Facility: HOSPITAL | Age: 39
End: 2024-11-12
Attending: INTERNAL MEDICINE
Payer: COMMERCIAL

## 2024-11-12 VITALS
TEMPERATURE: 98 F | RESPIRATION RATE: 16 BRPM | SYSTOLIC BLOOD PRESSURE: 105 MMHG | BODY MASS INDEX: 21.02 KG/M2 | HEIGHT: 63 IN | WEIGHT: 118.63 LBS | HEART RATE: 80 BPM | OXYGEN SATURATION: 100 % | DIASTOLIC BLOOD PRESSURE: 65 MMHG

## 2024-11-12 DIAGNOSIS — C77.3 MALIGNANT NEOPLASM METASTATIC TO LYMPH NODE OF AXILLA: Primary | ICD-10-CM

## 2024-11-12 DIAGNOSIS — C50.911 INFILTRATING DUCTAL CARCINOMA OF RIGHT BREAST: ICD-10-CM

## 2024-11-12 PROCEDURE — 63600175 PHARM REV CODE 636 W HCPCS: Mod: JZ,JG | Performed by: INTERNAL MEDICINE

## 2024-11-12 PROCEDURE — 96372 THER/PROPH/DIAG INJ SC/IM: CPT

## 2024-11-12 RX ADMIN — PEGFLILGRASTIM-FPGK 6 MG: 6 INJECTION, SOLUTION SUBCUTANEOUS at 03:11

## 2024-11-25 DIAGNOSIS — F90.0 ATTENTION DEFICIT HYPERACTIVITY DISORDER (ADHD), PREDOMINANTLY INATTENTIVE TYPE: ICD-10-CM

## 2024-11-26 RX ORDER — DEXTROAMPHETAMINE SACCHARATE, AMPHETAMINE ASPARTATE, DEXTROAMPHETAMINE SULFATE AND AMPHETAMINE SULFATE 5; 5; 5; 5 MG/1; MG/1; MG/1; MG/1
1 TABLET ORAL 3 TIMES DAILY
Qty: 90 TABLET | Refills: 0 | Status: SHIPPED | OUTPATIENT
Start: 2024-11-26

## 2024-11-27 ENCOUNTER — LAB VISIT (OUTPATIENT)
Dept: LAB | Facility: HOSPITAL | Age: 39
End: 2024-11-27
Attending: INTERNAL MEDICINE
Payer: COMMERCIAL

## 2024-11-27 DIAGNOSIS — C77.3 MALIGNANT NEOPLASM METASTATIC TO LYMPH NODE OF AXILLA: ICD-10-CM

## 2024-11-27 LAB
ALBUMIN SERPL-MCNC: 3.9 G/DL (ref 3.5–5)
ALBUMIN/GLOB SERPL: 1.3 RATIO (ref 1.1–2)
ALP SERPL-CCNC: 71 UNIT/L (ref 40–150)
ALT SERPL-CCNC: 22 UNIT/L (ref 0–55)
ANION GAP SERPL CALC-SCNC: 8 MEQ/L
AST SERPL-CCNC: 31 UNIT/L (ref 5–34)
B-HCG UR QL: NEGATIVE
BASOPHILS # BLD AUTO: 0.05 X10(3)/MCL
BASOPHILS NFR BLD AUTO: 0.8 %
BILIRUB SERPL-MCNC: 0.3 MG/DL
BUN SERPL-MCNC: 11.8 MG/DL (ref 7–18.7)
CALCIUM SERPL-MCNC: 9.5 MG/DL (ref 8.4–10.2)
CHLORIDE SERPL-SCNC: 107 MMOL/L (ref 98–107)
CO2 SERPL-SCNC: 24 MMOL/L (ref 22–29)
CREAT SERPL-MCNC: 0.76 MG/DL (ref 0.55–1.02)
CREAT/UREA NIT SERPL: 16
EOSINOPHIL # BLD AUTO: 0.08 X10(3)/MCL (ref 0–0.9)
EOSINOPHIL NFR BLD AUTO: 1.3 %
ERYTHROCYTE [DISTWIDTH] IN BLOOD BY AUTOMATED COUNT: 14.1 % (ref 11.5–17)
GFR SERPLBLD CREATININE-BSD FMLA CKD-EPI: >60 ML/MIN/1.73/M2
GLOBULIN SER-MCNC: 3 GM/DL (ref 2.4–3.5)
GLUCOSE SERPL-MCNC: 89 MG/DL (ref 74–100)
HCT VFR BLD AUTO: 34 % (ref 37–47)
HGB BLD-MCNC: 11.6 G/DL (ref 12–16)
IMM GRANULOCYTES # BLD AUTO: 0.11 X10(3)/MCL (ref 0–0.04)
IMM GRANULOCYTES NFR BLD AUTO: 1.7 %
LYMPHOCYTES # BLD AUTO: 1.59 X10(3)/MCL (ref 0.6–4.6)
LYMPHOCYTES NFR BLD AUTO: 25.2 %
MAGNESIUM SERPL-MCNC: 2.1 MG/DL (ref 1.6–2.6)
MCH RBC QN AUTO: 30.4 PG (ref 27–31)
MCHC RBC AUTO-ENTMCNC: 34.1 G/DL (ref 33–36)
MCV RBC AUTO: 89 FL (ref 80–94)
MONOCYTES # BLD AUTO: 0.78 X10(3)/MCL (ref 0.1–1.3)
MONOCYTES NFR BLD AUTO: 12.4 %
NEUTROPHILS # BLD AUTO: 3.7 X10(3)/MCL (ref 2.1–9.2)
NEUTROPHILS NFR BLD AUTO: 58.6 %
PLATELET # BLD AUTO: 202 X10(3)/MCL (ref 130–400)
PMV BLD AUTO: 8.4 FL (ref 7.4–10.4)
POTASSIUM SERPL-SCNC: 4 MMOL/L (ref 3.5–5.1)
PROT SERPL-MCNC: 6.9 GM/DL (ref 6.4–8.3)
RBC # BLD AUTO: 3.82 X10(6)/MCL (ref 4.2–5.4)
SODIUM SERPL-SCNC: 139 MMOL/L (ref 136–145)
WBC # BLD AUTO: 6.31 X10(3)/MCL (ref 4.5–11.5)

## 2024-11-27 PROCEDURE — 81025 URINE PREGNANCY TEST: CPT

## 2024-11-27 PROCEDURE — 36415 COLL VENOUS BLD VENIPUNCTURE: CPT

## 2024-11-27 PROCEDURE — 80053 COMPREHEN METABOLIC PANEL: CPT

## 2024-11-27 PROCEDURE — 85025 COMPLETE CBC W/AUTO DIFF WBC: CPT

## 2024-11-27 PROCEDURE — 83735 ASSAY OF MAGNESIUM: CPT

## 2024-11-29 RX ORDER — HEPARIN 100 UNIT/ML
500 SYRINGE INTRAVENOUS
OUTPATIENT
Start: 2024-12-02

## 2024-11-29 RX ORDER — EPINEPHRINE 0.3 MG/.3ML
0.3 INJECTION SUBCUTANEOUS ONCE AS NEEDED
OUTPATIENT
Start: 2024-12-02

## 2024-11-29 RX ORDER — DIPHENHYDRAMINE HYDROCHLORIDE 50 MG/ML
50 INJECTION INTRAMUSCULAR; INTRAVENOUS ONCE AS NEEDED
OUTPATIENT
Start: 2024-12-02

## 2024-11-29 RX ORDER — DOXORUBICIN HYDROCHLORIDE 2 MG/ML
60 INJECTION, SOLUTION INTRAVENOUS
OUTPATIENT
Start: 2024-12-02

## 2024-11-29 RX ORDER — SODIUM CHLORIDE 0.9 % (FLUSH) 0.9 %
10 SYRINGE (ML) INJECTION
OUTPATIENT
Start: 2024-12-02

## 2024-12-02 ENCOUNTER — INFUSION (OUTPATIENT)
Dept: INFUSION THERAPY | Facility: HOSPITAL | Age: 39
End: 2024-12-02
Attending: INTERNAL MEDICINE
Payer: COMMERCIAL

## 2024-12-02 VITALS
WEIGHT: 117.5 LBS | HEART RATE: 87 BPM | OXYGEN SATURATION: 100 % | DIASTOLIC BLOOD PRESSURE: 68 MMHG | HEIGHT: 63 IN | SYSTOLIC BLOOD PRESSURE: 107 MMHG | RESPIRATION RATE: 18 BRPM | BODY MASS INDEX: 20.82 KG/M2 | TEMPERATURE: 98 F

## 2024-12-02 DIAGNOSIS — C77.3 MALIGNANT NEOPLASM METASTATIC TO LYMPH NODE OF AXILLA: Primary | ICD-10-CM

## 2024-12-02 DIAGNOSIS — C50.911 INFILTRATING DUCTAL CARCINOMA OF RIGHT BREAST: ICD-10-CM

## 2024-12-02 PROCEDURE — A4216 STERILE WATER/SALINE, 10 ML: HCPCS | Performed by: INTERNAL MEDICINE

## 2024-12-02 PROCEDURE — 63600175 PHARM REV CODE 636 W HCPCS: Mod: JG | Performed by: INTERNAL MEDICINE

## 2024-12-02 PROCEDURE — 96411 CHEMO IV PUSH ADDL DRUG: CPT

## 2024-12-02 PROCEDURE — 96413 CHEMO IV INFUSION 1 HR: CPT

## 2024-12-02 PROCEDURE — 25000003 PHARM REV CODE 250: Performed by: INTERNAL MEDICINE

## 2024-12-02 PROCEDURE — 96367 TX/PROPH/DG ADDL SEQ IV INF: CPT

## 2024-12-02 PROCEDURE — 96375 TX/PRO/DX INJ NEW DRUG ADDON: CPT

## 2024-12-02 RX ORDER — SODIUM CHLORIDE 0.9 % (FLUSH) 0.9 %
10 SYRINGE (ML) INJECTION
Status: DISCONTINUED | OUTPATIENT
Start: 2024-12-02 | End: 2024-12-02 | Stop reason: HOSPADM

## 2024-12-02 RX ORDER — HEPARIN 100 UNIT/ML
500 SYRINGE INTRAVENOUS
Status: DISCONTINUED | OUTPATIENT
Start: 2024-12-02 | End: 2024-12-02 | Stop reason: HOSPADM

## 2024-12-02 RX ORDER — DOXORUBICIN HYDROCHLORIDE 2 MG/ML
60 INJECTION, SOLUTION INTRAVENOUS
Status: COMPLETED | OUTPATIENT
Start: 2024-12-02 | End: 2024-12-02

## 2024-12-02 RX ORDER — EPINEPHRINE 0.3 MG/.3ML
0.3 INJECTION SUBCUTANEOUS ONCE AS NEEDED
Status: DISCONTINUED | OUTPATIENT
Start: 2024-12-02 | End: 2024-12-02 | Stop reason: HOSPADM

## 2024-12-02 RX ORDER — DIPHENHYDRAMINE HYDROCHLORIDE 50 MG/ML
50 INJECTION INTRAMUSCULAR; INTRAVENOUS ONCE AS NEEDED
Status: DISCONTINUED | OUTPATIENT
Start: 2024-12-02 | End: 2024-12-02 | Stop reason: HOSPADM

## 2024-12-02 RX ADMIN — DOXORUBICIN HYDROCHLORIDE 92 MG: 2 INJECTION, SOLUTION INTRAVENOUS at 02:12

## 2024-12-02 RX ADMIN — SODIUM CHLORIDE: 9 INJECTION, SOLUTION INTRAVENOUS at 02:12

## 2024-12-02 RX ADMIN — SODIUM CHLORIDE 0.25 MG: 9 INJECTION, SOLUTION INTRAVENOUS at 02:12

## 2024-12-02 RX ADMIN — HEPARIN 500 UNITS: 100 SYRINGE at 03:12

## 2024-12-02 RX ADMIN — CYCLOPHOSPHAMIDE 920 MG: 1 INJECTION INTRAVENOUS at 03:12

## 2024-12-02 RX ADMIN — APREPITANT 130 MG: 130 INJECTION, EMULSION INTRAVENOUS at 02:12

## 2024-12-02 RX ADMIN — Medication 10 ML: at 03:12

## 2024-12-02 NOTE — PLAN OF CARE
C3 D1 AC given. Tolerated well. Next appts confirmed with pt. Stated uses portal to view next appts. Dc'd home in stable condition.

## 2024-12-04 ENCOUNTER — PATIENT MESSAGE (OUTPATIENT)
Dept: HEMATOLOGY/ONCOLOGY | Facility: CLINIC | Age: 39
End: 2024-12-04
Payer: COMMERCIAL

## 2024-12-04 ENCOUNTER — PATIENT MESSAGE (OUTPATIENT)
Dept: FAMILY MEDICINE | Facility: CLINIC | Age: 39
End: 2024-12-04
Payer: COMMERCIAL

## 2024-12-04 ENCOUNTER — INFUSION (OUTPATIENT)
Dept: INFUSION THERAPY | Facility: HOSPITAL | Age: 39
End: 2024-12-04
Attending: INTERNAL MEDICINE
Payer: COMMERCIAL

## 2024-12-04 VITALS — SYSTOLIC BLOOD PRESSURE: 118 MMHG | DIASTOLIC BLOOD PRESSURE: 80 MMHG | TEMPERATURE: 98 F | HEART RATE: 84 BPM

## 2024-12-04 DIAGNOSIS — C50.911 INFILTRATING DUCTAL CARCINOMA OF RIGHT BREAST: ICD-10-CM

## 2024-12-04 DIAGNOSIS — C77.3 MALIGNANT NEOPLASM METASTATIC TO LYMPH NODE OF AXILLA: Primary | ICD-10-CM

## 2024-12-04 PROCEDURE — 96372 THER/PROPH/DIAG INJ SC/IM: CPT

## 2024-12-04 PROCEDURE — 63600175 PHARM REV CODE 636 W HCPCS: Mod: JZ,JG | Performed by: INTERNAL MEDICINE

## 2024-12-04 RX ADMIN — PEGFLILGRASTIM-FPGK 6 MG: 6 INJECTION, SOLUTION SUBCUTANEOUS at 08:12

## 2024-12-04 NOTE — NURSING
Pt here for stimufend injection, received cycle 3 AC on Monday.  Reports increased numbness in left middle finger and tingling in left ring and index fingers.  Dr Barton and Tamica Cardoso, [facility] notified.  Recommended otc wrist splint, call if gets worse or becomes painful.  Verb understanding.

## 2024-12-11 ENCOUNTER — OFFICE VISIT (OUTPATIENT)
Dept: HEMATOLOGY/ONCOLOGY | Facility: CLINIC | Age: 39
End: 2024-12-11
Payer: COMMERCIAL

## 2024-12-11 ENCOUNTER — OFFICE VISIT (OUTPATIENT)
Dept: FAMILY MEDICINE | Facility: CLINIC | Age: 39
End: 2024-12-11
Payer: COMMERCIAL

## 2024-12-11 ENCOUNTER — LAB VISIT (OUTPATIENT)
Dept: LAB | Facility: HOSPITAL | Age: 39
End: 2024-12-11
Attending: INTERNAL MEDICINE
Payer: COMMERCIAL

## 2024-12-11 VITALS
BODY MASS INDEX: 20.38 KG/M2 | TEMPERATURE: 98 F | HEIGHT: 63 IN | DIASTOLIC BLOOD PRESSURE: 82 MMHG | SYSTOLIC BLOOD PRESSURE: 120 MMHG | RESPIRATION RATE: 16 BRPM | HEART RATE: 81 BPM | WEIGHT: 115 LBS | OXYGEN SATURATION: 100 %

## 2024-12-11 VITALS
OXYGEN SATURATION: 99 % | HEIGHT: 63 IN | SYSTOLIC BLOOD PRESSURE: 106 MMHG | BODY MASS INDEX: 20.2 KG/M2 | WEIGHT: 114 LBS | HEART RATE: 77 BPM | RESPIRATION RATE: 18 BRPM | DIASTOLIC BLOOD PRESSURE: 72 MMHG

## 2024-12-11 DIAGNOSIS — Z23 NEED FOR PNEUMOCOCCAL VACCINATION: ICD-10-CM

## 2024-12-11 DIAGNOSIS — C77.3 MALIGNANT NEOPLASM METASTATIC TO LYMPH NODE OF AXILLA: ICD-10-CM

## 2024-12-11 DIAGNOSIS — F90.0 ATTENTION DEFICIT HYPERACTIVITY DISORDER (ADHD), PREDOMINANTLY INATTENTIVE TYPE: ICD-10-CM

## 2024-12-11 DIAGNOSIS — C50.911 INFILTRATING DUCTAL CARCINOMA OF RIGHT BREAST: Primary | ICD-10-CM

## 2024-12-11 DIAGNOSIS — F41.1 ANXIETY STATE: ICD-10-CM

## 2024-12-11 DIAGNOSIS — Z79.899 ON ANTINEOPLASTIC CHEMOTHERAPY: ICD-10-CM

## 2024-12-11 DIAGNOSIS — K59.00 CONSTIPATION, UNSPECIFIED CONSTIPATION TYPE: ICD-10-CM

## 2024-12-11 DIAGNOSIS — R73.03 PREDIABETES: ICD-10-CM

## 2024-12-11 DIAGNOSIS — Z23 NEED FOR INFLUENZA VACCINATION: ICD-10-CM

## 2024-12-11 DIAGNOSIS — K21.9 GASTROESOPHAGEAL REFLUX DISEASE, UNSPECIFIED WHETHER ESOPHAGITIS PRESENT: ICD-10-CM

## 2024-12-11 DIAGNOSIS — C50.911 INFILTRATING DUCTAL CARCINOMA OF RIGHT BREAST: ICD-10-CM

## 2024-12-11 DIAGNOSIS — G43.109 MIGRAINE WITH AURA AND WITHOUT STATUS MIGRAINOSUS, NOT INTRACTABLE: ICD-10-CM

## 2024-12-11 DIAGNOSIS — Z00.00 ANNUAL PHYSICAL EXAM: ICD-10-CM

## 2024-12-11 DIAGNOSIS — G47.00 INSOMNIA, UNSPECIFIED TYPE: ICD-10-CM

## 2024-12-11 DIAGNOSIS — F32.A DEPRESSION, UNSPECIFIED DEPRESSION TYPE: ICD-10-CM

## 2024-12-11 DIAGNOSIS — Z00.00 ANNUAL PHYSICAL EXAM: Primary | ICD-10-CM

## 2024-12-11 DIAGNOSIS — B00.1 HERPES LABIALIS: ICD-10-CM

## 2024-12-11 LAB
ALBUMIN SERPL-MCNC: 3.6 G/DL (ref 3.5–5)
ALBUMIN/GLOB SERPL: 1.1 RATIO (ref 1.1–2)
ALP SERPL-CCNC: 76 UNIT/L (ref 40–150)
ALT SERPL-CCNC: 21 UNIT/L (ref 0–55)
ANION GAP SERPL CALC-SCNC: 6 MEQ/L
AST SERPL-CCNC: 25 UNIT/L (ref 5–34)
B-HCG UR QL: NEGATIVE
BASOPHILS # BLD AUTO: 0.05 X10(3)/MCL
BASOPHILS NFR BLD AUTO: 1.4 %
BILIRUB SERPL-MCNC: 0.2 MG/DL
BUN SERPL-MCNC: 10.8 MG/DL (ref 7–18.7)
CALCIUM SERPL-MCNC: 9.3 MG/DL (ref 8.4–10.2)
CHLORIDE SERPL-SCNC: 106 MMOL/L (ref 98–107)
CHOLEST SERPL-MCNC: 163 MG/DL
CHOLEST/HDLC SERPL: 3 {RATIO} (ref 0–5)
CO2 SERPL-SCNC: 29 MMOL/L (ref 22–29)
CREAT SERPL-MCNC: 0.74 MG/DL (ref 0.55–1.02)
CREAT/UREA NIT SERPL: 15
EOSINOPHIL # BLD AUTO: 0.16 X10(3)/MCL (ref 0–0.9)
EOSINOPHIL NFR BLD AUTO: 4.6 %
ERYTHROCYTE [DISTWIDTH] IN BLOOD BY AUTOMATED COUNT: 14.2 % (ref 11.5–17)
EST. AVERAGE GLUCOSE BLD GHB EST-MCNC: 116.9 MG/DL
GFR SERPLBLD CREATININE-BSD FMLA CKD-EPI: >60 ML/MIN/1.73/M2
GLOBULIN SER-MCNC: 3.2 GM/DL (ref 2.4–3.5)
GLUCOSE SERPL-MCNC: 92 MG/DL (ref 74–100)
HBA1C MFR BLD: 5.7 %
HCT VFR BLD AUTO: 31.7 % (ref 37–47)
HDLC SERPL-MCNC: 56 MG/DL (ref 35–60)
HGB BLD-MCNC: 10.7 G/DL (ref 12–16)
IMM GRANULOCYTES # BLD AUTO: 0.06 X10(3)/MCL (ref 0–0.04)
IMM GRANULOCYTES NFR BLD AUTO: 1.7 %
LDLC SERPL CALC-MCNC: 96 MG/DL (ref 50–140)
LYMPHOCYTES # BLD AUTO: 0.9 X10(3)/MCL (ref 0.6–4.6)
LYMPHOCYTES NFR BLD AUTO: 26 %
MAGNESIUM SERPL-MCNC: 2 MG/DL (ref 1.6–2.6)
MCH RBC QN AUTO: 30.7 PG (ref 27–31)
MCHC RBC AUTO-ENTMCNC: 33.8 G/DL (ref 33–36)
MCV RBC AUTO: 90.8 FL (ref 80–94)
MONOCYTES # BLD AUTO: 0.59 X10(3)/MCL (ref 0.1–1.3)
MONOCYTES NFR BLD AUTO: 17.1 %
NEUTROPHILS # BLD AUTO: 1.7 X10(3)/MCL (ref 2.1–9.2)
NEUTROPHILS NFR BLD AUTO: 49.2 %
PLATELET # BLD AUTO: 143 X10(3)/MCL (ref 130–400)
PMV BLD AUTO: 8.5 FL (ref 7.4–10.4)
POTASSIUM SERPL-SCNC: 4 MMOL/L (ref 3.5–5.1)
PROT SERPL-MCNC: 6.8 GM/DL (ref 6.4–8.3)
RBC # BLD AUTO: 3.49 X10(6)/MCL (ref 4.2–5.4)
SODIUM SERPL-SCNC: 141 MMOL/L (ref 136–145)
TRIGL SERPL-MCNC: 54 MG/DL (ref 37–140)
TSH SERPL-ACNC: 0.85 UIU/ML (ref 0.35–4.94)
VLDLC SERPL CALC-MCNC: 11 MG/DL
WBC # BLD AUTO: 3.46 X10(3)/MCL (ref 4.5–11.5)

## 2024-12-11 PROCEDURE — 90656 IIV3 VACC NO PRSV 0.5 ML IM: CPT | Mod: ,,, | Performed by: NURSE PRACTITIONER

## 2024-12-11 PROCEDURE — 1160F RVW MEDS BY RX/DR IN RCRD: CPT | Mod: CPTII,S$GLB,, | Performed by: NURSE PRACTITIONER

## 2024-12-11 PROCEDURE — 3078F DIAST BP <80 MM HG: CPT | Mod: CPTII,S$GLB,, | Performed by: NURSE PRACTITIONER

## 2024-12-11 PROCEDURE — 90677 PCV20 VACCINE IM: CPT | Mod: ,,, | Performed by: NURSE PRACTITIONER

## 2024-12-11 PROCEDURE — 83036 HEMOGLOBIN GLYCOSYLATED A1C: CPT

## 2024-12-11 PROCEDURE — 1159F MED LIST DOCD IN RCRD: CPT | Mod: CPTII,S$GLB,, | Performed by: NURSE PRACTITIONER

## 2024-12-11 PROCEDURE — 3079F DIAST BP 80-89 MM HG: CPT | Mod: CPTII,,, | Performed by: NURSE PRACTITIONER

## 2024-12-11 PROCEDURE — 1160F RVW MEDS BY RX/DR IN RCRD: CPT | Mod: CPTII,,, | Performed by: NURSE PRACTITIONER

## 2024-12-11 PROCEDURE — 80053 COMPREHEN METABOLIC PANEL: CPT

## 2024-12-11 PROCEDURE — 83735 ASSAY OF MAGNESIUM: CPT

## 2024-12-11 PROCEDURE — 84443 ASSAY THYROID STIM HORMONE: CPT

## 2024-12-11 PROCEDURE — 99999 PR PBB SHADOW E&M-EST. PATIENT-LVL IV: CPT | Mod: PBBFAC,,, | Performed by: NURSE PRACTITIONER

## 2024-12-11 PROCEDURE — 3008F BODY MASS INDEX DOCD: CPT | Mod: CPTII,S$GLB,, | Performed by: NURSE PRACTITIONER

## 2024-12-11 PROCEDURE — 99215 OFFICE O/P EST HI 40 MIN: CPT | Mod: S$GLB,,, | Performed by: NURSE PRACTITIONER

## 2024-12-11 PROCEDURE — 36415 COLL VENOUS BLD VENIPUNCTURE: CPT

## 2024-12-11 PROCEDURE — 3074F SYST BP LT 130 MM HG: CPT | Mod: CPTII,,, | Performed by: NURSE PRACTITIONER

## 2024-12-11 PROCEDURE — 1159F MED LIST DOCD IN RCRD: CPT | Mod: CPTII,,, | Performed by: NURSE PRACTITIONER

## 2024-12-11 PROCEDURE — 80061 LIPID PANEL: CPT

## 2024-12-11 PROCEDURE — 81025 URINE PREGNANCY TEST: CPT

## 2024-12-11 PROCEDURE — 85025 COMPLETE CBC W/AUTO DIFF WBC: CPT

## 2024-12-11 PROCEDURE — G0009 ADMIN PNEUMOCOCCAL VACCINE: HCPCS | Mod: ,,, | Performed by: NURSE PRACTITIONER

## 2024-12-11 PROCEDURE — 3008F BODY MASS INDEX DOCD: CPT | Mod: CPTII,,, | Performed by: NURSE PRACTITIONER

## 2024-12-11 PROCEDURE — G0008 ADMIN INFLUENZA VIRUS VAC: HCPCS | Mod: ,,, | Performed by: NURSE PRACTITIONER

## 2024-12-11 PROCEDURE — 3044F HG A1C LEVEL LT 7.0%: CPT | Mod: CPTII,,, | Performed by: NURSE PRACTITIONER

## 2024-12-11 PROCEDURE — 99396 PREV VISIT EST AGE 40-64: CPT | Mod: 25,,, | Performed by: NURSE PRACTITIONER

## 2024-12-11 PROCEDURE — 3074F SYST BP LT 130 MM HG: CPT | Mod: CPTII,S$GLB,, | Performed by: NURSE PRACTITIONER

## 2024-12-11 RX ORDER — PANTOPRAZOLE SODIUM 40 MG/1
40 TABLET, DELAYED RELEASE ORAL DAILY
Qty: 30 TABLET | Refills: 11 | Status: SHIPPED | OUTPATIENT
Start: 2024-12-11 | End: 2025-12-11

## 2024-12-11 RX ORDER — LORATADINE 10 MG/1
10 TABLET ORAL DAILY
COMMUNITY
Start: 2024-10-28

## 2024-12-11 RX ORDER — DOCUSATE SODIUM 100 MG/1
100 CAPSULE, LIQUID FILLED ORAL 2 TIMES DAILY PRN
COMMUNITY

## 2024-12-11 RX ORDER — RIMEGEPANT SULFATE 75 MG/75MG
75 TABLET, ORALLY DISINTEGRATING ORAL DAILY PRN
Qty: 16 TABLET | Refills: 11 | Status: SHIPPED | OUTPATIENT
Start: 2024-12-11

## 2024-12-11 RX ORDER — LACTULOSE 10 G/15ML
20 SOLUTION ORAL DAILY
Qty: 300 ML | Refills: 0 | Status: SHIPPED | OUTPATIENT
Start: 2024-12-11 | End: 2024-12-21

## 2024-12-11 RX ORDER — VALACYCLOVIR HYDROCHLORIDE 1 G/1
TABLET, FILM COATED ORAL
COMMUNITY
Start: 2024-12-09

## 2024-12-11 NOTE — PROGRESS NOTES
Subjective:       Patient ID: Joo Haywood is a 39 y.o. female.    Chief Complaint: Annual Exam      HPI   This has a 39-year-old white female who presents to clinic today for an annual wellness exam.  Patient reports overall she is doing pretty well.  She is currently undergoing chemotherapy for invasive ductal breast cancer.  Reports overall so far she has been doing well.  Biggest problem has been constipation and heartburn.  Review of Systems  Comprehensive review of systems negative except as stated in HPI    The patient's Health Maintenance was reviewed and the following appears to be due:   There are no preventive care reminders to display for this patient.    Past Medical History:  Past Medical History:   Diagnosis Date    ADHD (attention deficit hyperactivity disorder)     Migraine      Past Surgical History:   Procedure Laterality Date    CERVICAL CONIZATION   W/ LASER  2013    FESS, USING COMPUTER-ASSISTED NAVIGATION, WITH NASAL TURBINATE REDUCTION  2021    Dr. Stevens    FUNCTIONAL ENDOSCOPIC SINUS SURGERY (FESS)      MEDIPORT INSERTION, SINGLE Left 10/18/2024    US GUIDED BIOPSY BREAST RIGHT Right 07/01/2024    USG right breast lumpectomy with right axillary sentinel node biopsy Right 08/09/2024     Review of patient's allergies indicates:  No Known Allergies  Current Outpatient Medications on File Prior to Visit   Medication Sig Dispense Refill    ALPRAZolam (XANAX) 0.5 MG tablet May take 1 tablet (0.5 mg total) by mouth daily as needed for Anxiety. May also take 2 tablets (1 mg total) nightly as needed for Anxiety. 90 tablet 2    amitriptyline (ELAVIL) 10 MG tablet       dexAMETHasone (DECADRON) 4 MG Tab Take 2 tablets (8 mg total) by mouth once daily. On days 2, 3, and 4 only  of each chemotherapy cycle (cycles 1-4). 6 tablet 3    dextroamphetamine-amphetamine (ADDERALL) 20 mg tablet Take 1 tablet by mouth 3 (three) times daily. 90 tablet 0    docusate sodium (COLACE) 100 MG capsule Take 100  mg by mouth 2 (two) times daily as needed for Constipation.      loratadine (CLARITIN) 10 mg tablet Take 10 mg by mouth once daily.      OLANZapine (ZYPREXA) 5 MG tablet Take 1 tablet (5 mg total) by mouth every evening. On days 1, 2, 3, and 4 of each chemotherapy cycle (cycles 1-4) 4 tablet 3    ondansetron (ZOFRAN-ODT) 8 MG TbDL Take 1 tablet (8 mg total) by mouth every 6 (six) hours as needed. 30 tablet 11    prochlorperazine (COMPAZINE) 10 MG tablet Take 1 tablet (10 mg total) by mouth 4 (four) times daily as needed (Nausea). 30 tablet 1    UNABLE TO FIND medication name: COLONMAX      [DISCONTINUED] rimegepant (NURTEC) 75 mg odt Take 1 tablet (75 mg total) by mouth daily as needed for Migraine. Place ODT tablet on the tongue; alternatively the ODT tablet may be placed under the tongue 16 tablet 11     No current facility-administered medications on file prior to visit.     Social History     Socioeconomic History    Marital status: Significant Other   Tobacco Use    Smoking status: Never     Passive exposure: Never    Smokeless tobacco: Never   Substance and Sexual Activity    Alcohol use: Not Currently    Drug use: Never    Sexual activity: Yes     Partners: Male     Birth control/protection: OCP     Social Drivers of Health     Financial Resource Strain: Low Risk  (12/9/2024)    Overall Financial Resource Strain (CARDIA)     Difficulty of Paying Living Expenses: Not very hard   Food Insecurity: No Food Insecurity (12/9/2024)    Hunger Vital Sign     Worried About Running Out of Food in the Last Year: Never true     Ran Out of Food in the Last Year: Never true   Transportation Needs: No Transportation Needs (2/6/2024)    PRAPARE - Transportation     Lack of Transportation (Medical): No     Lack of Transportation (Non-Medical): No   Physical Activity: Inactive (12/9/2024)    Exercise Vital Sign     Days of Exercise per Week: 0 days     Minutes of Exercise per Session: 0 min   Stress: Stress Concern Present  "(12/9/2024)    [employer] - Occupational Stress Questionnaire     Feeling of Stress : To some extent   Housing Stability: Low Risk  (2/6/2024)    Housing Stability Vital Sign     Unable to Pay for Housing in the Last Year: No     Number of Places Lived in the Last Year: 1     Unstable Housing in the Last Year: No     Family History   Problem Relation Name Age of Onset    Ulcerative colitis Mother      Heart disease Father Anthony Clemente     Alcohol abuse Paternal Grandmother Briana clemente        Objective:       /82 (BP Location: Left arm)   Pulse 81   Temp 98.1 °F (36.7 °C) (Oral)   Resp 16   Ht 5' 3" (1.6 m)   Wt 52.2 kg (115 lb)   SpO2 100%   BMI 20.37 kg/m²      Physical Exam  Vitals and nursing note reviewed.   Constitutional:       Appearance: Normal appearance. She is normal weight.   HENT:      Head: Normocephalic and atraumatic.      Right Ear: Tympanic membrane, ear canal and external ear normal.      Left Ear: Tympanic membrane, ear canal and external ear normal.      Nose: Nose normal.      Mouth/Throat:      Mouth: Mucous membranes are moist.      Pharynx: Oropharynx is clear.   Eyes:      Extraocular Movements: Extraocular movements intact.      Conjunctiva/sclera: Conjunctivae normal.      Pupils: Pupils are equal, round, and reactive to light.   Cardiovascular:      Rate and Rhythm: Normal rate and regular rhythm.      Heart sounds: Normal heart sounds.   Pulmonary:      Effort: Pulmonary effort is normal.      Breath sounds: Normal breath sounds.   Abdominal:      General: Abdomen is flat. Bowel sounds are normal.      Palpations: Abdomen is soft.   Musculoskeletal:         General: Normal range of motion.      Cervical back: Normal range of motion and neck supple.   Skin:     General: Skin is warm and dry.   Neurological:      General: No focal deficit present.      Mental Status: She is alert and oriented to person, place, and time.   Psychiatric:         " Mood and Affect: Mood normal.         Behavior: Behavior normal.         Thought Content: Thought content normal.         Judgment: Judgment normal.         Labs  Lab Visit on 12/11/2024   Component Date Value Ref Range Status    Cholesterol Total 12/11/2024 163  <=200 mg/dL Final    HDL Cholesterol 12/11/2024 56  35 - 60 mg/dL Final    Triglyceride 12/11/2024 54  37 - 140 mg/dL Final    Cholesterol/HDL Ratio 12/11/2024 3  0 - 5 Final    Very Low Density Lipoprotein 12/11/2024 11   Final    LDL Cholesterol 12/11/2024 96.00  50.00 - 140.00 mg/dL Final    TSH 12/11/2024 0.853  0.350 - 4.940 uIU/mL Final    Hemoglobin A1c 12/11/2024 5.7  <=7.0 % Final    Estimated Average Glucose 12/11/2024 116.9  mg/dL Final    Sodium 12/11/2024 141  136 - 145 mmol/L Final    Potassium 12/11/2024 4.0  3.5 - 5.1 mmol/L Final    Chloride 12/11/2024 106  98 - 107 mmol/L Final    CO2 12/11/2024 29  22 - 29 mmol/L Final    Glucose 12/11/2024 92  74 - 100 mg/dL Final    Blood Urea Nitrogen 12/11/2024 10.8  7.0 - 18.7 mg/dL Final    Creatinine 12/11/2024 0.74  0.55 - 1.02 mg/dL Final    Calcium 12/11/2024 9.3  8.4 - 10.2 mg/dL Final    Protein Total 12/11/2024 6.8  6.4 - 8.3 gm/dL Final    Albumin 12/11/2024 3.6  3.5 - 5.0 g/dL Final    Globulin 12/11/2024 3.2  2.4 - 3.5 gm/dL Final    Albumin/Globulin Ratio 12/11/2024 1.1  1.1 - 2.0 ratio Final    Bilirubin Total 12/11/2024 0.2  <=1.5 mg/dL Final    ALP 12/11/2024 76  40 - 150 unit/L Final    ALT 12/11/2024 21  0 - 55 unit/L Final    AST 12/11/2024 25  5 - 34 unit/L Final    eGFR 12/11/2024 >60  mL/min/1.73/m2 Final    Anion Gap 12/11/2024 6.0  mEq/L Final    BUN/Creatinine Ratio 12/11/2024 15   Final    Magnesium Level 12/11/2024 2.00  1.60 - 2.60 mg/dL Final    hCG Qualitative, Urine 12/11/2024 Negative  Negative Final    WBC 12/11/2024 3.46 (L)  4.50 - 11.50 x10(3)/mcL Final    RBC 12/11/2024 3.49 (L)  4.20 - 5.40 x10(6)/mcL Final    Hgb 12/11/2024 10.7 (L)  12.0 - 16.0 g/dL Final    Hct  12/11/2024 31.7 (L)  37.0 - 47.0 % Final    MCV 12/11/2024 90.8  80.0 - 94.0 fL Final    MCH 12/11/2024 30.7  27.0 - 31.0 pg Final    MCHC 12/11/2024 33.8  33.0 - 36.0 g/dL Final    RDW 12/11/2024 14.2  11.5 - 17.0 % Final    Platelet 12/11/2024 143  130 - 400 x10(3)/mcL Final    MPV 12/11/2024 8.5  7.4 - 10.4 fL Final    Neut % 12/11/2024 49.2  % Final    Lymph % 12/11/2024 26.0  % Final    Mono % 12/11/2024 17.1  % Final    Eos % 12/11/2024 4.6  % Final    Basophil % 12/11/2024 1.4  % Final    Lymph # 12/11/2024 0.90  0.6 - 4.6 x10(3)/mcL Final    Neut # 12/11/2024 1.70 (L)  2.1 - 9.2 x10(3)/mcL Final    Mono # 12/11/2024 0.59  0.1 - 1.3 x10(3)/mcL Final    Eos # 12/11/2024 0.16  0 - 0.9 x10(3)/mcL Final    Baso # 12/11/2024 0.05  <=0.2 x10(3)/mcL Final    IG# 12/11/2024 0.06 (H)  0 - 0.04 x10(3)/mcL Final    IG% 12/11/2024 1.7  % Final   Lab Visit on 11/27/2024   Component Date Value Ref Range Status    Sodium 11/27/2024 139  136 - 145 mmol/L Final    Potassium 11/27/2024 4.0  3.5 - 5.1 mmol/L Final    Chloride 11/27/2024 107  98 - 107 mmol/L Final    CO2 11/27/2024 24  22 - 29 mmol/L Final    Glucose 11/27/2024 89  74 - 100 mg/dL Final    Blood Urea Nitrogen 11/27/2024 11.8  7.0 - 18.7 mg/dL Final    Creatinine 11/27/2024 0.76  0.55 - 1.02 mg/dL Final    Calcium 11/27/2024 9.5  8.4 - 10.2 mg/dL Final    Protein Total 11/27/2024 6.9  6.4 - 8.3 gm/dL Final    Albumin 11/27/2024 3.9  3.5 - 5.0 g/dL Final    Globulin 11/27/2024 3.0  2.4 - 3.5 gm/dL Final    Albumin/Globulin Ratio 11/27/2024 1.3  1.1 - 2.0 ratio Final    Bilirubin Total 11/27/2024 0.3  <=1.5 mg/dL Final    ALP 11/27/2024 71  40 - 150 unit/L Final    ALT 11/27/2024 22  0 - 55 unit/L Final    AST 11/27/2024 31  5 - 34 unit/L Final    eGFR 11/27/2024 >60  mL/min/1.73/m2 Final    Anion Gap 11/27/2024 8.0  mEq/L Final    BUN/Creatinine Ratio 11/27/2024 16   Final    Magnesium Level 11/27/2024 2.10  1.60 - 2.60 mg/dL Final    hCG Qualitative, Urine  11/27/2024 Negative  Negative Final    WBC 11/27/2024 6.31  4.50 - 11.50 x10(3)/mcL Final    RBC 11/27/2024 3.82 (L)  4.20 - 5.40 x10(6)/mcL Final    Hgb 11/27/2024 11.6 (L)  12.0 - 16.0 g/dL Final    Hct 11/27/2024 34.0 (L)  37.0 - 47.0 % Final    MCV 11/27/2024 89.0  80.0 - 94.0 fL Final    MCH 11/27/2024 30.4  27.0 - 31.0 pg Final    MCHC 11/27/2024 34.1  33.0 - 36.0 g/dL Final    RDW 11/27/2024 14.1  11.5 - 17.0 % Final    Platelet 11/27/2024 202  130 - 400 x10(3)/mcL Final    MPV 11/27/2024 8.4  7.4 - 10.4 fL Final    Neut % 11/27/2024 58.6  % Final    Lymph % 11/27/2024 25.2  % Final    Mono % 11/27/2024 12.4  % Final    Eos % 11/27/2024 1.3  % Final    Basophil % 11/27/2024 0.8  % Final    Lymph # 11/27/2024 1.59  0.6 - 4.6 x10(3)/mcL Final    Neut # 11/27/2024 3.70  2.1 - 9.2 x10(3)/mcL Final    Mono # 11/27/2024 0.78  0.1 - 1.3 x10(3)/mcL Final    Eos # 11/27/2024 0.08  0 - 0.9 x10(3)/mcL Final    Baso # 11/27/2024 0.05  <=0.2 x10(3)/mcL Final    IG# 11/27/2024 0.11 (H)  0 - 0.04 x10(3)/mcL Final    IG% 11/27/2024 1.7  % Final   Lab Visit on 11/08/2024   Component Date Value Ref Range Status    Sodium 11/08/2024 141  136 - 145 mmol/L Final    Potassium 11/08/2024 4.2  3.5 - 5.1 mmol/L Final    Chloride 11/08/2024 108 (H)  98 - 107 mmol/L Final    CO2 11/08/2024 28  22 - 29 mmol/L Final    Glucose 11/08/2024 103 (H)  74 - 100 mg/dL Final    Blood Urea Nitrogen 11/08/2024 9.7  7.0 - 18.7 mg/dL Final    Creatinine 11/08/2024 0.77  0.55 - 1.02 mg/dL Final    Calcium 11/08/2024 9.7  8.4 - 10.2 mg/dL Final    Protein Total 11/08/2024 6.8  6.4 - 8.3 gm/dL Final    Albumin 11/08/2024 3.8  3.5 - 5.0 g/dL Final    Globulin 11/08/2024 3.0  2.4 - 3.5 gm/dL Final    Albumin/Globulin Ratio 11/08/2024 1.3  1.1 - 2.0 ratio Final    Bilirubin Total 11/08/2024 0.1  <=1.5 mg/dL Final    ALP 11/08/2024 76  40 - 150 unit/L Final    ALT 11/08/2024 26  0 - 55 unit/L Final    AST 11/08/2024 31  5 - 34 unit/L Final    eGFR  11/08/2024 >60  mL/min/1.73/m2 Final    Anion Gap 11/08/2024 5.0  mEq/L Final    BUN/Creatinine Ratio 11/08/2024 13   Final    Magnesium Level 11/08/2024 2.10  1.60 - 2.60 mg/dL Final    hCG Qualitative, Urine 11/08/2024 Negative  Negative Final    WBC 11/08/2024 9.84  4.50 - 11.50 x10(3)/mcL Final    RBC 11/08/2024 3.77 (L)  4.20 - 5.40 x10(6)/mcL Final    Hgb 11/08/2024 11.4 (L)  12.0 - 16.0 g/dL Final    Hct 11/08/2024 33.9 (L)  37.0 - 47.0 % Final    MCV 11/08/2024 89.9  80.0 - 94.0 fL Final    MCH 11/08/2024 30.2  27.0 - 31.0 pg Final    MCHC 11/08/2024 33.6  33.0 - 36.0 g/dL Final    RDW 11/08/2024 13.0  11.5 - 17.0 % Final    Platelet 11/08/2024 187  130 - 400 x10(3)/mcL Final    MPV 11/08/2024 8.7  7.4 - 10.4 fL Final    Neut % 11/08/2024 59.3  % Final    Lymph % 11/08/2024 19.6  % Final    Mono % 11/08/2024 8.4  % Final    Eos % 11/08/2024 1.0  % Final    Basophil % 11/08/2024 0.2  % Final    Lymph # 11/08/2024 1.93  0.6 - 4.6 x10(3)/mcL Final    Neut # 11/08/2024 5.83  2.1 - 9.2 x10(3)/mcL Final    Mono # 11/08/2024 0.83  0.1 - 1.3 x10(3)/mcL Final    Eos # 11/08/2024 0.10  0 - 0.9 x10(3)/mcL Final    Baso # 11/08/2024 0.02  <=0.2 x10(3)/mcL Final    IG# 11/08/2024 1.13 (H)  0 - 0.04 x10(3)/mcL Final    IG% 11/08/2024 11.5  % Final   Infusion on 10/28/2024   Component Date Value Ref Range Status    POC Preg Test, Ur 10/28/2024 Negative  Negative Final     Acceptable 10/28/2024 Yes   Final   Lab Visit on 10/25/2024   Component Date Value Ref Range Status    Sodium 10/25/2024 139  136 - 145 mmol/L Final    Potassium 10/25/2024 4.6  3.5 - 5.1 mmol/L Final    Chloride 10/25/2024 107  98 - 107 mmol/L Final    CO2 10/25/2024 28  22 - 29 mmol/L Final    Glucose 10/25/2024 92  74 - 100 mg/dL Final    Blood Urea Nitrogen 10/25/2024 15.5  7.0 - 18.7 mg/dL Final    Creatinine 10/25/2024 0.77  0.55 - 1.02 mg/dL Final    Calcium 10/25/2024 9.3  8.4 - 10.2 mg/dL Final    Protein Total 10/25/2024 6.7  6.4  - 8.3 gm/dL Final    Albumin 10/25/2024 3.9  3.5 - 5.0 g/dL Final    Globulin 10/25/2024 2.8  2.4 - 3.5 gm/dL Final    Albumin/Globulin Ratio 10/25/2024 1.4  1.1 - 2.0 ratio Final    Bilirubin Total 10/25/2024 0.5  <=1.5 mg/dL Final    ALP 10/25/2024 56  40 - 150 unit/L Final    ALT 10/25/2024 49  0 - 55 unit/L Final    AST 10/25/2024 54 (H)  5 - 34 unit/L Final    eGFR 10/25/2024 >60  mL/min/1.73/m2 Final    Anion Gap 10/25/2024 4.0  mEq/L Final    BUN/Creatinine Ratio 10/25/2024 20   Final    Magnesium Level 10/25/2024 1.90  1.60 - 2.60 mg/dL Final    WBC 10/25/2024 6.99  4.50 - 11.50 x10(3)/mcL Final    RBC 10/25/2024 4.15 (L)  4.20 - 5.40 x10(6)/mcL Final    Hgb 10/25/2024 12.4  12.0 - 16.0 g/dL Final    Hct 10/25/2024 37.7  37.0 - 47.0 % Final    MCV 10/25/2024 90.8  80.0 - 94.0 fL Final    MCH 10/25/2024 29.9  27.0 - 31.0 pg Final    MCHC 10/25/2024 32.9 (L)  33.0 - 36.0 g/dL Final    RDW 10/25/2024 13.4  11.5 - 17.0 % Final    Platelet 10/25/2024 220  130 - 400 x10(3)/mcL Final    MPV 10/25/2024 8.3  7.4 - 10.4 fL Final    Neut % 10/25/2024 53.7  % Final    Lymph % 10/25/2024 27.6  % Final    Mono % 10/25/2024 9.6  % Final    Eos % 10/25/2024 8.7  % Final    Basophil % 10/25/2024 0.3  % Final    Lymph # 10/25/2024 1.93  0.6 - 4.6 x10(3)/mcL Final    Neut # 10/25/2024 3.75  2.1 - 9.2 x10(3)/mcL Final    Mono # 10/25/2024 0.67  0.1 - 1.3 x10(3)/mcL Final    Eos # 10/25/2024 0.61  0 - 0.9 x10(3)/mcL Final    Baso # 10/25/2024 0.02  <=0.2 x10(3)/mcL Final    IG# 10/25/2024 0.01  0 - 0.04 x10(3)/mcL Final    IG% 10/25/2024 0.1  % Final   Hospital Outpatient Visit on 10/10/2024   Component Date Value Ref Range Status    Lawson's Biplane MOD Ejection Fra* 10/10/2024 63  % Final    A2C EF 10/10/2024 62  % Final    A4C EF 10/10/2024 64  % Final    LVOT stroke volume 10/10/2024 69.7  cm3 Final    LVIDd 10/10/2024 3.4 (A)  3.5 - 6.0 cm Final    LV Systolic Volume 10/10/2024 19.50  mL Final    LVIDs 10/10/2024 2.4  2.1  - 4.0 cm Final    LV ESV A2C 10/10/2024 18.30  mL Final    LV Diastolic Volume 10/10/2024 47.80  mL Final    LV ESV A4C 10/10/2024 13.60  mL Final    LV EDV A2C 10/10/2024 [id number]  mL Final    LV EDV A4C 10/10/2024 58.40  mL Final    Left Ventricular End Systolic Volu* 10/10/2024 19.50  mL Final    Left Ventricular End Diastolic Vol* 10/10/2024 47.80  mL Final    IVS 10/10/2024 0.8  0.6 - 1.1 cm Final    LVOT diameter 10/10/2024 2.0  cm Final    LVOT area 10/10/2024 3.1  cm2 Final    FS 10/10/2024 29.4  28 - 44 % Final    Left Ventricle Relative Wall Thick* 10/10/2024 0.47  cm Final    PW 10/10/2024 0.8  0.6 - 1.1 cm Final    LV mass 10/10/2024 71.9  g Final    MV Peak E Robin 10/10/2024 1.22  m/s Final    TDI LATERAL 10/10/2024 0.23  m/s Final    TDI SEPTAL 10/10/2024 0.14  m/s Final    E/E' ratio 10/10/2024 6.59  m/s Final    MV Peak A Robin 10/10/2024 0.58  m/s Final    TR Max Robin 10/10/2024 1.8  m/s Final    E/A ratio 10/10/2024 2.10   Final    E wave deceleration time 10/10/2024 248.00  msec Final    LV SEPTAL E/E' RATIO 10/10/2024 8.71  m/s Final    LV LATERAL E/E' RATIO 10/10/2024 5.30  m/s Final    LVOT peak robin 10/10/2024 1.1  m/s Final    Left Ventricular Outflow Tract Sudha* 10/10/2024 0.71  cm/s Final    Left Ventricular Outflow Tract Sudha* 10/10/2024 2.00  mmHg Final    TAPSE 10/10/2024 2.46  cm Final    LA size 10/10/2024 2.40  cm Final    LA Vol (MOD) 10/10/2024 16.00  mL Final    AV mean gradient 10/10/2024 5.0  mmHg Final    AV peak gradient 10/10/2024 7.8  mmHg Final    Ao peak robin 10/10/2024 1.4  m/s Final    Ao VTI 10/10/2024 29.6  cm Final    LVOT peak VTI 10/10/2024 22.2  cm Final    AV valve area 10/10/2024 2.4  cm² Final    AV Velocity Ratio 10/10/2024 0.79   Final    AV index (prosthetic) 10/10/2024 0.75   Final    NATIVIDAD by Velocity Ratio 10/10/2024 2.5  cm² Final    MV mean gradient 10/10/2024 2  mmHg Final    MV peak gradient 10/10/2024 8  mmHg Final    MV stenosis pressure 1/2 time  10/10/2024 89.00  ms Final    MV valve area p 1/2 method 10/10/2024 2.47  cm2 Final    MV valve area by continuity eq 10/10/2024 1.89  cm2 Final    MV VTI 10/10/2024 36.9  cm Final    Triscuspid Valve Regurgitation Pea* 10/10/2024 13  mmHg Final    Mean e' 10/10/2024 0.19  m/s Final    LA area A4C 10/10/2024 8.77  cm2 Final    LA area A2C 10/10/2024 10.30  cm2 Final    GLS 10/10/2024 25.0  % Final    Mitral Valve Heart Rate 10/10/2024 71  bpm Final    TV resting pulmonary artery pressu* 10/10/2024 21  mmHg Final    RV TB RVSP 10/10/2024 10  mmHg Final    Est. RA pres 10/10/2024 8  mmHg Final    Sinus 10/10/2024 2.8  cm Final   Documentation Only on 06/27/2024   Component Date Value Ref Range Status    BCS Recommendation External 06/13/2024 Additional testing recommended   Final   Documentation Only on 06/14/2024   Component Date Value Ref Range Status    BCS Recommendation External 06/13/2024 Repeat mammogram in 1 year   Final       Assessment and Plan       ICD-10-CM ICD-9-CM   1. Annual physical exam  Z00.00 V70.0   2. Migraine with aura and without status migrainosus, not intractable  G43.109 346.00   3. Depression, unspecified depression type  F32.A 311   4. Attention deficit hyperactivity disorder (ADHD), predominantly inattentive type  F90.0 314.00   5. Anxiety state  F41.1 300.00   6. Malignant neoplasm metastatic to lymph node of axilla  C77.3 196.3   7. Infiltrating ductal carcinoma of right breast  C50.911 174.9   8. Prediabetes  R73.03 790.29   9. Insomnia, unspecified type  G47.00 780.52   10. Gastroesophageal reflux disease, unspecified whether esophagitis present  K21.9 530.81   11. Need for influenza vaccination  Z23 V04.81   12. Need for pneumococcal vaccination  Z23 V03.82   13. Constipation, unspecified constipation type  K59.00 564.00   14. Herpes labialis  B00.1 054.9        1. Annual physical exam  Overview:  Annual wellness exam yearly in December    Assessment & Plan:  Prevnar 20 and influenza  vaccine given in office today.  Labs reviewed in detail with patient.      2. Migraine with aura and without status migrainosus, not intractable  Overview:  Diagnosed in her 20's by Dr Tabby Manrique not covered by insurance but worked  Imitrex did not help   On Zomig 5 mg as needed but has side effects  12/05/2023 - start Nurtec 75 mg ODT    Assessment & Plan:  Stable, continue Nurtec as needed, follow-up 3 months with virtual visit.    Orders:  -     rimegepant (NURTEC) 75 mg odt; Take 1 tablet (75 mg total) by mouth daily as needed for Migraine. Place ODT tablet on the tongue; alternatively the ODT tablet may be placed under the tongue  Dispense: 16 tablet; Refill: 11    3. Depression, unspecified depression type  Overview:  03/25/2024 - Viibryd 10 mg daily (never started)    Assessment & Plan:  Reports doing well without medication.  Will continue to monitor for any worsening.  Follow-up 3 months with virtual visit.      4. Attention deficit hyperactivity disorder (ADHD), predominantly inattentive type  Overview:  DX age 22   Adderall since diagnosis   Adderall 20 mg TID  Previously managed by Dena Rico, PhD psychology     Assessment & Plan:  Stable, continue Adderall, follow-up 3 months with virtual visit.      5. Anxiety state  Overview:  02/06/2024 - start alprazolam 0.25 mg as needed at bedtime    04/11/2024 - alprazolam 0.5 mg at bedtime     July 2024 - diagnosed with breast cancer     08/05/2024 - alprazolam 0.5 mg b.i.d. p.r.n.    10/29/2024 - Xanax 0.5 mg daily as needed and 0.5 mg 2 tablets at bedtime as needed    Assessment & Plan:  Stable, continue Xanax 0.5 mg daily as needed and 1 mg at bedtime as needed.  Follow-up 3 months with virtual visit.      6. Malignant neoplasm metastatic to lymph node of axilla  Overview:  Dr Barton  See problem list overview for infiltrating ductal carcinoma of right breast for details    Assessment & Plan:  Recent oncology records reviewed, continue management per  Oncology.      7. Infiltrating ductal carcinoma of right breast  Overview:  Initially felt lump January 2024 - insurance changes with work, did not get imaging    06/13/2024 - baseline screening mammogram @ [facility] - breasts are extremely dense, category D, no evidence of malignancy    06/26/2024 - bilateral breast US at [facility] - right breast mass at 11:00 a.m. 4 cm from the nipple, 2 x 1 x 1.8 x 2 x 2 cm, characteristics suspicious for malignancy    07/01/2024 - biopsy showing grade 3 invasive ductal carcinoma, receptors return ER/HI positive, HER2 negative    07/11/2024 - first consult surgical oncology Dr Amin     07/18/2024 - MRI breast @ [facility] - right breast round enhancing mass with microlobulated and spiculated margins, 2 x 8 x 2.4 x 2 x 3 cm.  No other enhancing masses or any other findings which are suspicious for malignancy    The patient then underwent a right breast lumpectomy with sentinel lymph node on 08/09/2024.  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  Superior margin reexcision was done and showed benign tissue.  There were 8 lymph nodes removed, 1 was positive for metastatic invasive ductal carcinoma.  Final pathologic stage was pT2, N1a, M0 stage II.     09/04/2024 - established care with Dr Barton    CT chest, abdomen, and pelvis done on 10/02/2024 showed no evidence of metastatic disease in the chest, abdomen, or pelvis.     Adjuvant chemotherapy with dose dense AC followed by weekly Taxol.  Started on 10/28/2024     Assessment & Plan:  Recent oncology records reviewed, continue management per Oncology.      8. Prediabetes  Assessment & Plan:  Hemoglobin A1c stable at 5.7.  Patient is receiving steroids due to her chemotherapy currently.  Discussed low-carbohydrate/low sugar diet.  Patient aware of signs and symptoms of hyperglycemia.  Getting serial labs with Oncology, I will continue to monitor glucose levels.  Follow-up 3 months with virtual visit.      9. Insomnia,  unspecified type  Overview:  09/16/2024 - amitriptyline 10 mg 1-2 tablets at bedtime  09/18/2024 - discontinue amitriptyline due to headache and dry mouth, start trazodone 50 mg at bedtime  11/11/2024 - restarted amitriptyline at bedtime    Assessment & Plan:  Improved, continue amitriptyline, Xanax at bedtime, follow-up with virtual visit in 3 months      10. Gastroesophageal reflux disease, unspecified whether esophagitis present  Overview:  Medication induced    12/11/2024 - start pantoprazole 40 mg daily    Assessment & Plan:  Start pantoprazole 40 mg daily.  Recommend bland diet.  Recommend avoidance of NSAIDs when possible.  Follow-up 3 months.    Orders:  -     pantoprazole (PROTONIX) 40 MG tablet; Take 1 tablet (40 mg total) by mouth once daily.  Dispense: 30 tablet; Refill: 11    11. Need for influenza vaccination  -     influenza (Flulaval, Fluzone, Fluarix) 45 mcg/0.5 mL IM vaccine (> or = 6 mo) 0.5 mL    12. Need for pneumococcal vaccination  -     pneumoc 20-chioma conj-dip cr(PF) (PREVNAR-20 (PF)) injection Syrg 0.5 mL    13. Constipation, unspecified constipation type  Assessment & Plan:  Recommend taking the lactulose daily for 10 days as recommended by Oncology and then starting MiraLax daily.      14. Herpes labialis  Overview:  Lysine daily for prevention  Valtrex as needed for outbreaks    Assessment & Plan:  Patient also with history of genital herpes, has not had an outbreak since high school.  Recent outbreak, treated with Valtrex and went away.  Did discuss putting her on 500 mg daily for prevention, she would like to hold off at this time.             Follow up in about 3 months (around 3/11/2025) for Virtual Visit.

## 2024-12-11 NOTE — PROGRESS NOTES
Subjective:       Patient ID: Joo Haywood is a 39 y.o. female.    Chief Complaint: Follow-up (Patient reports constipation )      Diagnosis:  pT2, N1a, M0 stage II invasive ductal carcinoma, grade 3, ER 89.9% positive, KS 32.6% positive, HER2 Tyshawn negative by IHC (0) with a Ki-67 of 81.8%.    Current Treatment:   Adjuvant chemotherapy with dose dense AC followed by weekly Taxol.  Started on 10/28/2024.  Patient will need adjuvant radiation due to lymph node involvement and lumpectomy.  Patient will need adjuvant endocrine therapy, due to bonilla involvement would recommend ovarian suppression and aromatase inhibitor for 10 years    Treatment History:  Right-sided lumpectomy and sentinel lymph node on 08/09/2024    HPI:  39-year-old female who palpated a breast mass in January 2024.  After undergoing some insurance changes, she underwent  screening digital mammogram with tomosynthesis on 06/13/2024 at [facility].  This showed a subtle 2.0 cm round focal asymmetry in the right breast at the 12 o'clock position.  This corresponded to a lump that the patient had palpated.  This was BI-RADS 0, needs additional assessment.  Patient then underwent bilateral breast ultrasound on 06/26/2024, this revealed a mostly round hypoechoic mass with a microlobulated margin and heterogeneous texture located in the right breast at the 11 o'clock position 4 cm from the nipple measuring 2.1 x 1.8 x 2.2 cm.  This was suspicious for malignancy.  There were no other masses or other findings that were suspicious.  The right axilla showed nonspecific benign-appearing lymph nodes without evidence of malignancy.  This was read as BI-RADS 4, biopsy recommended.  Patient underwent a biopsy on 07/01/2024, pathology revealed invasive ductal carcinoma, grade 3.  Breast panel showed ER 89.9% positive, KS 32.6% positive, HER2 negative by IHC (0) with a Ki-67 of 81.8%.  The patient was then seen by Dr. Candelario Amin on  07/11/2024, plan was for right-sided lumpectomy and sentinel lymph node.  She had a breast MRI on 07/18/2024 that showed a 2.8 x 2.4 x 2.3 cm round enhancing mass with microlobulated and spiculated margins located in the right breast at the 12 o'clock position 4 cm from the nipple, this was consistent with patient's biopsy-proven invasive ductal carcinoma.  There were no lymph nodes in the right axilla or in the internal mammary chain that were suspicious.  The patient then underwent a right breast lumpectomy with sentinel lymph node on 08/09/2024.  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  Superior margin reexcision was done and showed benign tissue.  There were 8 lymph nodes removed, 1 was positive for metastatic invasive ductal carcinoma.  Final pathologic stage was pT2, N1a, M0 stage II.  I initially saw the patient on 09/04/2024. Oncotype DX returned with a score of 44, greater than 12% chance of distant recurrence at 9 years with an AI or tamoxifen alone, and chemotherapy benefit.     CT chest, abdomen, and pelvis done on 10/02/2024 showed no evidence of metastatic disease in the chest, abdomen, or pelvis.    Echocardiogram done on 10/10/2024 showed an EF of 60-65%.    Adjuvant chemotherapy with dose dense AC started on 10/28/2024.     Interval History:   Patient presents to clinic for scheduled follow up appointment and treatment visit.  She is currently being treated with dose dense AC in his due for cycle 4 on Monday.  She is tolerating treatment pretty well thus far though she is having some issues with constipation.  She is having some issues with reflux/heartburn as well, no particular issues with nausea.  She denies any pain, shortness of breath, neuropathy.    Past Medical History:   Diagnosis Date    ADHD (attention deficit hyperactivity disorder)     Migraine       Past Surgical History:   Procedure Laterality Date    CERVICAL CONIZATION   W/ LASER  2013    FESS, USING  COMPUTER-ASSISTED NAVIGATION, WITH NASAL TURBINATE REDUCTION  2021    Dr. Stevens    FUNCTIONAL ENDOSCOPIC SINUS SURGERY (FESS)       Social History     Socioeconomic History    Marital status: Significant Other   Tobacco Use    Smoking status: Never     Passive exposure: Never    Smokeless tobacco: Never   Substance and Sexual Activity    Alcohol use: Not Currently    Drug use: Never    Sexual activity: Yes     Partners: Male     Birth control/protection: OCP     Social Drivers of Health     Financial Resource Strain: Low Risk  (12/9/2024)    Overall Financial Resource Strain (CARDIA)     Difficulty of Paying Living Expenses: Not very hard   Food Insecurity: No Food Insecurity (12/9/2024)    Hunger Vital Sign     Worried About Running Out of Food in the Last Year: Never true     Ran Out of Food in the Last Year: Never true   Transportation Needs: No Transportation Needs (2/6/2024)    PRAPARE - Transportation     Lack of Transportation (Medical): No     Lack of Transportation (Non-Medical): No   Physical Activity: Inactive (12/9/2024)    Exercise Vital Sign     Days of Exercise per Week: 0 days     Minutes of Exercise per Session: 0 min   Stress: Stress Concern Present (12/9/2024)    [employer] - Occupational Stress Questionnaire     Feeling of Stress : To some extent   Housing Stability: Low Risk  (2/6/2024)    Housing Stability Vital Sign     Unable to Pay for Housing in the Last Year: No     Number of Places Lived in the Last Year: 1     Unstable Housing in the Last Year: No      Family History   Problem Relation Name Age of Onset    Ulcerative colitis Mother      Heart disease Father Anthony Stevens     Alcohol abuse Paternal Grandmother Briana chyna       Review of patient's allergies indicates:  No Known Allergies   Review of Systems   Constitutional:  Negative for appetite change and unexpected weight change.   HENT:  Positive for mouth sores.    Eyes:  Positive for visual  disturbance.   Respiratory:  Negative for cough and shortness of breath.    Cardiovascular:  Negative for chest pain.   Gastrointestinal:  Negative for abdominal pain and diarrhea.   Genitourinary:  Negative for frequency.   Musculoskeletal:  Positive for back pain.   Integumentary:  Negative for rash.   Neurological:  Positive for headaches.   Hematological:  Negative for adenopathy.   Psychiatric/Behavioral:  The patient is nervous/anxious.          Objective:      Physical Exam  Vitals reviewed.   Constitutional:       General: She is not in acute distress.     Appearance: Normal appearance.   HENT:      Head: Normocephalic and atraumatic.      Nose: Nose normal.      Mouth/Throat:      Mouth: Mucous membranes are moist.   Eyes:      Extraocular Movements: Extraocular movements intact.      Conjunctiva/sclera: Conjunctivae normal.   Cardiovascular:      Rate and Rhythm: Normal rate and regular rhythm.      Pulses: Normal pulses.      Heart sounds: Normal heart sounds.   Pulmonary:      Effort: Pulmonary effort is normal.      Breath sounds: Normal breath sounds.   Musculoskeletal:         General: No swelling. Normal range of motion.      Cervical back: Normal range of motion and neck supple.      Right lower leg: No edema.      Left lower leg: No edema.   Skin:     General: Skin is warm and dry.   Neurological:      General: No focal deficit present.      Mental Status: She is alert and oriented to person, place, and time. Mental status is at baseline.   Psychiatric:         Mood and Affect: Mood normal.         Behavior: Behavior normal.         LABS AND IMAGING REVIEWED IN EPIC          Assessment:   pT2, N1a, M0 stage II invasive ductal carcinoma, grade 3, ER 89.9% positive, WA 32.6% positive, HER2 Tyshawn negative by IHC (0) with a Ki-67 of 81.8%.        Plan:       We had a long conversation about the overall good prognosis.  We discussed Oncotype DX results.  Goal of treatment is cure.    I recommended dose  dense AC followed by weekly Taxol.    She will then need adjuvant radiation.    I explained that due to her hormone receptor positivity, she would need adjuvant endocrine therapy. I would recommend 10 years of therapy due to bonilla involvement. I also discussed the possibility of ovarian suppression + aromatase inhibitor. She did inquire about having a tubal ligation and ablation, but now her plan is to have a bilateral oophorectomy in July 2025.    Discussed fertility, patient is done with child bearing.    Radiation oncology,  Prellop    CT chest, abdomen, and pelvis done on 10/02/2024 showed no evidence of disease.    Echocardiogram done on 10/10/2024 showed an EF of 60-65%.    Started AC in a dose dense fashion every 2 weeks on 10/28/2024.    We will send in a prescription for lactulose for her to use as needed for constipation.  She can use omeprazole, Pepcid, Tums for heartburn.    Okay to proceed with cycle 4 on Monday    Labs and Taxol in 2 weeks    She will return to clinic prior to cycle 2 Taxol in 3 weeks with labs     HOMER Mulligan

## 2024-12-11 NOTE — ASSESSMENT & PLAN NOTE
Stable, continue Xanax 0.5 mg daily as needed and 1 mg at bedtime as needed.  Follow-up 3 months with virtual visit.

## 2024-12-11 NOTE — ASSESSMENT & PLAN NOTE
Start pantoprazole 40 mg daily.  Recommend bland diet.  Recommend avoidance of NSAIDs when possible.  Follow-up 3 months.

## 2024-12-11 NOTE — ASSESSMENT & PLAN NOTE
Reports doing well without medication.  Will continue to monitor for any worsening.  Follow-up 3 months with virtual visit.

## 2024-12-11 NOTE — ASSESSMENT & PLAN NOTE
Recommend taking the lactulose daily for 10 days as recommended by Oncology and then starting MiraLax daily.

## 2024-12-11 NOTE — ASSESSMENT & PLAN NOTE
Hemoglobin A1c stable at 5.7.  Patient is receiving steroids due to her chemotherapy currently.  Discussed low-carbohydrate/low sugar diet.  Patient aware of signs and symptoms of hyperglycemia.  Getting serial labs with Oncology, I will continue to monitor glucose levels.  Follow-up 3 months with virtual visit.

## 2024-12-11 NOTE — ASSESSMENT & PLAN NOTE
Patient also with history of genital herpes, has not had an outbreak since high school.  Recent outbreak, treated with Valtrex and went away.  Did discuss putting her on 500 mg daily for prevention, she would like to hold off at this time.

## 2024-12-13 RX ORDER — HEPARIN 100 UNIT/ML
500 SYRINGE INTRAVENOUS
OUTPATIENT
Start: 2024-12-16

## 2024-12-13 RX ORDER — DIPHENHYDRAMINE HYDROCHLORIDE 50 MG/ML
50 INJECTION INTRAMUSCULAR; INTRAVENOUS ONCE AS NEEDED
OUTPATIENT
Start: 2024-12-16

## 2024-12-13 RX ORDER — DOXORUBICIN HYDROCHLORIDE 2 MG/ML
60 INJECTION, SOLUTION INTRAVENOUS
OUTPATIENT
Start: 2024-12-16

## 2024-12-13 RX ORDER — EPINEPHRINE 0.3 MG/.3ML
0.3 INJECTION SUBCUTANEOUS ONCE AS NEEDED
OUTPATIENT
Start: 2024-12-16

## 2024-12-13 RX ORDER — SODIUM CHLORIDE 0.9 % (FLUSH) 0.9 %
10 SYRINGE (ML) INJECTION
OUTPATIENT
Start: 2024-12-16

## 2024-12-16 ENCOUNTER — INFUSION (OUTPATIENT)
Dept: INFUSION THERAPY | Facility: HOSPITAL | Age: 39
End: 2024-12-16
Attending: INTERNAL MEDICINE
Payer: COMMERCIAL

## 2024-12-16 VITALS
HEART RATE: 84 BPM | HEIGHT: 63 IN | TEMPERATURE: 97 F | RESPIRATION RATE: 16 BRPM | OXYGEN SATURATION: 100 % | WEIGHT: 117.81 LBS | BODY MASS INDEX: 20.88 KG/M2 | DIASTOLIC BLOOD PRESSURE: 70 MMHG | SYSTOLIC BLOOD PRESSURE: 110 MMHG

## 2024-12-16 DIAGNOSIS — C77.3 MALIGNANT NEOPLASM METASTATIC TO LYMPH NODE OF AXILLA: Primary | ICD-10-CM

## 2024-12-16 DIAGNOSIS — C50.911 INFILTRATING DUCTAL CARCINOMA OF RIGHT BREAST: ICD-10-CM

## 2024-12-16 PROCEDURE — 63600175 PHARM REV CODE 636 W HCPCS: Mod: JZ,JG | Performed by: NURSE PRACTITIONER

## 2024-12-16 PROCEDURE — 96375 TX/PRO/DX INJ NEW DRUG ADDON: CPT

## 2024-12-16 PROCEDURE — A4216 STERILE WATER/SALINE, 10 ML: HCPCS | Performed by: NURSE PRACTITIONER

## 2024-12-16 PROCEDURE — 25000003 PHARM REV CODE 250: Performed by: NURSE PRACTITIONER

## 2024-12-16 PROCEDURE — 96413 CHEMO IV INFUSION 1 HR: CPT

## 2024-12-16 PROCEDURE — 96411 CHEMO IV PUSH ADDL DRUG: CPT

## 2024-12-16 PROCEDURE — 96367 TX/PROPH/DG ADDL SEQ IV INF: CPT

## 2024-12-16 RX ORDER — DIPHENHYDRAMINE HYDROCHLORIDE 50 MG/ML
50 INJECTION INTRAMUSCULAR; INTRAVENOUS ONCE AS NEEDED
Status: DISCONTINUED | OUTPATIENT
Start: 2024-12-16 | End: 2024-12-16 | Stop reason: HOSPADM

## 2024-12-16 RX ORDER — SODIUM CHLORIDE 0.9 % (FLUSH) 0.9 %
10 SYRINGE (ML) INJECTION
Status: DISCONTINUED | OUTPATIENT
Start: 2024-12-16 | End: 2024-12-16 | Stop reason: HOSPADM

## 2024-12-16 RX ORDER — EPINEPHRINE 0.3 MG/.3ML
0.3 INJECTION SUBCUTANEOUS ONCE AS NEEDED
Status: DISCONTINUED | OUTPATIENT
Start: 2024-12-16 | End: 2024-12-16 | Stop reason: HOSPADM

## 2024-12-16 RX ORDER — DOXORUBICIN HYDROCHLORIDE 2 MG/ML
60 INJECTION, SOLUTION INTRAVENOUS
Status: COMPLETED | OUTPATIENT
Start: 2024-12-16 | End: 2024-12-16

## 2024-12-16 RX ORDER — HEPARIN 100 UNIT/ML
500 SYRINGE INTRAVENOUS
Status: DISCONTINUED | OUTPATIENT
Start: 2024-12-16 | End: 2024-12-16 | Stop reason: HOSPADM

## 2024-12-16 RX ADMIN — HEPARIN 500 UNITS: 100 SYRINGE at 03:12

## 2024-12-16 RX ADMIN — CYCLOPHOSPHAMIDE 920 MG: 200 INJECTION, SOLUTION INTRAVENOUS at 03:12

## 2024-12-16 RX ADMIN — Medication 10 ML: at 03:12

## 2024-12-16 RX ADMIN — DOXORUBICIN HYDROCHLORIDE 92 MG: 2 INJECTION, SOLUTION INTRAVENOUS at 03:12

## 2024-12-16 RX ADMIN — SODIUM CHLORIDE: 9 INJECTION, SOLUTION INTRAVENOUS at 02:12

## 2024-12-16 RX ADMIN — DEXAMETHASONE SODIUM PHOSPHATE 0.25 MG: 4 INJECTION, SOLUTION INTRA-ARTICULAR; INTRALESIONAL; INTRAMUSCULAR; INTRAVENOUS; SOFT TISSUE at 02:12

## 2024-12-16 RX ADMIN — APREPITANT 130 MG: 130 INJECTION, EMULSION INTRAVENOUS at 02:12

## 2024-12-16 NOTE — PLAN OF CARE
C4 D1 AC given. Tolerated well. Next appts confirmed with pt. Stated uses portal to view next appts. Dc'd home in stable condition.

## 2024-12-17 ENCOUNTER — INFUSION (OUTPATIENT)
Dept: INFUSION THERAPY | Facility: HOSPITAL | Age: 39
End: 2024-12-17
Attending: INTERNAL MEDICINE
Payer: COMMERCIAL

## 2024-12-17 VITALS
BODY MASS INDEX: 20.71 KG/M2 | DIASTOLIC BLOOD PRESSURE: 65 MMHG | WEIGHT: 116.88 LBS | OXYGEN SATURATION: 98 % | HEART RATE: 83 BPM | SYSTOLIC BLOOD PRESSURE: 106 MMHG | TEMPERATURE: 98 F | RESPIRATION RATE: 18 BRPM | HEIGHT: 63 IN

## 2024-12-17 DIAGNOSIS — C50.911 INFILTRATING DUCTAL CARCINOMA OF RIGHT BREAST: ICD-10-CM

## 2024-12-17 DIAGNOSIS — C77.3 MALIGNANT NEOPLASM METASTATIC TO LYMPH NODE OF AXILLA: Primary | ICD-10-CM

## 2024-12-17 PROCEDURE — 63600175 PHARM REV CODE 636 W HCPCS: Mod: JZ,JG | Performed by: NURSE PRACTITIONER

## 2024-12-17 PROCEDURE — 96372 THER/PROPH/DIAG INJ SC/IM: CPT

## 2024-12-17 RX ADMIN — PEGFLILGRASTIM-FPGK 6 MG: 6 INJECTION, SOLUTION SUBCUTANEOUS at 02:12

## 2024-12-17 NOTE — NURSING
C4 D2 Stimufend given, tolerated well. Discharged in stable condition and is aware of future appointments.

## 2024-12-23 ENCOUNTER — PATIENT MESSAGE (OUTPATIENT)
Dept: HEMATOLOGY/ONCOLOGY | Facility: CLINIC | Age: 39
End: 2024-12-23
Payer: COMMERCIAL

## 2024-12-23 DIAGNOSIS — F90.0 ATTENTION DEFICIT HYPERACTIVITY DISORDER (ADHD), PREDOMINANTLY INATTENTIVE TYPE: ICD-10-CM

## 2024-12-26 DIAGNOSIS — C77.3 MALIGNANT NEOPLASM METASTATIC TO LYMPH NODE OF AXILLA: ICD-10-CM

## 2024-12-26 DIAGNOSIS — G43.109 MIGRAINE WITH AURA AND WITHOUT STATUS MIGRAINOSUS, NOT INTRACTABLE: ICD-10-CM

## 2024-12-26 DIAGNOSIS — C50.911 INFILTRATING DUCTAL CARCINOMA OF RIGHT BREAST: ICD-10-CM

## 2024-12-26 RX ORDER — DEXTROAMPHETAMINE SACCHARATE, AMPHETAMINE ASPARTATE, DEXTROAMPHETAMINE SULFATE AND AMPHETAMINE SULFATE 5; 5; 5; 5 MG/1; MG/1; MG/1; MG/1
1 TABLET ORAL 3 TIMES DAILY
Qty: 90 TABLET | Refills: 0 | Status: SHIPPED | OUTPATIENT
Start: 2024-12-26

## 2024-12-26 RX ORDER — PROCHLORPERAZINE MALEATE 10 MG
TABLET ORAL
Qty: 30 TABLET | Refills: 1 | Status: SHIPPED | OUTPATIENT
Start: 2024-12-26

## 2024-12-27 ENCOUNTER — LAB VISIT (OUTPATIENT)
Dept: LAB | Facility: HOSPITAL | Age: 39
End: 2024-12-27
Attending: INTERNAL MEDICINE
Payer: COMMERCIAL

## 2024-12-27 DIAGNOSIS — C50.911 INFILTRATING DUCTAL CARCINOMA OF RIGHT BREAST: ICD-10-CM

## 2024-12-27 DIAGNOSIS — C77.3 MALIGNANT NEOPLASM METASTATIC TO LYMPH NODE OF AXILLA: ICD-10-CM

## 2024-12-27 DIAGNOSIS — K59.00 CONSTIPATION, UNSPECIFIED CONSTIPATION TYPE: ICD-10-CM

## 2024-12-27 DIAGNOSIS — Z79.899 ON ANTINEOPLASTIC CHEMOTHERAPY: ICD-10-CM

## 2024-12-27 LAB
ALBUMIN SERPL-MCNC: 3.6 G/DL (ref 3.5–5)
ALBUMIN/GLOB SERPL: 1.2 RATIO (ref 1.1–2)
ALP SERPL-CCNC: 85 UNIT/L (ref 40–150)
ALT SERPL-CCNC: 12 UNIT/L (ref 0–55)
ANION GAP SERPL CALC-SCNC: 7 MEQ/L
AST SERPL-CCNC: 24 UNIT/L (ref 5–34)
B-HCG UR QL: NEGATIVE
BASOPHILS # BLD AUTO: 0.01 X10(3)/MCL
BASOPHILS NFR BLD AUTO: 0.1 %
BILIRUB SERPL-MCNC: 0.2 MG/DL
BUN SERPL-MCNC: 9.8 MG/DL (ref 7–18.7)
CALCIUM SERPL-MCNC: 9.3 MG/DL (ref 8.4–10.2)
CHLORIDE SERPL-SCNC: 107 MMOL/L (ref 98–107)
CO2 SERPL-SCNC: 24 MMOL/L (ref 22–29)
CREAT SERPL-MCNC: 0.67 MG/DL (ref 0.55–1.02)
CREAT/UREA NIT SERPL: 15
EOSINOPHIL # BLD AUTO: 0.03 X10(3)/MCL (ref 0–0.9)
EOSINOPHIL NFR BLD AUTO: 0.3 %
ERYTHROCYTE [DISTWIDTH] IN BLOOD BY AUTOMATED COUNT: 15.2 % (ref 11.5–17)
GFR SERPLBLD CREATININE-BSD FMLA CKD-EPI: >60 ML/MIN/1.73/M2
GLOBULIN SER-MCNC: 2.9 GM/DL (ref 2.4–3.5)
GLUCOSE SERPL-MCNC: 96 MG/DL (ref 74–100)
HCT VFR BLD AUTO: 32.7 % (ref 37–47)
HGB BLD-MCNC: 11 G/DL (ref 12–16)
IMM GRANULOCYTES # BLD AUTO: 2.45 X10(3)/MCL (ref 0–0.04)
IMM GRANULOCYTES NFR BLD AUTO: 21.5 %
LYMPHOCYTES # BLD AUTO: 1.26 X10(3)/MCL (ref 0.6–4.6)
LYMPHOCYTES NFR BLD AUTO: 11 %
MAGNESIUM SERPL-MCNC: 1.9 MG/DL (ref 1.6–2.6)
MCH RBC QN AUTO: 30.9 PG (ref 27–31)
MCHC RBC AUTO-ENTMCNC: 33.6 G/DL (ref 33–36)
MCV RBC AUTO: 91.9 FL (ref 80–94)
MONOCYTES # BLD AUTO: 1.05 X10(3)/MCL (ref 0.1–1.3)
MONOCYTES NFR BLD AUTO: 9.2 %
NEUTROPHILS # BLD AUTO: 6.61 X10(3)/MCL (ref 2.1–9.2)
NEUTROPHILS NFR BLD AUTO: 57.9 %
PLATELET # BLD AUTO: 196 X10(3)/MCL (ref 130–400)
PMV BLD AUTO: 8.4 FL (ref 7.4–10.4)
POTASSIUM SERPL-SCNC: 3.8 MMOL/L (ref 3.5–5.1)
PROT SERPL-MCNC: 6.5 GM/DL (ref 6.4–8.3)
RBC # BLD AUTO: 3.56 X10(6)/MCL (ref 4.2–5.4)
SODIUM SERPL-SCNC: 138 MMOL/L (ref 136–145)
WBC # BLD AUTO: 11.41 X10(3)/MCL (ref 4.5–11.5)

## 2024-12-27 PROCEDURE — 36415 COLL VENOUS BLD VENIPUNCTURE: CPT

## 2024-12-27 PROCEDURE — 85025 COMPLETE CBC W/AUTO DIFF WBC: CPT

## 2024-12-27 PROCEDURE — 83735 ASSAY OF MAGNESIUM: CPT

## 2024-12-27 PROCEDURE — 80053 COMPREHEN METABOLIC PANEL: CPT

## 2024-12-27 PROCEDURE — 81025 URINE PREGNANCY TEST: CPT

## 2024-12-27 RX ORDER — SODIUM CHLORIDE 0.9 % (FLUSH) 0.9 %
10 SYRINGE (ML) INJECTION
OUTPATIENT
Start: 2024-12-30

## 2024-12-27 RX ORDER — EPINEPHRINE 0.3 MG/.3ML
0.3 INJECTION SUBCUTANEOUS ONCE AS NEEDED
OUTPATIENT
Start: 2024-12-30

## 2024-12-27 RX ORDER — FAMOTIDINE 10 MG/ML
20 INJECTION INTRAVENOUS
OUTPATIENT
Start: 2024-12-30

## 2024-12-27 RX ORDER — HEPARIN 100 UNIT/ML
500 SYRINGE INTRAVENOUS
OUTPATIENT
Start: 2024-12-30

## 2024-12-27 RX ORDER — RIMEGEPANT SULFATE 75 MG/75MG
TABLET, ORALLY DISINTEGRATING ORAL
Qty: 16 TABLET | Refills: 11 | Status: SHIPPED | OUTPATIENT
Start: 2024-12-27

## 2024-12-27 RX ORDER — OLANZAPINE 5 MG/1
TABLET ORAL
Qty: 4 TABLET | Refills: 3 | Status: SHIPPED | OUTPATIENT
Start: 2024-12-27

## 2024-12-27 RX ORDER — DEXAMETHASONE 4 MG/1
TABLET ORAL
Qty: 6 TABLET | Refills: 3 | Status: SHIPPED | OUTPATIENT
Start: 2024-12-27

## 2024-12-27 RX ORDER — DIPHENHYDRAMINE HYDROCHLORIDE 50 MG/ML
50 INJECTION INTRAMUSCULAR; INTRAVENOUS ONCE AS NEEDED
OUTPATIENT
Start: 2024-12-30

## 2024-12-30 ENCOUNTER — INFUSION (OUTPATIENT)
Dept: INFUSION THERAPY | Facility: HOSPITAL | Age: 39
End: 2024-12-30
Attending: INTERNAL MEDICINE
Payer: COMMERCIAL

## 2024-12-30 VITALS
SYSTOLIC BLOOD PRESSURE: 117 MMHG | OXYGEN SATURATION: 100 % | HEIGHT: 63 IN | RESPIRATION RATE: 18 BRPM | BODY MASS INDEX: 21.24 KG/M2 | DIASTOLIC BLOOD PRESSURE: 81 MMHG | TEMPERATURE: 98 F | WEIGHT: 119.88 LBS | HEART RATE: 77 BPM

## 2024-12-30 DIAGNOSIS — C50.911 INFILTRATING DUCTAL CARCINOMA OF RIGHT BREAST: ICD-10-CM

## 2024-12-30 DIAGNOSIS — C77.3 MALIGNANT NEOPLASM METASTATIC TO LYMPH NODE OF AXILLA: Primary | ICD-10-CM

## 2024-12-30 PROCEDURE — 96367 TX/PROPH/DG ADDL SEQ IV INF: CPT

## 2024-12-30 PROCEDURE — 25000003 PHARM REV CODE 250: Performed by: NURSE PRACTITIONER

## 2024-12-30 PROCEDURE — 96413 CHEMO IV INFUSION 1 HR: CPT

## 2024-12-30 PROCEDURE — 96375 TX/PRO/DX INJ NEW DRUG ADDON: CPT

## 2024-12-30 PROCEDURE — A4216 STERILE WATER/SALINE, 10 ML: HCPCS | Performed by: NURSE PRACTITIONER

## 2024-12-30 PROCEDURE — 63600175 PHARM REV CODE 636 W HCPCS: Performed by: NURSE PRACTITIONER

## 2024-12-30 RX ORDER — FAMOTIDINE 10 MG/ML
20 INJECTION INTRAVENOUS
Status: COMPLETED | OUTPATIENT
Start: 2024-12-30 | End: 2024-12-30

## 2024-12-30 RX ORDER — HEPARIN 100 UNIT/ML
500 SYRINGE INTRAVENOUS
Status: DISCONTINUED | OUTPATIENT
Start: 2024-12-30 | End: 2024-12-30 | Stop reason: HOSPADM

## 2024-12-30 RX ORDER — SODIUM CHLORIDE 0.9 % (FLUSH) 0.9 %
10 SYRINGE (ML) INJECTION
Status: DISCONTINUED | OUTPATIENT
Start: 2024-12-30 | End: 2024-12-30 | Stop reason: HOSPADM

## 2024-12-30 RX ORDER — DIPHENHYDRAMINE HYDROCHLORIDE 50 MG/ML
50 INJECTION INTRAMUSCULAR; INTRAVENOUS ONCE AS NEEDED
Status: DISCONTINUED | OUTPATIENT
Start: 2024-12-30 | End: 2024-12-30 | Stop reason: HOSPADM

## 2024-12-30 RX ORDER — EPINEPHRINE 0.3 MG/.3ML
0.3 INJECTION SUBCUTANEOUS ONCE AS NEEDED
Status: DISCONTINUED | OUTPATIENT
Start: 2024-12-30 | End: 2024-12-30 | Stop reason: HOSPADM

## 2024-12-30 RX ADMIN — SODIUM CHLORIDE 10 MG: 9 INJECTION, SOLUTION INTRAVENOUS at 01:12

## 2024-12-30 RX ADMIN — PACLITAXEL 120 MG: 6 INJECTION, SOLUTION INTRAVENOUS at 02:12

## 2024-12-30 RX ADMIN — SODIUM CHLORIDE: 9 INJECTION, SOLUTION INTRAVENOUS at 01:12

## 2024-12-30 RX ADMIN — Medication 10 ML: at 03:12

## 2024-12-30 RX ADMIN — HEPARIN 500 UNITS: 100 SYRINGE at 03:12

## 2024-12-30 RX ADMIN — SODIUM CHLORIDE 50 MG: 9 INJECTION, SOLUTION INTRAVENOUS at 01:12

## 2024-12-30 RX ADMIN — FAMOTIDINE 20 MG: 10 INJECTION, SOLUTION INTRAVENOUS at 01:12

## 2025-01-03 ENCOUNTER — OFFICE VISIT (OUTPATIENT)
Dept: HEMATOLOGY/ONCOLOGY | Facility: CLINIC | Age: 40
End: 2025-01-03
Payer: COMMERCIAL

## 2025-01-03 ENCOUNTER — LAB VISIT (OUTPATIENT)
Dept: LAB | Facility: HOSPITAL | Age: 40
End: 2025-01-03
Attending: INTERNAL MEDICINE
Payer: COMMERCIAL

## 2025-01-03 VITALS
OXYGEN SATURATION: 99 % | BODY MASS INDEX: 21.35 KG/M2 | HEART RATE: 87 BPM | SYSTOLIC BLOOD PRESSURE: 102 MMHG | DIASTOLIC BLOOD PRESSURE: 68 MMHG | TEMPERATURE: 98 F | RESPIRATION RATE: 18 BRPM | WEIGHT: 116 LBS | HEIGHT: 62 IN

## 2025-01-03 DIAGNOSIS — C77.3 MALIGNANT NEOPLASM METASTATIC TO LYMPH NODE OF AXILLA: ICD-10-CM

## 2025-01-03 DIAGNOSIS — Z79.899 ON ANTINEOPLASTIC CHEMOTHERAPY: ICD-10-CM

## 2025-01-03 DIAGNOSIS — C50.911 INFILTRATING DUCTAL CARCINOMA OF RIGHT BREAST: ICD-10-CM

## 2025-01-03 DIAGNOSIS — C50.911 INFILTRATING DUCTAL CARCINOMA OF RIGHT BREAST: Primary | ICD-10-CM

## 2025-01-03 DIAGNOSIS — D64.81 ANTINEOPLASTIC CHEMOTHERAPY INDUCED ANEMIA: ICD-10-CM

## 2025-01-03 DIAGNOSIS — K59.00 CONSTIPATION, UNSPECIFIED CONSTIPATION TYPE: ICD-10-CM

## 2025-01-03 DIAGNOSIS — T45.1X5A ANTINEOPLASTIC CHEMOTHERAPY INDUCED ANEMIA: ICD-10-CM

## 2025-01-03 LAB
ALBUMIN SERPL-MCNC: 3.7 G/DL (ref 3.5–5)
ALBUMIN/GLOB SERPL: 1.3 RATIO (ref 1.1–2)
ALP SERPL-CCNC: 62 UNIT/L (ref 40–150)
ALT SERPL-CCNC: 36 UNIT/L (ref 0–55)
ANION GAP SERPL CALC-SCNC: 9 MEQ/L
AST SERPL-CCNC: 41 UNIT/L (ref 5–34)
B-HCG UR QL: NEGATIVE
BASOPHILS # BLD AUTO: 0.03 X10(3)/MCL
BASOPHILS NFR BLD AUTO: 1.2 %
BILIRUB SERPL-MCNC: 0.3 MG/DL
BUN SERPL-MCNC: 11.5 MG/DL (ref 7–18.7)
CALCIUM SERPL-MCNC: 9.2 MG/DL (ref 8.4–10.2)
CHLORIDE SERPL-SCNC: 105 MMOL/L (ref 98–107)
CO2 SERPL-SCNC: 25 MMOL/L (ref 22–29)
CREAT SERPL-MCNC: 0.71 MG/DL (ref 0.55–1.02)
CREAT/UREA NIT SERPL: 16
EOSINOPHIL # BLD AUTO: 0.02 X10(3)/MCL (ref 0–0.9)
EOSINOPHIL NFR BLD AUTO: 0.8 %
ERYTHROCYTE [DISTWIDTH] IN BLOOD BY AUTOMATED COUNT: 15.4 % (ref 11.5–17)
GFR SERPLBLD CREATININE-BSD FMLA CKD-EPI: >60 ML/MIN/1.73/M2
GLOBULIN SER-MCNC: 2.9 GM/DL (ref 2.4–3.5)
GLUCOSE SERPL-MCNC: 86 MG/DL (ref 74–100)
HCT VFR BLD AUTO: 32.1 % (ref 37–47)
HGB BLD-MCNC: 11.2 G/DL (ref 12–16)
IMM GRANULOCYTES # BLD AUTO: 0.03 X10(3)/MCL (ref 0–0.04)
IMM GRANULOCYTES NFR BLD AUTO: 1.2 %
LYMPHOCYTES # BLD AUTO: 0.59 X10(3)/MCL (ref 0.6–4.6)
LYMPHOCYTES NFR BLD AUTO: 23 %
MAGNESIUM SERPL-MCNC: 1.9 MG/DL (ref 1.6–2.6)
MCH RBC QN AUTO: 31.7 PG (ref 27–31)
MCHC RBC AUTO-ENTMCNC: 34.9 G/DL (ref 33–36)
MCV RBC AUTO: 90.9 FL (ref 80–94)
MONOCYTES # BLD AUTO: 0.21 X10(3)/MCL (ref 0.1–1.3)
MONOCYTES NFR BLD AUTO: 8.2 %
NEUTROPHILS # BLD AUTO: 1.69 X10(3)/MCL (ref 2.1–9.2)
NEUTROPHILS NFR BLD AUTO: 65.6 %
PLATELET # BLD AUTO: 209 X10(3)/MCL (ref 130–400)
PMV BLD AUTO: 8.8 FL (ref 7.4–10.4)
POTASSIUM SERPL-SCNC: 3.9 MMOL/L (ref 3.5–5.1)
PROT SERPL-MCNC: 6.6 GM/DL (ref 6.4–8.3)
RBC # BLD AUTO: 3.53 X10(6)/MCL (ref 4.2–5.4)
SODIUM SERPL-SCNC: 139 MMOL/L (ref 136–145)
WBC # BLD AUTO: 2.57 X10(3)/MCL (ref 4.5–11.5)

## 2025-01-03 PROCEDURE — 81025 URINE PREGNANCY TEST: CPT

## 2025-01-03 PROCEDURE — 85025 COMPLETE CBC W/AUTO DIFF WBC: CPT

## 2025-01-03 PROCEDURE — 80053 COMPREHEN METABOLIC PANEL: CPT

## 2025-01-03 PROCEDURE — 99999 PR PBB SHADOW E&M-EST. PATIENT-LVL IV: CPT | Mod: PBBFAC,,, | Performed by: NURSE PRACTITIONER

## 2025-01-03 PROCEDURE — 83735 ASSAY OF MAGNESIUM: CPT

## 2025-01-03 PROCEDURE — 36415 COLL VENOUS BLD VENIPUNCTURE: CPT

## 2025-01-03 RX ORDER — SODIUM CHLORIDE 0.9 % (FLUSH) 0.9 %
10 SYRINGE (ML) INJECTION
OUTPATIENT
Start: 2025-01-06

## 2025-01-03 RX ORDER — EPINEPHRINE 0.3 MG/.3ML
0.3 INJECTION SUBCUTANEOUS ONCE AS NEEDED
OUTPATIENT
Start: 2025-01-06

## 2025-01-03 RX ORDER — FAMOTIDINE 10 MG/ML
20 INJECTION INTRAVENOUS
OUTPATIENT
Start: 2025-01-06

## 2025-01-03 RX ORDER — HEPARIN 100 UNIT/ML
500 SYRINGE INTRAVENOUS
OUTPATIENT
Start: 2025-01-06

## 2025-01-03 RX ORDER — DIPHENHYDRAMINE HYDROCHLORIDE 50 MG/ML
50 INJECTION INTRAMUSCULAR; INTRAVENOUS ONCE AS NEEDED
OUTPATIENT
Start: 2025-01-06

## 2025-01-03 NOTE — PROGRESS NOTES
Subjective:       Patient ID: Joo Haywood is a 39 y.o. female.    Chief Complaint: Follow-up (Patient has no concerns today)      Diagnosis:  pT2, N1a, M0 stage II invasive ductal carcinoma, grade 3, ER 89.9% positive, MS 32.6% positive, HER2 Tyshawn negative by IHC (0) with a Ki-67 of 81.8%.    Current Treatment:   Adjuvant chemotherapy with dose dense AC followed by weekly Taxol.  Started on 10/28/2024.  Patient will need adjuvant radiation due to lymph node involvement and lumpectomy.  Patient will need adjuvant endocrine therapy, due to bonilla involvement would recommend ovarian suppression and aromatase inhibitor for 10 years    Treatment History:  Right-sided lumpectomy and sentinel lymph node on 08/09/2024    HPI:  39-year-old female who palpated a breast mass in January 2024.  After undergoing some insurance changes, she underwent  screening digital mammogram with tomosynthesis on 06/13/2024 at [facility].  This showed a subtle 2.0 cm round focal asymmetry in the right breast at the 12 o'clock position.  This corresponded to a lump that the patient had palpated.  This was BI-RADS 0, needs additional assessment.  Patient then underwent bilateral breast ultrasound on 06/26/2024, this revealed a mostly round hypoechoic mass with a microlobulated margin and heterogeneous texture located in the right breast at the 11 o'clock position 4 cm from the nipple measuring 2.1 x 1.8 x 2.2 cm.  This was suspicious for malignancy.  There were no other masses or other findings that were suspicious.  The right axilla showed nonspecific benign-appearing lymph nodes without evidence of malignancy.  This was read as BI-RADS 4, biopsy recommended.  Patient underwent a biopsy on 07/01/2024, pathology revealed invasive ductal carcinoma, grade 3.  Breast panel showed ER 89.9% positive, MS 32.6% positive, HER2 negative by IHC (0) with a Ki-67 of 81.8%.  The patient was then seen by Dr. Candelario Amin on  07/11/2024, plan was for right-sided lumpectomy and sentinel lymph node.  She had a breast MRI on 07/18/2024 that showed a 2.8 x 2.4 x 2.3 cm round enhancing mass with microlobulated and spiculated margins located in the right breast at the 12 o'clock position 4 cm from the nipple, this was consistent with patient's biopsy-proven invasive ductal carcinoma.  There were no lymph nodes in the right axilla or in the internal mammary chain that were suspicious.  The patient then underwent a right breast lumpectomy with sentinel lymph node on 08/09/2024.  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  Superior margin reexcision was done and showed benign tissue.  There were 8 lymph nodes removed, 1 was positive for metastatic invasive ductal carcinoma.  Final pathologic stage was pT2, N1a, M0 stage II.  I initially saw the patient on 09/04/2024. Oncotype DX returned with a score of 44, greater than 12% chance of distant recurrence at 9 years with an AI or tamoxifen alone, and chemotherapy benefit.     CT chest, abdomen, and pelvis done on 10/02/2024 showed no evidence of metastatic disease in the chest, abdomen, or pelvis.    Echocardiogram done on 10/10/2024 showed an EF of 60-65%.    Adjuvant chemotherapy with dose dense AC started on 10/28/2024.     Interval History:   Patient presents to clinic for scheduled follow up appointment and treatment visit.  She is currently being treated with weekly taxol.  She is tolerating treatment pretty well thus far.  She has been able to manage her constipation well lately, no particular issues with nausea.  She denies any pain, shortness of breath, neuropathy.    Past Medical History:   Diagnosis Date    ADHD (attention deficit hyperactivity disorder)     Migraine       Past Surgical History:   Procedure Laterality Date    CERVICAL CONIZATION   W/ LASER  2013    FESS, USING COMPUTER-ASSISTED NAVIGATION, WITH NASAL TURBINATE REDUCTION  2021    Dr. Stevens     FUNCTIONAL ENDOSCOPIC SINUS SURGERY (FESS)      MEDIPORT INSERTION, SINGLE Left 10/18/2024    US GUIDED BIOPSY BREAST RIGHT Right 07/01/2024    [facility] right breast lumpectomy with right axillary sentinel node biopsy Right 08/09/2024     Social History     Socioeconomic History    Marital status: Significant Other   Tobacco Use    Smoking status: Never     Passive exposure: Never    Smokeless tobacco: Never   Substance and Sexual Activity    Alcohol use: Not Currently    Drug use: Never    Sexual activity: Yes     Partners: Male     Birth control/protection: OCP     Social Drivers of Health     Financial Resource Strain: Low Risk  (12/9/2024)    Overall Financial Resource Strain (CARDIA)     Difficulty of Paying Living Expenses: Not very hard   Food Insecurity: No Food Insecurity (12/9/2024)    Hunger Vital Sign     Worried About Running Out of Food in the Last Year: Never true     Ran Out of Food in the Last Year: Never true   Transportation Needs: No Transportation Needs (2/6/2024)    PRAPARE - Transportation     Lack of Transportation (Medical): No     Lack of Transportation (Non-Medical): No   Physical Activity: Inactive (12/9/2024)    Exercise Vital Sign     Days of Exercise per Week: 0 days     Minutes of Exercise per Session: 0 min   Stress: Stress Concern Present (12/9/2024)    [employer] - Occupational Stress Questionnaire     Feeling of Stress : To some extent   Housing Stability: Low Risk  (2/6/2024)    Housing Stability Vital Sign     Unable to Pay for Housing in the Last Year: No     Number of Places Lived in the Last Year: 1     Unstable Housing in the Last Year: No      Family History   Problem Relation Name Age of Onset    Ulcerative colitis Mother      Heart disease Father Anthony Aguayot     Alcohol abuse Paternal Grandmother Briana edgarucet       Review of patient's allergies indicates:  No Known Allergies   Review of Systems   Constitutional:  Negative for appetite change  and unexpected weight change.   HENT:  Negative for mouth sores.    Respiratory:  Negative for cough and shortness of breath.    Cardiovascular:  Negative for chest pain.   Gastrointestinal:  Negative for abdominal pain and diarrhea.   Genitourinary:  Negative for frequency.   Musculoskeletal:  Positive for back pain.   Integumentary:  Negative for rash.   Hematological:  Negative for adenopathy.   Psychiatric/Behavioral:  The patient is nervous/anxious.          Objective:      Physical Exam  Vitals reviewed.   Constitutional:       General: She is not in acute distress.     Appearance: Normal appearance.   HENT:      Head: Normocephalic and atraumatic.      Nose: Nose normal.      Mouth/Throat:      Mouth: Mucous membranes are moist.   Eyes:      Extraocular Movements: Extraocular movements intact.      Conjunctiva/sclera: Conjunctivae normal.   Cardiovascular:      Rate and Rhythm: Normal rate and regular rhythm.      Pulses: Normal pulses.      Heart sounds: Normal heart sounds.   Pulmonary:      Effort: Pulmonary effort is normal.      Breath sounds: Normal breath sounds.   Musculoskeletal:         General: No swelling. Normal range of motion.      Cervical back: Normal range of motion and neck supple.      Right lower leg: No edema.      Left lower leg: No edema.   Skin:     General: Skin is warm and dry.   Neurological:      General: No focal deficit present.      Mental Status: She is alert and oriented to person, place, and time. Mental status is at baseline.   Psychiatric:         Mood and Affect: Mood normal.         Behavior: Behavior normal.         LABS AND IMAGING REVIEWED IN EPIC          Assessment:   pT2, N1a, M0 stage II invasive ductal carcinoma, grade 3, ER 89.9% positive, MD 32.6% positive, HER2 Tyshawn negative by IHC (0) with a Ki-67 of 81.8%.        Plan:       We had a long conversation about the overall good prognosis.  We discussed Oncotype DX results.  Goal of treatment is cure.    I  recommended dose dense AC followed by weekly Taxol.    She will then need adjuvant radiation.    I explained that due to her hormone receptor positivity, she would need adjuvant endocrine therapy. I would recommend 10 years of therapy due to bonilla involvement. I also discussed the possibility of ovarian suppression + aromatase inhibitor. She did inquire about having a tubal ligation and ablation, but now her plan is to have a bilateral oophorectomy in July 2025.    Discussed fertility, patient is done with child bearing.    Radiation oncology,  Prellop    CT chest, abdomen, and pelvis done on 10/02/2024 showed no evidence of disease.    Echocardiogram done on 10/10/2024 showed an EF of 60-65%.    Started AC in a dose dense fashion every 2 weeks on 10/28/2024.  She has completed her 4 doses in his now receiving weekly Taxol.  Proceed with Taxol Monday.    Anemia mild and stable, continue to monitor.    lactulose for her to use as needed for constipation.  She can use omeprazole, Pepcid, Tums for heartburn.    Okay to proceed with cycle 6 on Monday    Labs and Taxol weekly    She will return to clinic in 3 weeks with labs     HOMER Mulligan

## 2025-01-06 ENCOUNTER — INFUSION (OUTPATIENT)
Dept: INFUSION THERAPY | Facility: HOSPITAL | Age: 40
End: 2025-01-06
Attending: INTERNAL MEDICINE
Payer: COMMERCIAL

## 2025-01-06 ENCOUNTER — PATIENT MESSAGE (OUTPATIENT)
Dept: HEMATOLOGY/ONCOLOGY | Facility: CLINIC | Age: 40
End: 2025-01-06
Payer: COMMERCIAL

## 2025-01-06 VITALS — TEMPERATURE: 97 F | DIASTOLIC BLOOD PRESSURE: 74 MMHG | SYSTOLIC BLOOD PRESSURE: 121 MMHG | HEART RATE: 89 BPM

## 2025-01-06 DIAGNOSIS — C50.911 INFILTRATING DUCTAL CARCINOMA OF RIGHT BREAST: ICD-10-CM

## 2025-01-06 DIAGNOSIS — C77.3 MALIGNANT NEOPLASM METASTATIC TO LYMPH NODE OF AXILLA: Primary | ICD-10-CM

## 2025-01-06 PROCEDURE — 96368 THER/DIAG CONCURRENT INF: CPT

## 2025-01-06 PROCEDURE — 63600175 PHARM REV CODE 636 W HCPCS: Performed by: NURSE PRACTITIONER

## 2025-01-06 PROCEDURE — 96413 CHEMO IV INFUSION 1 HR: CPT

## 2025-01-06 PROCEDURE — 96375 TX/PRO/DX INJ NEW DRUG ADDON: CPT

## 2025-01-06 PROCEDURE — 25000003 PHARM REV CODE 250: Performed by: NURSE PRACTITIONER

## 2025-01-06 RX ORDER — SODIUM CHLORIDE 0.9 % (FLUSH) 0.9 %
10 SYRINGE (ML) INJECTION
Status: DISCONTINUED | OUTPATIENT
Start: 2025-01-06 | End: 2025-01-06 | Stop reason: HOSPADM

## 2025-01-06 RX ORDER — EPINEPHRINE 0.3 MG/.3ML
0.3 INJECTION SUBCUTANEOUS ONCE AS NEEDED
Status: DISCONTINUED | OUTPATIENT
Start: 2025-01-06 | End: 2025-01-06 | Stop reason: HOSPADM

## 2025-01-06 RX ORDER — HEPARIN 100 UNIT/ML
500 SYRINGE INTRAVENOUS
Status: DISCONTINUED | OUTPATIENT
Start: 2025-01-06 | End: 2025-01-06 | Stop reason: HOSPADM

## 2025-01-06 RX ORDER — FAMOTIDINE 10 MG/ML
20 INJECTION INTRAVENOUS
Status: COMPLETED | OUTPATIENT
Start: 2025-01-06 | End: 2025-01-06

## 2025-01-06 RX ORDER — DIPHENHYDRAMINE HYDROCHLORIDE 50 MG/ML
50 INJECTION INTRAMUSCULAR; INTRAVENOUS ONCE AS NEEDED
Status: DISCONTINUED | OUTPATIENT
Start: 2025-01-06 | End: 2025-01-06 | Stop reason: HOSPADM

## 2025-01-06 RX ADMIN — SODIUM CHLORIDE 50 MG: 9 INJECTION, SOLUTION INTRAVENOUS at 01:01

## 2025-01-06 RX ADMIN — PACLITAXEL 120 MG: 6 INJECTION, SOLUTION INTRAVENOUS at 01:01

## 2025-01-06 RX ADMIN — FAMOTIDINE 20 MG: 10 INJECTION, SOLUTION INTRAVENOUS at 01:01

## 2025-01-06 RX ADMIN — SODIUM CHLORIDE 10 MG: 9 INJECTION, SOLUTION INTRAVENOUS at 01:01

## 2025-01-10 ENCOUNTER — LAB VISIT (OUTPATIENT)
Dept: LAB | Facility: HOSPITAL | Age: 40
End: 2025-01-10
Attending: INTERNAL MEDICINE
Payer: COMMERCIAL

## 2025-01-10 DIAGNOSIS — C77.3 MALIGNANT NEOPLASM METASTATIC TO LYMPH NODE OF AXILLA: ICD-10-CM

## 2025-01-10 DIAGNOSIS — Z79.899 ON ANTINEOPLASTIC CHEMOTHERAPY: ICD-10-CM

## 2025-01-10 DIAGNOSIS — C50.911 INFILTRATING DUCTAL CARCINOMA OF RIGHT BREAST: ICD-10-CM

## 2025-01-10 LAB
ALBUMIN SERPL-MCNC: 3.7 G/DL (ref 3.5–5)
ALBUMIN/GLOB SERPL: 1.2 RATIO (ref 1.1–2)
ALP SERPL-CCNC: 63 UNIT/L (ref 40–150)
ALT SERPL-CCNC: 39 UNIT/L (ref 0–55)
ANION GAP SERPL CALC-SCNC: 4 MEQ/L
AST SERPL-CCNC: 40 UNIT/L (ref 5–34)
B-HCG UR QL: NEGATIVE
BASOPHILS # BLD AUTO: 0.01 X10(3)/MCL
BASOPHILS NFR BLD AUTO: 0.2 %
BILIRUB SERPL-MCNC: 0.4 MG/DL
BUN SERPL-MCNC: 8.9 MG/DL (ref 7–18.7)
CALCIUM SERPL-MCNC: 9.5 MG/DL (ref 8.4–10.2)
CHLORIDE SERPL-SCNC: 105 MMOL/L (ref 98–107)
CO2 SERPL-SCNC: 28 MMOL/L (ref 22–29)
CREAT SERPL-MCNC: 0.78 MG/DL (ref 0.55–1.02)
CREAT/UREA NIT SERPL: 11
EOSINOPHIL # BLD AUTO: 0.05 X10(3)/MCL (ref 0–0.9)
EOSINOPHIL NFR BLD AUTO: 0.9 %
ERYTHROCYTE [DISTWIDTH] IN BLOOD BY AUTOMATED COUNT: 15.4 % (ref 11.5–17)
GFR SERPLBLD CREATININE-BSD FMLA CKD-EPI: >60 ML/MIN/1.73/M2
GLOBULIN SER-MCNC: 3.1 GM/DL (ref 2.4–3.5)
GLUCOSE SERPL-MCNC: 80 MG/DL (ref 74–100)
HCT VFR BLD AUTO: 32.4 % (ref 37–47)
HGB BLD-MCNC: 11.2 G/DL (ref 12–16)
IMM GRANULOCYTES # BLD AUTO: 0.02 X10(3)/MCL (ref 0–0.04)
IMM GRANULOCYTES NFR BLD AUTO: 0.4 %
LYMPHOCYTES # BLD AUTO: 0.57 X10(3)/MCL (ref 0.6–4.6)
LYMPHOCYTES NFR BLD AUTO: 10.4 %
MAGNESIUM SERPL-MCNC: 1.9 MG/DL (ref 1.6–2.6)
MCH RBC QN AUTO: 31.9 PG (ref 27–31)
MCHC RBC AUTO-ENTMCNC: 34.6 G/DL (ref 33–36)
MCV RBC AUTO: 92.3 FL (ref 80–94)
MONOCYTES # BLD AUTO: 0.3 X10(3)/MCL (ref 0.1–1.3)
MONOCYTES NFR BLD AUTO: 5.5 %
NEUTROPHILS # BLD AUTO: 4.53 X10(3)/MCL (ref 2.1–9.2)
NEUTROPHILS NFR BLD AUTO: 82.6 %
PLATELET # BLD AUTO: 340 X10(3)/MCL (ref 130–400)
PMV BLD AUTO: 8.1 FL (ref 7.4–10.4)
POTASSIUM SERPL-SCNC: 3.9 MMOL/L (ref 3.5–5.1)
PROT SERPL-MCNC: 6.8 GM/DL (ref 6.4–8.3)
RBC # BLD AUTO: 3.51 X10(6)/MCL (ref 4.2–5.4)
SODIUM SERPL-SCNC: 137 MMOL/L (ref 136–145)
WBC # BLD AUTO: 5.48 X10(3)/MCL (ref 4.5–11.5)

## 2025-01-10 PROCEDURE — 85025 COMPLETE CBC W/AUTO DIFF WBC: CPT

## 2025-01-10 PROCEDURE — 36415 COLL VENOUS BLD VENIPUNCTURE: CPT

## 2025-01-10 PROCEDURE — 80053 COMPREHEN METABOLIC PANEL: CPT

## 2025-01-10 PROCEDURE — 81025 URINE PREGNANCY TEST: CPT

## 2025-01-10 PROCEDURE — 83735 ASSAY OF MAGNESIUM: CPT

## 2025-01-10 RX ORDER — SODIUM CHLORIDE 0.9 % (FLUSH) 0.9 %
10 SYRINGE (ML) INJECTION
OUTPATIENT
Start: 2025-01-13

## 2025-01-10 RX ORDER — HEPARIN 100 UNIT/ML
500 SYRINGE INTRAVENOUS
OUTPATIENT
Start: 2025-01-13

## 2025-01-10 RX ORDER — EPINEPHRINE 0.3 MG/.3ML
0.3 INJECTION SUBCUTANEOUS ONCE AS NEEDED
OUTPATIENT
Start: 2025-01-13

## 2025-01-10 RX ORDER — FAMOTIDINE 10 MG/ML
20 INJECTION INTRAVENOUS
OUTPATIENT
Start: 2025-01-13

## 2025-01-10 RX ORDER — DIPHENHYDRAMINE HYDROCHLORIDE 50 MG/ML
50 INJECTION INTRAMUSCULAR; INTRAVENOUS ONCE AS NEEDED
OUTPATIENT
Start: 2025-01-13

## 2025-01-13 ENCOUNTER — INFUSION (OUTPATIENT)
Dept: INFUSION THERAPY | Facility: HOSPITAL | Age: 40
End: 2025-01-13
Attending: INTERNAL MEDICINE
Payer: COMMERCIAL

## 2025-01-13 VITALS
OXYGEN SATURATION: 100 % | HEIGHT: 62 IN | RESPIRATION RATE: 16 BRPM | BODY MASS INDEX: 21.99 KG/M2 | WEIGHT: 119.5 LBS | TEMPERATURE: 98 F | HEART RATE: 82 BPM | SYSTOLIC BLOOD PRESSURE: 108 MMHG | DIASTOLIC BLOOD PRESSURE: 75 MMHG

## 2025-01-13 DIAGNOSIS — C50.911 INFILTRATING DUCTAL CARCINOMA OF RIGHT BREAST: ICD-10-CM

## 2025-01-13 DIAGNOSIS — C77.3 MALIGNANT NEOPLASM METASTATIC TO LYMPH NODE OF AXILLA: Primary | ICD-10-CM

## 2025-01-13 PROCEDURE — 25000003 PHARM REV CODE 250: Performed by: NURSE PRACTITIONER

## 2025-01-13 PROCEDURE — 96375 TX/PRO/DX INJ NEW DRUG ADDON: CPT

## 2025-01-13 PROCEDURE — 63600175 PHARM REV CODE 636 W HCPCS: Performed by: NURSE PRACTITIONER

## 2025-01-13 PROCEDURE — 96367 TX/PROPH/DG ADDL SEQ IV INF: CPT

## 2025-01-13 PROCEDURE — 96413 CHEMO IV INFUSION 1 HR: CPT

## 2025-01-13 RX ORDER — DIPHENHYDRAMINE HYDROCHLORIDE 50 MG/ML
50 INJECTION INTRAMUSCULAR; INTRAVENOUS ONCE AS NEEDED
Status: DISCONTINUED | OUTPATIENT
Start: 2025-01-13 | End: 2025-01-13 | Stop reason: HOSPADM

## 2025-01-13 RX ORDER — FAMOTIDINE 10 MG/ML
20 INJECTION INTRAVENOUS
Status: COMPLETED | OUTPATIENT
Start: 2025-01-13 | End: 2025-01-13

## 2025-01-13 RX ORDER — EPINEPHRINE 0.3 MG/.3ML
0.3 INJECTION SUBCUTANEOUS ONCE AS NEEDED
Status: DISCONTINUED | OUTPATIENT
Start: 2025-01-13 | End: 2025-01-13 | Stop reason: HOSPADM

## 2025-01-13 RX ORDER — HEPARIN 100 UNIT/ML
500 SYRINGE INTRAVENOUS
Status: DISCONTINUED | OUTPATIENT
Start: 2025-01-13 | End: 2025-01-13 | Stop reason: HOSPADM

## 2025-01-13 RX ORDER — SODIUM CHLORIDE 0.9 % (FLUSH) 0.9 %
10 SYRINGE (ML) INJECTION
Status: DISCONTINUED | OUTPATIENT
Start: 2025-01-13 | End: 2025-01-13 | Stop reason: HOSPADM

## 2025-01-13 RX ADMIN — SODIUM CHLORIDE 10 MG: 9 INJECTION, SOLUTION INTRAVENOUS at 02:01

## 2025-01-13 RX ADMIN — HEPARIN 500 UNITS: 100 SYRINGE at 02:01

## 2025-01-13 RX ADMIN — DIPHENHYDRAMINE HYDROCHLORIDE 50 MG: 50 INJECTION, SOLUTION INTRAMUSCULAR; INTRAVENOUS at 02:01

## 2025-01-13 RX ADMIN — FAMOTIDINE 20 MG: 10 INJECTION, SOLUTION INTRAVENOUS at 02:01

## 2025-01-13 RX ADMIN — PACLITAXEL 120 MG: 6 INJECTION, SOLUTION INTRAVENOUS at 02:01

## 2025-01-16 ENCOUNTER — PATIENT MESSAGE (OUTPATIENT)
Dept: HEMATOLOGY/ONCOLOGY | Facility: CLINIC | Age: 40
End: 2025-01-16
Payer: COMMERCIAL

## 2025-01-17 ENCOUNTER — LAB VISIT (OUTPATIENT)
Dept: LAB | Facility: HOSPITAL | Age: 40
End: 2025-01-17
Attending: INTERNAL MEDICINE
Payer: COMMERCIAL

## 2025-01-17 DIAGNOSIS — Z79.899 ON ANTINEOPLASTIC CHEMOTHERAPY: ICD-10-CM

## 2025-01-17 DIAGNOSIS — C77.3 MALIGNANT NEOPLASM METASTATIC TO LYMPH NODE OF AXILLA: ICD-10-CM

## 2025-01-17 DIAGNOSIS — C50.911 INFILTRATING DUCTAL CARCINOMA OF RIGHT BREAST: ICD-10-CM

## 2025-01-17 LAB
ALBUMIN SERPL-MCNC: 3.8 G/DL (ref 3.5–5)
ALBUMIN/GLOB SERPL: 1.1 RATIO (ref 1.1–2)
ALP SERPL-CCNC: 65 UNIT/L (ref 40–150)
ALT SERPL-CCNC: 30 UNIT/L (ref 0–55)
ANION GAP SERPL CALC-SCNC: 8 MEQ/L
AST SERPL-CCNC: 41 UNIT/L (ref 5–34)
B-HCG UR QL: NEGATIVE
BASOPHILS # BLD AUTO: 0.02 X10(3)/MCL
BASOPHILS NFR BLD AUTO: 0.8 %
BILIRUB SERPL-MCNC: 0.4 MG/DL
BUN SERPL-MCNC: 7.9 MG/DL (ref 7–18.7)
CALCIUM SERPL-MCNC: 9.9 MG/DL (ref 8.4–10.2)
CHLORIDE SERPL-SCNC: 107 MMOL/L (ref 98–107)
CO2 SERPL-SCNC: 28 MMOL/L (ref 22–29)
CREAT SERPL-MCNC: 0.74 MG/DL (ref 0.55–1.02)
CREAT/UREA NIT SERPL: 11
EOSINOPHIL # BLD AUTO: 0.22 X10(3)/MCL (ref 0–0.9)
EOSINOPHIL NFR BLD AUTO: 8.7 %
ERYTHROCYTE [DISTWIDTH] IN BLOOD BY AUTOMATED COUNT: 15.3 % (ref 11.5–17)
GFR SERPLBLD CREATININE-BSD FMLA CKD-EPI: >60 ML/MIN/1.73/M2
GLOBULIN SER-MCNC: 3.4 GM/DL (ref 2.4–3.5)
GLUCOSE SERPL-MCNC: 88 MG/DL (ref 74–100)
HCT VFR BLD AUTO: 33.9 % (ref 37–47)
HGB BLD-MCNC: 11.5 G/DL (ref 12–16)
IMM GRANULOCYTES # BLD AUTO: 0 X10(3)/MCL (ref 0–0.04)
IMM GRANULOCYTES NFR BLD AUTO: 0 %
LYMPHOCYTES # BLD AUTO: 0.66 X10(3)/MCL (ref 0.6–4.6)
LYMPHOCYTES NFR BLD AUTO: 26.2 %
MAGNESIUM SERPL-MCNC: 1.9 MG/DL (ref 1.6–2.6)
MCH RBC QN AUTO: 31.4 PG (ref 27–31)
MCHC RBC AUTO-ENTMCNC: 33.9 G/DL (ref 33–36)
MCV RBC AUTO: 92.6 FL (ref 80–94)
MONOCYTES # BLD AUTO: 0.28 X10(3)/MCL (ref 0.1–1.3)
MONOCYTES NFR BLD AUTO: 11.1 %
NEUTROPHILS # BLD AUTO: 1.34 X10(3)/MCL (ref 2.1–9.2)
NEUTROPHILS NFR BLD AUTO: 53.2 %
PLATELET # BLD AUTO: 249 X10(3)/MCL (ref 130–400)
PMV BLD AUTO: 8.2 FL (ref 7.4–10.4)
POTASSIUM SERPL-SCNC: 3.9 MMOL/L (ref 3.5–5.1)
PROT SERPL-MCNC: 7.2 GM/DL (ref 6.4–8.3)
RBC # BLD AUTO: 3.66 X10(6)/MCL (ref 4.2–5.4)
SODIUM SERPL-SCNC: 143 MMOL/L (ref 136–145)
WBC # BLD AUTO: 2.52 X10(3)/MCL (ref 4.5–11.5)

## 2025-01-17 PROCEDURE — 81025 URINE PREGNANCY TEST: CPT

## 2025-01-17 PROCEDURE — 83735 ASSAY OF MAGNESIUM: CPT

## 2025-01-17 PROCEDURE — 80053 COMPREHEN METABOLIC PANEL: CPT

## 2025-01-17 PROCEDURE — 36415 COLL VENOUS BLD VENIPUNCTURE: CPT

## 2025-01-17 PROCEDURE — 85025 COMPLETE CBC W/AUTO DIFF WBC: CPT

## 2025-01-17 RX ORDER — SODIUM CHLORIDE 0.9 % (FLUSH) 0.9 %
10 SYRINGE (ML) INJECTION
Status: CANCELLED | OUTPATIENT
Start: 2025-01-27

## 2025-01-17 RX ORDER — EPINEPHRINE 0.3 MG/.3ML
0.3 INJECTION SUBCUTANEOUS ONCE AS NEEDED
Status: CANCELLED | OUTPATIENT
Start: 2025-01-27

## 2025-01-17 RX ORDER — HEPARIN 100 UNIT/ML
500 SYRINGE INTRAVENOUS
Status: CANCELLED | OUTPATIENT
Start: 2025-01-27

## 2025-01-17 RX ORDER — DIPHENHYDRAMINE HYDROCHLORIDE 50 MG/ML
50 INJECTION INTRAMUSCULAR; INTRAVENOUS ONCE AS NEEDED
Status: CANCELLED | OUTPATIENT
Start: 2025-01-27

## 2025-01-17 RX ORDER — FAMOTIDINE 10 MG/ML
20 INJECTION INTRAVENOUS
Status: CANCELLED | OUTPATIENT
Start: 2025-01-27

## 2025-01-17 RX ORDER — DEXAMETHASONE SODIUM PHOSPHATE 10 MG/ML
10 INJECTION INTRAMUSCULAR; INTRAVENOUS
Status: CANCELLED | OUTPATIENT
Start: 2025-01-27

## 2025-01-21 DIAGNOSIS — F41.1 ANXIETY STATE: ICD-10-CM

## 2025-01-23 DIAGNOSIS — F90.0 ATTENTION DEFICIT HYPERACTIVITY DISORDER (ADHD), PREDOMINANTLY INATTENTIVE TYPE: ICD-10-CM

## 2025-01-23 NOTE — PROGRESS NOTES
Subjective:       Patient ID: Joo Haywood is a 39 y.o. female.    Chief Complaint: No chief complaint on file.      Diagnosis:  pT2, N1a, M0 stage II invasive ductal carcinoma, grade 3, ER 89.9% positive, NC 32.6% positive, HER2 Tyshawn negative by IHC (0) with a Ki-67 of 81.8%.    Current Treatment:   Adjuvant chemotherapy with dose dense AC followed by weekly Taxol.  Started on 10/28/2024.  Patient will need adjuvant radiation due to lymph node involvement and lumpectomy.  Patient will need adjuvant endocrine therapy, due to bonilla involvement would recommend ovarian suppression and aromatase inhibitor for 10 years    Treatment History:  Right-sided lumpectomy and sentinel lymph node on 08/09/2024    HPI:  39-year-old female who palpated a breast mass in January 2024.  After undergoing some insurance changes, she underwent  screening digital mammogram with tomosynthesis on 06/13/2024 at breast Center [facility].  This showed a subtle 2.0 cm round focal asymmetry in the right breast at the 12 o'clock position.  This corresponded to a lump that the patient had palpated.  This was BI-RADS 0, needs additional assessment.  Patient then underwent bilateral breast ultrasound on 06/26/2024, this revealed a mostly round hypoechoic mass with a microlobulated margin and heterogeneous texture located in the right breast at the 11 o'clock position 4 cm from the nipple measuring 2.1 x 1.8 x 2.2 cm.  This was suspicious for malignancy.  There were no other masses or other findings that were suspicious.  The right axilla showed nonspecific benign-appearing lymph nodes without evidence of malignancy.  This was read as BI-RADS 4, biopsy recommended.  Patient underwent a biopsy on 07/01/2024, pathology revealed invasive ductal carcinoma, grade 3.  Breast panel showed ER 89.9% positive, NC 32.6% positive, HER2 negative by IHC (0) with a Ki-67 of 81.8%.  The patient was then seen by Dr. Candelario Amin on 07/11/2024, plan was for  right-sided lumpectomy and sentinel lymph node.  She had a breast MRI on 07/18/2024 that showed a 2.8 x 2.4 x 2.3 cm round enhancing mass with microlobulated and spiculated margins located in the right breast at the 12 o'clock position 4 cm from the nipple, this was consistent with patient's biopsy-proven invasive ductal carcinoma.  There were no lymph nodes in the right axilla or in the internal mammary chain that were suspicious.  The patient then underwent a right breast lumpectomy with sentinel lymph node on 08/09/2024.  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  Superior margin reexcision was done and showed benign tissue.  There were 8 lymph nodes removed, 1 was positive for metastatic invasive ductal carcinoma.  Final pathologic stage was pT2, N1a, M0 stage II.  I initially saw the patient on 09/04/2024. Oncotype DX returned with a score of 44, greater than 12% chance of distant recurrence at 9 years with an AI or tamoxifen alone, and chemotherapy benefit.     CT chest, abdomen, and pelvis done on 10/02/2024 showed no evidence of metastatic disease in the chest, abdomen, or pelvis.    Echocardiogram done on 10/10/2024 showed an EF of 60-65%.    Adjuvant chemotherapy with dose dense AC started on 10/28/2024.     Interval History:   Patient presents to clinic for scheduled follow up appointment and treatment visit.  She is currently being treated with weekly taxol.  She is tolerating treatment well.  No new complaints.  Constipation remains manageable.  She denies any pain, nausea, shortness of breath, neuropathy.    Past Medical History:   Diagnosis Date    ADHD (attention deficit hyperactivity disorder)     Migraine       Past Surgical History:   Procedure Laterality Date    CERVICAL CONIZATION   W/ LASER  2013    FESS, USING COMPUTER-ASSISTED NAVIGATION, WITH NASAL TURBINATE REDUCTION  2021    Dr. Stevens    FUNCTIONAL ENDOSCOPIC SINUS SURGERY (FESS)      MEDIPORT INSERTION, SINGLE Left  10/18/2024    US GUIDED BIOPSY BREAST RIGHT Right 07/01/2024    [facility]G right breast lumpectomy with right axillary sentinel node biopsy Right 08/09/2024     Social History     Socioeconomic History    Marital status: Significant Other   Tobacco Use    Smoking status: Never     Passive exposure: Never    Smokeless tobacco: Never   Substance and Sexual Activity    Alcohol use: Not Currently    Drug use: Never    Sexual activity: Yes     Partners: Male     Birth control/protection: OCP     Social Drivers of Health     Financial Resource Strain: Low Risk  (12/9/2024)    Overall Financial Resource Strain (CARDIA)     Difficulty of Paying Living Expenses: Not very hard   Food Insecurity: No Food Insecurity (12/9/2024)    Hunger Vital Sign     Worried About Running Out of Food in the Last Year: Never true     Ran Out of Food in the Last Year: Never true   Transportation Needs: No Transportation Needs (2/6/2024)    PRAPARE - Transportation     Lack of Transportation (Medical): No     Lack of Transportation (Non-Medical): No   Physical Activity: Inactive (12/9/2024)    Exercise Vital Sign     Days of Exercise per Week: 0 days     Minutes of Exercise per Session: 0 min   Stress: Stress Concern Present (12/9/2024)    [employer] [place] [employer] - Occupational Stress Questionnaire     Feeling of Stress : To some extent   Housing Stability: Low Risk  (2/6/2024)    Housing Stability Vital Sign     Unable to Pay for Housing in the Last Year: No     Number of Places Lived in the Last Year: 1     Unstable Housing in the Last Year: No      Family History   Problem Relation Name Age of Onset    Ulcerative colitis Mother      Heart disease Father Anthony Clemente     Alcohol abuse Paternal Grandmother Briana clemente       Review of patient's allergies indicates:  No Known Allergies   Review of Systems   Constitutional:  Negative for appetite change and unexpected weight change.   HENT:  Negative for mouth sores.    Respiratory:   Negative for cough and shortness of breath.    Cardiovascular:  Negative for chest pain.   Gastrointestinal:  Negative for abdominal pain and diarrhea.   Genitourinary:  Negative for frequency.   Integumentary:  Negative for rash.   Hematological:  Negative for adenopathy.   Psychiatric/Behavioral:  The patient is nervous/anxious.          Objective:      Physical Exam  Vitals reviewed.   Constitutional:       General: She is not in acute distress.     Appearance: Normal appearance.   HENT:      Head: Normocephalic and atraumatic.      Nose: Nose normal.      Mouth/Throat:      Mouth: Mucous membranes are moist.   Eyes:      Extraocular Movements: Extraocular movements intact.      Conjunctiva/sclera: Conjunctivae normal.   Cardiovascular:      Rate and Rhythm: Normal rate and regular rhythm.      Pulses: Normal pulses.      Heart sounds: Normal heart sounds.   Pulmonary:      Effort: Pulmonary effort is normal.      Breath sounds: Normal breath sounds.   Musculoskeletal:         General: No swelling. Normal range of motion.      Cervical back: Normal range of motion and neck supple.      Right lower leg: No edema.      Left lower leg: No edema.   Skin:     General: Skin is warm and dry.   Neurological:      General: No focal deficit present.      Mental Status: She is alert and oriented to person, place, and time. Mental status is at baseline.   Psychiatric:         Mood and Affect: Mood normal.         Behavior: Behavior normal.         LABS AND IMAGING REVIEWED IN EPIC          Assessment:   pT2, N1a, M0 stage II invasive ductal carcinoma, grade 3, ER 89.9% positive, TX 32.6% positive, HER2 Tyshawn negative by IHC (0) with a Ki-67 of 81.8%.        Plan:       We had a long conversation about the overall good prognosis.  We discussed Oncotype DX results.  Goal of treatment is cure.    I recommended dose dense AC followed by weekly Taxol.    She will then need adjuvant radiation.    I explained that due to her hormone  receptor positivity, she would need adjuvant endocrine therapy. I would recommend 10 years of therapy due to bonilla involvement. I also discussed the possibility of ovarian suppression + aromatase inhibitor. She did inquire about having a tubal ligation and ablation, but now her plan is to have a bilateral oophorectomy in July 2025.    Discussed fertility, patient is done with child bearing.    Radiation oncology,  Preleloop    CT chest, abdomen, and pelvis done on 10/02/2024 showed no evidence of disease.    Echocardiogram done on 10/10/2024 showed an EF of 60-65%.    Started AC in a dose dense fashion every 2 weeks on 10/28/2024.  She has completed her 4 doses and is now receiving weekly Taxol.      Anemia mild and stable, continue to monitor.    lactulose for her to use as needed for constipation.    She can use omeprazole, Pepcid, Tums for heartburn.    Cycle 8 delayed due to weather. Okay to proceed with treatment next week    Labs and Taxol weekly    She will return to clinic in 3-4 weeks with labs     HOMER Mulligan

## 2025-01-24 ENCOUNTER — LAB VISIT (OUTPATIENT)
Dept: LAB | Facility: HOSPITAL | Age: 40
End: 2025-01-24
Attending: INTERNAL MEDICINE
Payer: COMMERCIAL

## 2025-01-24 ENCOUNTER — OFFICE VISIT (OUTPATIENT)
Dept: HEMATOLOGY/ONCOLOGY | Facility: CLINIC | Age: 40
End: 2025-01-24
Payer: COMMERCIAL

## 2025-01-24 VITALS
RESPIRATION RATE: 18 BRPM | HEIGHT: 62 IN | BODY MASS INDEX: 22.62 KG/M2 | WEIGHT: 122.94 LBS | DIASTOLIC BLOOD PRESSURE: 75 MMHG | TEMPERATURE: 98 F | OXYGEN SATURATION: 100 % | SYSTOLIC BLOOD PRESSURE: 119 MMHG | HEART RATE: 77 BPM

## 2025-01-24 DIAGNOSIS — C50.911 INFILTRATING DUCTAL CARCINOMA OF RIGHT BREAST: ICD-10-CM

## 2025-01-24 DIAGNOSIS — Z79.899 ON ANTINEOPLASTIC CHEMOTHERAPY: ICD-10-CM

## 2025-01-24 DIAGNOSIS — C77.3 MALIGNANT NEOPLASM METASTATIC TO LYMPH NODE OF AXILLA: ICD-10-CM

## 2025-01-24 DIAGNOSIS — T45.1X5A ANEMIA DUE TO ANTINEOPLASTIC CHEMOTHERAPY: ICD-10-CM

## 2025-01-24 DIAGNOSIS — D84.821 IMMUNOCOMPROMISED STATE DUE TO DRUG THERAPY: ICD-10-CM

## 2025-01-24 DIAGNOSIS — C50.911 INFILTRATING DUCTAL CARCINOMA OF RIGHT BREAST: Primary | ICD-10-CM

## 2025-01-24 DIAGNOSIS — D64.81 ANEMIA DUE TO ANTINEOPLASTIC CHEMOTHERAPY: ICD-10-CM

## 2025-01-24 DIAGNOSIS — Z79.899 IMMUNOCOMPROMISED STATE DUE TO DRUG THERAPY: ICD-10-CM

## 2025-01-24 LAB
ALBUMIN SERPL-MCNC: 3.7 G/DL (ref 3.5–5)
ALBUMIN/GLOB SERPL: 1.3 RATIO (ref 1.1–2)
ALP SERPL-CCNC: 62 UNIT/L (ref 40–150)
ALT SERPL-CCNC: 32 UNIT/L (ref 0–55)
ANION GAP SERPL CALC-SCNC: 7 MEQ/L
AST SERPL-CCNC: 40 UNIT/L (ref 5–34)
B-HCG UR QL: NEGATIVE
BASOPHILS # BLD AUTO: 0.02 X10(3)/MCL
BASOPHILS NFR BLD AUTO: 0.6 %
BILIRUB SERPL-MCNC: 0.4 MG/DL
BUN SERPL-MCNC: 8.2 MG/DL (ref 7–18.7)
CALCIUM SERPL-MCNC: 9.7 MG/DL (ref 8.4–10.2)
CHLORIDE SERPL-SCNC: 107 MMOL/L (ref 98–107)
CO2 SERPL-SCNC: 27 MMOL/L (ref 22–29)
CREAT SERPL-MCNC: 0.76 MG/DL (ref 0.55–1.02)
CREAT/UREA NIT SERPL: 11
EOSINOPHIL # BLD AUTO: 0.24 X10(3)/MCL (ref 0–0.9)
EOSINOPHIL NFR BLD AUTO: 7.1 %
ERYTHROCYTE [DISTWIDTH] IN BLOOD BY AUTOMATED COUNT: 15.3 % (ref 11.5–17)
GFR SERPLBLD CREATININE-BSD FMLA CKD-EPI: >60 ML/MIN/1.73/M2
GLOBULIN SER-MCNC: 2.9 GM/DL (ref 2.4–3.5)
GLUCOSE SERPL-MCNC: 74 MG/DL (ref 74–100)
HCT VFR BLD AUTO: 33 % (ref 37–47)
HGB BLD-MCNC: 11.4 G/DL (ref 12–16)
IMM GRANULOCYTES # BLD AUTO: 0 X10(3)/MCL (ref 0–0.04)
IMM GRANULOCYTES NFR BLD AUTO: 0 %
LYMPHOCYTES # BLD AUTO: 0.96 X10(3)/MCL (ref 0.6–4.6)
LYMPHOCYTES NFR BLD AUTO: 28.2 %
MAGNESIUM SERPL-MCNC: 1.8 MG/DL (ref 1.6–2.6)
MCH RBC QN AUTO: 32.1 PG (ref 27–31)
MCHC RBC AUTO-ENTMCNC: 34.5 G/DL (ref 33–36)
MCV RBC AUTO: 93 FL (ref 80–94)
MONOCYTES # BLD AUTO: 0.41 X10(3)/MCL (ref 0.1–1.3)
MONOCYTES NFR BLD AUTO: 12.1 %
NEUTROPHILS # BLD AUTO: 1.77 X10(3)/MCL (ref 2.1–9.2)
NEUTROPHILS NFR BLD AUTO: 52 %
PLATELET # BLD AUTO: 246 X10(3)/MCL (ref 130–400)
PMV BLD AUTO: 8.1 FL (ref 7.4–10.4)
POTASSIUM SERPL-SCNC: 4.1 MMOL/L (ref 3.5–5.1)
PROT SERPL-MCNC: 6.6 GM/DL (ref 6.4–8.3)
RBC # BLD AUTO: 3.55 X10(6)/MCL (ref 4.2–5.4)
SODIUM SERPL-SCNC: 141 MMOL/L (ref 136–145)
WBC # BLD AUTO: 3.4 X10(3)/MCL (ref 4.5–11.5)

## 2025-01-24 PROCEDURE — 85025 COMPLETE CBC W/AUTO DIFF WBC: CPT

## 2025-01-24 PROCEDURE — 3078F DIAST BP <80 MM HG: CPT | Mod: CPTII,S$GLB,, | Performed by: NURSE PRACTITIONER

## 2025-01-24 PROCEDURE — 3074F SYST BP LT 130 MM HG: CPT | Mod: CPTII,S$GLB,, | Performed by: NURSE PRACTITIONER

## 2025-01-24 PROCEDURE — 99999 PR PBB SHADOW E&M-EST. PATIENT-LVL IV: CPT | Mod: PBBFAC,,, | Performed by: NURSE PRACTITIONER

## 2025-01-24 PROCEDURE — 3008F BODY MASS INDEX DOCD: CPT | Mod: CPTII,S$GLB,, | Performed by: NURSE PRACTITIONER

## 2025-01-24 PROCEDURE — 80053 COMPREHEN METABOLIC PANEL: CPT

## 2025-01-24 PROCEDURE — 83735 ASSAY OF MAGNESIUM: CPT

## 2025-01-24 PROCEDURE — 99215 OFFICE O/P EST HI 40 MIN: CPT | Mod: S$GLB,,, | Performed by: NURSE PRACTITIONER

## 2025-01-24 PROCEDURE — 81025 URINE PREGNANCY TEST: CPT

## 2025-01-24 PROCEDURE — 1159F MED LIST DOCD IN RCRD: CPT | Mod: CPTII,S$GLB,, | Performed by: NURSE PRACTITIONER

## 2025-01-24 PROCEDURE — 1160F RVW MEDS BY RX/DR IN RCRD: CPT | Mod: CPTII,S$GLB,, | Performed by: NURSE PRACTITIONER

## 2025-01-24 PROCEDURE — 36415 COLL VENOUS BLD VENIPUNCTURE: CPT

## 2025-01-24 RX ORDER — DEXTROAMPHETAMINE SACCHARATE, AMPHETAMINE ASPARTATE, DEXTROAMPHETAMINE SULFATE AND AMPHETAMINE SULFATE 5; 5; 5; 5 MG/1; MG/1; MG/1; MG/1
1 TABLET ORAL 3 TIMES DAILY
Qty: 90 TABLET | Refills: 0 | Status: SHIPPED | OUTPATIENT
Start: 2025-01-24

## 2025-01-24 RX ORDER — ALPRAZOLAM 0.5 MG/1
TABLET ORAL
Qty: 90 TABLET | Refills: 2 | Status: SHIPPED | OUTPATIENT
Start: 2025-01-24

## 2025-01-24 RX ORDER — LACTULOSE 10 G/15ML
SOLUTION ORAL; RECTAL
COMMUNITY
Start: 2024-12-11

## 2025-01-27 ENCOUNTER — INFUSION (OUTPATIENT)
Dept: INFUSION THERAPY | Facility: HOSPITAL | Age: 40
End: 2025-01-27
Attending: INTERNAL MEDICINE
Payer: COMMERCIAL

## 2025-01-27 VITALS
BODY MASS INDEX: 22.45 KG/M2 | SYSTOLIC BLOOD PRESSURE: 132 MMHG | DIASTOLIC BLOOD PRESSURE: 80 MMHG | WEIGHT: 122 LBS | OXYGEN SATURATION: 100 % | TEMPERATURE: 98 F | HEART RATE: 74 BPM | RESPIRATION RATE: 16 BRPM | HEIGHT: 62 IN

## 2025-01-27 DIAGNOSIS — C77.3 MALIGNANT NEOPLASM METASTATIC TO LYMPH NODE OF AXILLA: Primary | ICD-10-CM

## 2025-01-27 DIAGNOSIS — C50.911 INFILTRATING DUCTAL CARCINOMA OF RIGHT BREAST: ICD-10-CM

## 2025-01-27 PROCEDURE — 25000003 PHARM REV CODE 250: Performed by: NURSE PRACTITIONER

## 2025-01-27 PROCEDURE — 96375 TX/PRO/DX INJ NEW DRUG ADDON: CPT

## 2025-01-27 PROCEDURE — 96367 TX/PROPH/DG ADDL SEQ IV INF: CPT

## 2025-01-27 PROCEDURE — 63600175 PHARM REV CODE 636 W HCPCS: Performed by: NURSE PRACTITIONER

## 2025-01-27 PROCEDURE — 96413 CHEMO IV INFUSION 1 HR: CPT

## 2025-01-27 RX ORDER — SODIUM CHLORIDE 0.9 % (FLUSH) 0.9 %
10 SYRINGE (ML) INJECTION
Status: DISCONTINUED | OUTPATIENT
Start: 2025-01-27 | End: 2025-01-27 | Stop reason: HOSPADM

## 2025-01-27 RX ORDER — DEXAMETHASONE SODIUM PHOSPHATE 10 MG/ML
10 INJECTION INTRAMUSCULAR; INTRAVENOUS
Status: COMPLETED | OUTPATIENT
Start: 2025-01-27 | End: 2025-01-27

## 2025-01-27 RX ORDER — EPINEPHRINE 0.3 MG/.3ML
0.3 INJECTION SUBCUTANEOUS ONCE AS NEEDED
Status: DISCONTINUED | OUTPATIENT
Start: 2025-01-27 | End: 2025-01-27 | Stop reason: HOSPADM

## 2025-01-27 RX ORDER — DIPHENHYDRAMINE HYDROCHLORIDE 50 MG/ML
50 INJECTION INTRAMUSCULAR; INTRAVENOUS ONCE AS NEEDED
Status: DISCONTINUED | OUTPATIENT
Start: 2025-01-27 | End: 2025-01-27 | Stop reason: HOSPADM

## 2025-01-27 RX ORDER — FAMOTIDINE 10 MG/ML
20 INJECTION INTRAVENOUS
Status: COMPLETED | OUTPATIENT
Start: 2025-01-27 | End: 2025-01-27

## 2025-01-27 RX ORDER — HEPARIN 100 UNIT/ML
500 SYRINGE INTRAVENOUS
Status: DISCONTINUED | OUTPATIENT
Start: 2025-01-27 | End: 2025-01-27 | Stop reason: HOSPADM

## 2025-01-27 RX ADMIN — PACLITAXEL 120 MG: 6 INJECTION, SOLUTION INTRAVENOUS at 02:01

## 2025-01-27 RX ADMIN — FAMOTIDINE 20 MG: 10 INJECTION, SOLUTION INTRAVENOUS at 02:01

## 2025-01-27 RX ADMIN — DEXAMETHASONE SODIUM PHOSPHATE 10 MG: 10 INJECTION, SOLUTION INTRAMUSCULAR; INTRAVENOUS at 02:01

## 2025-01-27 RX ADMIN — HEPARIN 500 UNITS: 100 SYRINGE at 03:01

## 2025-01-27 RX ADMIN — SODIUM CHLORIDE 50 MG: 9 INJECTION, SOLUTION INTRAVENOUS at 02:01

## 2025-01-30 ENCOUNTER — PATIENT MESSAGE (OUTPATIENT)
Dept: HEMATOLOGY/ONCOLOGY | Facility: CLINIC | Age: 40
End: 2025-01-30
Payer: COMMERCIAL

## 2025-01-31 ENCOUNTER — LAB VISIT (OUTPATIENT)
Dept: LAB | Facility: HOSPITAL | Age: 40
End: 2025-01-31
Attending: INTERNAL MEDICINE
Payer: COMMERCIAL

## 2025-01-31 DIAGNOSIS — C77.3 MALIGNANT NEOPLASM METASTATIC TO LYMPH NODE OF AXILLA: ICD-10-CM

## 2025-01-31 LAB
ALBUMIN SERPL-MCNC: 3.7 G/DL (ref 3.5–5)
ALBUMIN/GLOB SERPL: 1.3 RATIO (ref 1.1–2)
ALP SERPL-CCNC: 57 UNIT/L (ref 40–150)
ALT SERPL-CCNC: 30 UNIT/L (ref 0–55)
ANION GAP SERPL CALC-SCNC: 8 MEQ/L
AST SERPL-CCNC: 37 UNIT/L (ref 5–34)
BASOPHILS # BLD AUTO: 0 X10(3)/MCL
BASOPHILS NFR BLD AUTO: 0 %
BILIRUB SERPL-MCNC: 0.5 MG/DL
BUN SERPL-MCNC: 10 MG/DL (ref 7–18.7)
CALCIUM SERPL-MCNC: 9.3 MG/DL (ref 8.4–10.2)
CHLORIDE SERPL-SCNC: 107 MMOL/L (ref 98–107)
CO2 SERPL-SCNC: 25 MMOL/L (ref 22–29)
CREAT SERPL-MCNC: 0.77 MG/DL (ref 0.55–1.02)
CREAT/UREA NIT SERPL: 13
EOSINOPHIL # BLD AUTO: 0.19 X10(3)/MCL (ref 0–0.9)
EOSINOPHIL NFR BLD AUTO: 6.3 %
ERYTHROCYTE [DISTWIDTH] IN BLOOD BY AUTOMATED COUNT: 13.5 % (ref 11.5–17)
GFR SERPLBLD CREATININE-BSD FMLA CKD-EPI: >60 ML/MIN/1.73/M2
GLOBULIN SER-MCNC: 2.9 GM/DL (ref 2.4–3.5)
GLUCOSE SERPL-MCNC: 77 MG/DL (ref 74–100)
HCT VFR BLD AUTO: 34.1 % (ref 37–47)
HGB BLD-MCNC: 11.6 G/DL (ref 12–16)
IMM GRANULOCYTES # BLD AUTO: 0.01 X10(3)/MCL (ref 0–0.04)
IMM GRANULOCYTES NFR BLD AUTO: 0.3 %
LYMPHOCYTES # BLD AUTO: 0.78 X10(3)/MCL (ref 0.6–4.6)
LYMPHOCYTES NFR BLD AUTO: 25.7 %
MAGNESIUM SERPL-MCNC: 1.8 MG/DL (ref 1.6–2.6)
MCH RBC QN AUTO: 31.4 PG (ref 27–31)
MCHC RBC AUTO-ENTMCNC: 34 G/DL (ref 33–36)
MCV RBC AUTO: 92.2 FL (ref 80–94)
MONOCYTES # BLD AUTO: 0.16 X10(3)/MCL (ref 0.1–1.3)
MONOCYTES NFR BLD AUTO: 5.3 %
NEUTROPHILS # BLD AUTO: 1.89 X10(3)/MCL (ref 2.1–9.2)
NEUTROPHILS NFR BLD AUTO: 62.4 %
PLATELET # BLD AUTO: 160 X10(3)/MCL (ref 130–400)
PMV BLD AUTO: 8.2 FL (ref 7.4–10.4)
POTASSIUM SERPL-SCNC: 4.1 MMOL/L (ref 3.5–5.1)
PROT SERPL-MCNC: 6.6 GM/DL (ref 6.4–8.3)
RBC # BLD AUTO: 3.7 X10(6)/MCL (ref 4.2–5.4)
SODIUM SERPL-SCNC: 140 MMOL/L (ref 136–145)
WBC # BLD AUTO: 3.03 X10(3)/MCL (ref 4.5–11.5)

## 2025-01-31 PROCEDURE — 83735 ASSAY OF MAGNESIUM: CPT

## 2025-01-31 PROCEDURE — 36415 COLL VENOUS BLD VENIPUNCTURE: CPT

## 2025-01-31 PROCEDURE — 85025 COMPLETE CBC W/AUTO DIFF WBC: CPT

## 2025-01-31 PROCEDURE — 80053 COMPREHEN METABOLIC PANEL: CPT

## 2025-01-31 RX ORDER — SODIUM CHLORIDE 0.9 % (FLUSH) 0.9 %
10 SYRINGE (ML) INJECTION
Status: CANCELLED | OUTPATIENT
Start: 2025-02-03

## 2025-01-31 RX ORDER — DEXAMETHASONE SODIUM PHOSPHATE 10 MG/ML
10 INJECTION INTRAMUSCULAR; INTRAVENOUS
Status: CANCELLED | OUTPATIENT
Start: 2025-02-03

## 2025-01-31 RX ORDER — DIPHENHYDRAMINE HYDROCHLORIDE 50 MG/ML
50 INJECTION INTRAMUSCULAR; INTRAVENOUS ONCE AS NEEDED
Status: CANCELLED | OUTPATIENT
Start: 2025-02-03

## 2025-01-31 RX ORDER — EPINEPHRINE 0.3 MG/.3ML
0.3 INJECTION SUBCUTANEOUS ONCE AS NEEDED
Status: CANCELLED | OUTPATIENT
Start: 2025-02-03

## 2025-01-31 RX ORDER — FAMOTIDINE 10 MG/ML
20 INJECTION INTRAVENOUS
Status: CANCELLED | OUTPATIENT
Start: 2025-02-03

## 2025-01-31 RX ORDER — HEPARIN 100 UNIT/ML
500 SYRINGE INTRAVENOUS
Status: CANCELLED | OUTPATIENT
Start: 2025-02-03

## 2025-02-03 ENCOUNTER — INFUSION (OUTPATIENT)
Dept: INFUSION THERAPY | Facility: HOSPITAL | Age: 40
End: 2025-02-03
Attending: INTERNAL MEDICINE
Payer: COMMERCIAL

## 2025-02-03 VITALS
TEMPERATURE: 98 F | RESPIRATION RATE: 18 BRPM | OXYGEN SATURATION: 100 % | WEIGHT: 122.69 LBS | HEART RATE: 80 BPM | DIASTOLIC BLOOD PRESSURE: 73 MMHG | BODY MASS INDEX: 22.58 KG/M2 | HEIGHT: 62 IN | SYSTOLIC BLOOD PRESSURE: 112 MMHG

## 2025-02-03 DIAGNOSIS — Z79.899 PROPHYLACTIC CHEMOTHERAPY: Primary | ICD-10-CM

## 2025-02-03 DIAGNOSIS — C77.3 MALIGNANT NEOPLASM METASTATIC TO LYMPH NODE OF AXILLA: ICD-10-CM

## 2025-02-03 DIAGNOSIS — C50.911 INFILTRATING DUCTAL CARCINOMA OF RIGHT BREAST: ICD-10-CM

## 2025-02-03 PROCEDURE — 96413 CHEMO IV INFUSION 1 HR: CPT

## 2025-02-03 PROCEDURE — 96375 TX/PRO/DX INJ NEW DRUG ADDON: CPT

## 2025-02-03 PROCEDURE — 25000003 PHARM REV CODE 250: Performed by: NURSE PRACTITIONER

## 2025-02-03 PROCEDURE — A4216 STERILE WATER/SALINE, 10 ML: HCPCS | Performed by: NURSE PRACTITIONER

## 2025-02-03 PROCEDURE — 96367 TX/PROPH/DG ADDL SEQ IV INF: CPT

## 2025-02-03 PROCEDURE — 63600175 PHARM REV CODE 636 W HCPCS: Performed by: NURSE PRACTITIONER

## 2025-02-03 RX ORDER — HEPARIN 100 UNIT/ML
500 SYRINGE INTRAVENOUS
Status: DISCONTINUED | OUTPATIENT
Start: 2025-02-03 | End: 2025-02-03 | Stop reason: HOSPADM

## 2025-02-03 RX ORDER — DIPHENHYDRAMINE HYDROCHLORIDE 50 MG/ML
50 INJECTION INTRAMUSCULAR; INTRAVENOUS ONCE AS NEEDED
Status: DISCONTINUED | OUTPATIENT
Start: 2025-02-03 | End: 2025-02-03 | Stop reason: HOSPADM

## 2025-02-03 RX ORDER — FAMOTIDINE 10 MG/ML
20 INJECTION INTRAVENOUS
Status: COMPLETED | OUTPATIENT
Start: 2025-02-03 | End: 2025-02-03

## 2025-02-03 RX ORDER — EPINEPHRINE 0.3 MG/.3ML
0.3 INJECTION SUBCUTANEOUS ONCE AS NEEDED
Status: DISCONTINUED | OUTPATIENT
Start: 2025-02-03 | End: 2025-02-03 | Stop reason: HOSPADM

## 2025-02-03 RX ORDER — DEXAMETHASONE SODIUM PHOSPHATE 10 MG/ML
10 INJECTION, SOLUTION INTRA-ARTICULAR; INTRALESIONAL; INTRAMUSCULAR; INTRAVENOUS; SOFT TISSUE
Status: COMPLETED | OUTPATIENT
Start: 2025-02-03 | End: 2025-02-03

## 2025-02-03 RX ORDER — SODIUM CHLORIDE 0.9 % (FLUSH) 0.9 %
10 SYRINGE (ML) INJECTION
Status: DISCONTINUED | OUTPATIENT
Start: 2025-02-03 | End: 2025-02-03 | Stop reason: HOSPADM

## 2025-02-03 RX ADMIN — SODIUM CHLORIDE 50 MG: 9 INJECTION, SOLUTION INTRAVENOUS at 02:02

## 2025-02-03 RX ADMIN — DEXAMETHASONE SODIUM PHOSPHATE 10 MG: 10 INJECTION, SOLUTION INTRAMUSCULAR; INTRAVENOUS at 02:02

## 2025-02-03 RX ADMIN — SODIUM CHLORIDE, PRESERVATIVE FREE 10 ML: 5 INJECTION INTRAVENOUS at 04:02

## 2025-02-03 RX ADMIN — HEPARIN 500 UNITS: 100 SYRINGE at 04:02

## 2025-02-03 RX ADMIN — PACLITAXEL 120 MG: 6 INJECTION, SOLUTION INTRAVENOUS at 03:02

## 2025-02-03 RX ADMIN — FAMOTIDINE 20 MG: 10 INJECTION, SOLUTION INTRAVENOUS at 02:02

## 2025-02-07 ENCOUNTER — LAB VISIT (OUTPATIENT)
Dept: LAB | Facility: HOSPITAL | Age: 40
End: 2025-02-07
Attending: INTERNAL MEDICINE
Payer: COMMERCIAL

## 2025-02-07 ENCOUNTER — PATIENT MESSAGE (OUTPATIENT)
Dept: HEMATOLOGY/ONCOLOGY | Facility: CLINIC | Age: 40
End: 2025-02-07
Payer: COMMERCIAL

## 2025-02-07 DIAGNOSIS — C77.3 MALIGNANT NEOPLASM METASTATIC TO LYMPH NODE OF AXILLA: ICD-10-CM

## 2025-02-07 DIAGNOSIS — C50.911 INFILTRATING DUCTAL CARCINOMA OF RIGHT BREAST: ICD-10-CM

## 2025-02-07 DIAGNOSIS — Z79.899 ON ANTINEOPLASTIC CHEMOTHERAPY: ICD-10-CM

## 2025-02-07 LAB
ALBUMIN SERPL-MCNC: 3.8 G/DL (ref 3.5–5)
ALBUMIN/GLOB SERPL: 1.4 RATIO (ref 1.1–2)
ALP SERPL-CCNC: 53 UNIT/L (ref 40–150)
ALT SERPL-CCNC: 33 UNIT/L (ref 0–55)
ANION GAP SERPL CALC-SCNC: 7 MEQ/L
AST SERPL-CCNC: 38 UNIT/L (ref 5–34)
B-HCG UR QL: NEGATIVE
BASOPHILS # BLD AUTO: 0.01 X10(3)/MCL
BASOPHILS NFR BLD AUTO: 0.5 %
BILIRUB SERPL-MCNC: 0.6 MG/DL
BUN SERPL-MCNC: 12.8 MG/DL (ref 7–18.7)
CALCIUM SERPL-MCNC: 9.2 MG/DL (ref 8.4–10.2)
CHLORIDE SERPL-SCNC: 107 MMOL/L (ref 98–107)
CO2 SERPL-SCNC: 26 MMOL/L (ref 22–29)
CREAT SERPL-MCNC: 0.72 MG/DL (ref 0.55–1.02)
CREAT/UREA NIT SERPL: 18
EOSINOPHIL # BLD AUTO: 0.18 X10(3)/MCL (ref 0–0.9)
EOSINOPHIL NFR BLD AUTO: 8.7 %
ERYTHROCYTE [DISTWIDTH] IN BLOOD BY AUTOMATED COUNT: 13 % (ref 11.5–17)
GFR SERPLBLD CREATININE-BSD FMLA CKD-EPI: >60 ML/MIN/1.73/M2
GLOBULIN SER-MCNC: 2.8 GM/DL (ref 2.4–3.5)
GLUCOSE SERPL-MCNC: 93 MG/DL (ref 74–100)
HCT VFR BLD AUTO: 32.8 % (ref 37–47)
HGB BLD-MCNC: 11 G/DL (ref 12–16)
IMM GRANULOCYTES # BLD AUTO: 0 X10(3)/MCL (ref 0–0.04)
IMM GRANULOCYTES NFR BLD AUTO: 0 %
LYMPHOCYTES # BLD AUTO: 0.75 X10(3)/MCL (ref 0.6–4.6)
LYMPHOCYTES NFR BLD AUTO: 36.2 %
MAGNESIUM SERPL-MCNC: 1.9 MG/DL (ref 1.6–2.6)
MCH RBC QN AUTO: 31.2 PG (ref 27–31)
MCHC RBC AUTO-ENTMCNC: 33.5 G/DL (ref 33–36)
MCV RBC AUTO: 92.9 FL (ref 80–94)
MONOCYTES # BLD AUTO: 0.1 X10(3)/MCL (ref 0.1–1.3)
MONOCYTES NFR BLD AUTO: 4.8 %
NEUTROPHILS # BLD AUTO: 1.03 X10(3)/MCL (ref 2.1–9.2)
NEUTROPHILS NFR BLD AUTO: 49.8 %
PLATELET # BLD AUTO: 203 X10(3)/MCL (ref 130–400)
PMV BLD AUTO: 8.5 FL (ref 7.4–10.4)
POTASSIUM SERPL-SCNC: 4 MMOL/L (ref 3.5–5.1)
PROT SERPL-MCNC: 6.6 GM/DL (ref 6.4–8.3)
RBC # BLD AUTO: 3.53 X10(6)/MCL (ref 4.2–5.4)
SODIUM SERPL-SCNC: 140 MMOL/L (ref 136–145)
WBC # BLD AUTO: 2.07 X10(3)/MCL (ref 4.5–11.5)

## 2025-02-07 PROCEDURE — 81025 URINE PREGNANCY TEST: CPT

## 2025-02-07 PROCEDURE — 80053 COMPREHEN METABOLIC PANEL: CPT

## 2025-02-07 PROCEDURE — 36415 COLL VENOUS BLD VENIPUNCTURE: CPT

## 2025-02-07 PROCEDURE — 83735 ASSAY OF MAGNESIUM: CPT

## 2025-02-07 PROCEDURE — 85025 COMPLETE CBC W/AUTO DIFF WBC: CPT

## 2025-02-07 RX ORDER — DEXAMETHASONE SODIUM PHOSPHATE 10 MG/ML
10 INJECTION INTRAMUSCULAR; INTRAVENOUS
Status: CANCELLED | OUTPATIENT
Start: 2025-02-10

## 2025-02-07 RX ORDER — DIPHENHYDRAMINE HYDROCHLORIDE 50 MG/ML
50 INJECTION INTRAMUSCULAR; INTRAVENOUS ONCE AS NEEDED
Status: CANCELLED | OUTPATIENT
Start: 2025-02-10

## 2025-02-07 RX ORDER — FAMOTIDINE 10 MG/ML
20 INJECTION INTRAVENOUS
Status: CANCELLED | OUTPATIENT
Start: 2025-02-10

## 2025-02-07 RX ORDER — EPINEPHRINE 0.3 MG/.3ML
0.3 INJECTION SUBCUTANEOUS ONCE AS NEEDED
Status: CANCELLED | OUTPATIENT
Start: 2025-02-10

## 2025-02-07 RX ORDER — HEPARIN 100 UNIT/ML
500 SYRINGE INTRAVENOUS
Status: CANCELLED | OUTPATIENT
Start: 2025-02-10

## 2025-02-07 RX ORDER — SODIUM CHLORIDE 0.9 % (FLUSH) 0.9 %
10 SYRINGE (ML) INJECTION
Status: CANCELLED | OUTPATIENT
Start: 2025-02-10

## 2025-02-10 ENCOUNTER — INFUSION (OUTPATIENT)
Dept: INFUSION THERAPY | Facility: HOSPITAL | Age: 40
End: 2025-02-10
Attending: INTERNAL MEDICINE
Payer: COMMERCIAL

## 2025-02-10 VITALS
TEMPERATURE: 98 F | BODY MASS INDEX: 22.69 KG/M2 | SYSTOLIC BLOOD PRESSURE: 126 MMHG | WEIGHT: 123.31 LBS | RESPIRATION RATE: 16 BRPM | HEIGHT: 62 IN | DIASTOLIC BLOOD PRESSURE: 85 MMHG | OXYGEN SATURATION: 100 % | HEART RATE: 77 BPM

## 2025-02-10 DIAGNOSIS — C50.911 INFILTRATING DUCTAL CARCINOMA OF RIGHT BREAST: ICD-10-CM

## 2025-02-10 DIAGNOSIS — C77.3 MALIGNANT NEOPLASM METASTATIC TO LYMPH NODE OF AXILLA: Primary | ICD-10-CM

## 2025-02-10 PROCEDURE — 63600175 PHARM REV CODE 636 W HCPCS: Performed by: NURSE PRACTITIONER

## 2025-02-10 PROCEDURE — 96375 TX/PRO/DX INJ NEW DRUG ADDON: CPT

## 2025-02-10 PROCEDURE — 25000003 PHARM REV CODE 250: Performed by: NURSE PRACTITIONER

## 2025-02-10 PROCEDURE — 96367 TX/PROPH/DG ADDL SEQ IV INF: CPT

## 2025-02-10 PROCEDURE — 96413 CHEMO IV INFUSION 1 HR: CPT

## 2025-02-10 RX ORDER — EPINEPHRINE 0.3 MG/.3ML
0.3 INJECTION SUBCUTANEOUS ONCE AS NEEDED
Status: DISCONTINUED | OUTPATIENT
Start: 2025-02-10 | End: 2025-02-10 | Stop reason: HOSPADM

## 2025-02-10 RX ORDER — DIPHENHYDRAMINE HYDROCHLORIDE 50 MG/ML
50 INJECTION INTRAMUSCULAR; INTRAVENOUS ONCE AS NEEDED
Status: DISCONTINUED | OUTPATIENT
Start: 2025-02-10 | End: 2025-02-10 | Stop reason: HOSPADM

## 2025-02-10 RX ORDER — DEXAMETHASONE SODIUM PHOSPHATE 10 MG/ML
10 INJECTION, SOLUTION INTRA-ARTICULAR; INTRALESIONAL; INTRAMUSCULAR; INTRAVENOUS; SOFT TISSUE
Status: COMPLETED | OUTPATIENT
Start: 2025-02-10 | End: 2025-02-10

## 2025-02-10 RX ORDER — FAMOTIDINE 10 MG/ML
20 INJECTION INTRAVENOUS
Status: COMPLETED | OUTPATIENT
Start: 2025-02-10 | End: 2025-02-10

## 2025-02-10 RX ORDER — HEPARIN 100 UNIT/ML
500 SYRINGE INTRAVENOUS
Status: DISCONTINUED | OUTPATIENT
Start: 2025-02-10 | End: 2025-02-10 | Stop reason: HOSPADM

## 2025-02-10 RX ORDER — SODIUM CHLORIDE 0.9 % (FLUSH) 0.9 %
10 SYRINGE (ML) INJECTION
Status: DISCONTINUED | OUTPATIENT
Start: 2025-02-10 | End: 2025-02-10 | Stop reason: HOSPADM

## 2025-02-10 RX ADMIN — SODIUM CHLORIDE 50 MG: 9 INJECTION, SOLUTION INTRAVENOUS at 02:02

## 2025-02-10 RX ADMIN — FAMOTIDINE 20 MG: 10 INJECTION, SOLUTION INTRAVENOUS at 02:02

## 2025-02-10 RX ADMIN — DEXAMETHASONE SODIUM PHOSPHATE 10 MG: 10 INJECTION, SOLUTION INTRAMUSCULAR; INTRAVENOUS at 02:02

## 2025-02-10 RX ADMIN — PACLITAXEL 120 MG: 6 INJECTION, SOLUTION INTRAVENOUS at 02:02

## 2025-02-10 RX ADMIN — HEPARIN 500 UNITS: 100 SYRINGE at 03:02

## 2025-02-11 ENCOUNTER — PATIENT MESSAGE (OUTPATIENT)
Dept: HEMATOLOGY/ONCOLOGY | Facility: CLINIC | Age: 40
End: 2025-02-11
Payer: COMMERCIAL

## 2025-02-14 ENCOUNTER — LAB VISIT (OUTPATIENT)
Dept: LAB | Facility: HOSPITAL | Age: 40
End: 2025-02-14
Attending: INTERNAL MEDICINE
Payer: COMMERCIAL

## 2025-02-14 DIAGNOSIS — Z79.899 ON ANTINEOPLASTIC CHEMOTHERAPY: ICD-10-CM

## 2025-02-14 DIAGNOSIS — C77.3 MALIGNANT NEOPLASM METASTATIC TO LYMPH NODE OF AXILLA: ICD-10-CM

## 2025-02-14 DIAGNOSIS — C50.911 INFILTRATING DUCTAL CARCINOMA OF RIGHT BREAST: ICD-10-CM

## 2025-02-14 LAB
ALBUMIN SERPL-MCNC: 3.8 G/DL (ref 3.5–5)
ALBUMIN/GLOB SERPL: 1.3 RATIO (ref 1.1–2)
ALP SERPL-CCNC: 50 UNIT/L (ref 40–150)
ALT SERPL-CCNC: 28 UNIT/L (ref 0–55)
ANION GAP SERPL CALC-SCNC: 3 MEQ/L
AST SERPL-CCNC: 35 UNIT/L (ref 5–34)
BASOPHILS # BLD AUTO: 0.01 X10(3)/MCL
BASOPHILS NFR BLD AUTO: 0.4 %
BILIRUB SERPL-MCNC: 0.5 MG/DL
BUN SERPL-MCNC: 11.6 MG/DL (ref 7–18.7)
CALCIUM SERPL-MCNC: 9.3 MG/DL (ref 8.4–10.2)
CHLORIDE SERPL-SCNC: 107 MMOL/L (ref 98–107)
CO2 SERPL-SCNC: 28 MMOL/L (ref 22–29)
CREAT SERPL-MCNC: 0.75 MG/DL (ref 0.55–1.02)
CREAT/UREA NIT SERPL: 15
EOSINOPHIL # BLD AUTO: 0.08 X10(3)/MCL (ref 0–0.9)
EOSINOPHIL NFR BLD AUTO: 3.2 %
ERYTHROCYTE [DISTWIDTH] IN BLOOD BY AUTOMATED COUNT: 12.9 % (ref 11.5–17)
GFR SERPLBLD CREATININE-BSD FMLA CKD-EPI: >60 ML/MIN/1.73/M2
GLOBULIN SER-MCNC: 2.9 GM/DL (ref 2.4–3.5)
GLUCOSE SERPL-MCNC: 81 MG/DL (ref 74–100)
HCT VFR BLD AUTO: 32.6 % (ref 37–47)
HGB BLD-MCNC: 11.2 G/DL (ref 12–16)
IMM GRANULOCYTES # BLD AUTO: 0 X10(3)/MCL (ref 0–0.04)
IMM GRANULOCYTES NFR BLD AUTO: 0 %
LYMPHOCYTES # BLD AUTO: 0.68 X10(3)/MCL (ref 0.6–4.6)
LYMPHOCYTES NFR BLD AUTO: 27.5 %
MAGNESIUM SERPL-MCNC: 1.8 MG/DL (ref 1.6–2.6)
MCH RBC QN AUTO: 32.2 PG (ref 27–31)
MCHC RBC AUTO-ENTMCNC: 34.4 G/DL (ref 33–36)
MCV RBC AUTO: 93.7 FL (ref 80–94)
MONOCYTES # BLD AUTO: 0.07 X10(3)/MCL (ref 0.1–1.3)
MONOCYTES NFR BLD AUTO: 2.8 %
NEUTROPHILS # BLD AUTO: 1.63 X10(3)/MCL (ref 2.1–9.2)
NEUTROPHILS NFR BLD AUTO: 66.1 %
PLATELET # BLD AUTO: 217 X10(3)/MCL (ref 130–400)
PMV BLD AUTO: 8 FL (ref 7.4–10.4)
POTASSIUM SERPL-SCNC: 4 MMOL/L (ref 3.5–5.1)
PROT SERPL-MCNC: 6.7 GM/DL (ref 6.4–8.3)
RBC # BLD AUTO: 3.48 X10(6)/MCL (ref 4.2–5.4)
SODIUM SERPL-SCNC: 138 MMOL/L (ref 136–145)
WBC # BLD AUTO: 2.47 X10(3)/MCL (ref 4.5–11.5)

## 2025-02-14 PROCEDURE — 85025 COMPLETE CBC W/AUTO DIFF WBC: CPT

## 2025-02-14 PROCEDURE — 83735 ASSAY OF MAGNESIUM: CPT

## 2025-02-14 PROCEDURE — 36415 COLL VENOUS BLD VENIPUNCTURE: CPT

## 2025-02-14 PROCEDURE — 80053 COMPREHEN METABOLIC PANEL: CPT

## 2025-02-16 ENCOUNTER — PATIENT MESSAGE (OUTPATIENT)
Dept: HEMATOLOGY/ONCOLOGY | Facility: CLINIC | Age: 40
End: 2025-02-16
Payer: COMMERCIAL

## 2025-02-17 ENCOUNTER — OFFICE VISIT (OUTPATIENT)
Dept: HEMATOLOGY/ONCOLOGY | Facility: CLINIC | Age: 40
End: 2025-02-17
Payer: COMMERCIAL

## 2025-02-17 ENCOUNTER — INFUSION (OUTPATIENT)
Dept: INFUSION THERAPY | Facility: HOSPITAL | Age: 40
End: 2025-02-17
Attending: INTERNAL MEDICINE
Payer: COMMERCIAL

## 2025-02-17 VITALS
RESPIRATION RATE: 18 BRPM | SYSTOLIC BLOOD PRESSURE: 121 MMHG | HEART RATE: 77 BPM | DIASTOLIC BLOOD PRESSURE: 73 MMHG | WEIGHT: 123.31 LBS | BODY MASS INDEX: 22.69 KG/M2 | HEIGHT: 62 IN | OXYGEN SATURATION: 100 % | TEMPERATURE: 98 F

## 2025-02-17 DIAGNOSIS — C50.911 INFILTRATING DUCTAL CARCINOMA OF RIGHT BREAST: Primary | ICD-10-CM

## 2025-02-17 DIAGNOSIS — R53.1 WEAKNESS: ICD-10-CM

## 2025-02-17 DIAGNOSIS — C50.911 INFILTRATING DUCTAL CARCINOMA OF RIGHT BREAST: ICD-10-CM

## 2025-02-17 DIAGNOSIS — C77.3 MALIGNANT NEOPLASM METASTATIC TO LYMPH NODE OF AXILLA: Primary | ICD-10-CM

## 2025-02-17 DIAGNOSIS — Z79.899 ON ANTINEOPLASTIC CHEMOTHERAPY: ICD-10-CM

## 2025-02-17 DIAGNOSIS — C77.3 MALIGNANT NEOPLASM METASTATIC TO LYMPH NODE OF AXILLA: ICD-10-CM

## 2025-02-17 LAB
B-HCG UR QL: NEGATIVE
CTP QC/QA: YES

## 2025-02-17 PROCEDURE — 1159F MED LIST DOCD IN RCRD: CPT | Mod: CPTII,S$GLB,, | Performed by: NURSE PRACTITIONER

## 2025-02-17 PROCEDURE — 96413 CHEMO IV INFUSION 1 HR: CPT

## 2025-02-17 PROCEDURE — 25000003 PHARM REV CODE 250: Performed by: NURSE PRACTITIONER

## 2025-02-17 PROCEDURE — 1160F RVW MEDS BY RX/DR IN RCRD: CPT | Mod: CPTII,S$GLB,, | Performed by: NURSE PRACTITIONER

## 2025-02-17 PROCEDURE — 63600175 PHARM REV CODE 636 W HCPCS: Performed by: NURSE PRACTITIONER

## 2025-02-17 PROCEDURE — 81025 URINE PREGNANCY TEST: CPT | Performed by: INTERNAL MEDICINE

## 2025-02-17 PROCEDURE — 96375 TX/PRO/DX INJ NEW DRUG ADDON: CPT

## 2025-02-17 RX ORDER — SODIUM CHLORIDE 0.9 % (FLUSH) 0.9 %
10 SYRINGE (ML) INJECTION
Status: CANCELLED | OUTPATIENT
Start: 2025-02-18

## 2025-02-17 RX ORDER — FAMOTIDINE 10 MG/ML
20 INJECTION INTRAVENOUS
Status: COMPLETED | OUTPATIENT
Start: 2025-02-17 | End: 2025-02-17

## 2025-02-17 RX ORDER — SODIUM CHLORIDE 0.9 % (FLUSH) 0.9 %
10 SYRINGE (ML) INJECTION
Status: DISCONTINUED | OUTPATIENT
Start: 2025-02-17 | End: 2025-02-17 | Stop reason: HOSPADM

## 2025-02-17 RX ORDER — DIPHENHYDRAMINE HYDROCHLORIDE 50 MG/ML
50 INJECTION INTRAMUSCULAR; INTRAVENOUS ONCE AS NEEDED
Status: DISCONTINUED | OUTPATIENT
Start: 2025-02-17 | End: 2025-02-17 | Stop reason: HOSPADM

## 2025-02-17 RX ORDER — DEXAMETHASONE SODIUM PHOSPHATE 10 MG/ML
10 INJECTION INTRAMUSCULAR; INTRAVENOUS
Status: CANCELLED | OUTPATIENT
Start: 2025-02-17

## 2025-02-17 RX ORDER — EPINEPHRINE 0.3 MG/.3ML
0.3 INJECTION SUBCUTANEOUS ONCE AS NEEDED
Status: DISCONTINUED | OUTPATIENT
Start: 2025-02-17 | End: 2025-02-17 | Stop reason: HOSPADM

## 2025-02-17 RX ORDER — HEPARIN 100 UNIT/ML
500 SYRINGE INTRAVENOUS
Status: CANCELLED | OUTPATIENT
Start: 2025-02-17

## 2025-02-17 RX ORDER — HEPARIN 100 UNIT/ML
500 SYRINGE INTRAVENOUS
Status: DISCONTINUED | OUTPATIENT
Start: 2025-02-17 | End: 2025-02-17 | Stop reason: HOSPADM

## 2025-02-17 RX ORDER — HEPARIN 100 UNIT/ML
500 SYRINGE INTRAVENOUS
Status: CANCELLED | OUTPATIENT
Start: 2025-02-18

## 2025-02-17 RX ORDER — DEXAMETHASONE SODIUM PHOSPHATE 10 MG/ML
10 INJECTION, SOLUTION INTRA-ARTICULAR; INTRALESIONAL; INTRAMUSCULAR; INTRAVENOUS; SOFT TISSUE
Status: COMPLETED | OUTPATIENT
Start: 2025-02-17 | End: 2025-02-17

## 2025-02-17 RX ORDER — DIPHENHYDRAMINE HYDROCHLORIDE 50 MG/ML
50 INJECTION INTRAMUSCULAR; INTRAVENOUS ONCE AS NEEDED
Status: CANCELLED | OUTPATIENT
Start: 2025-02-17

## 2025-02-17 RX ORDER — FAMOTIDINE 10 MG/ML
20 INJECTION INTRAVENOUS
Status: CANCELLED | OUTPATIENT
Start: 2025-02-17

## 2025-02-17 RX ORDER — SODIUM CHLORIDE 0.9 % (FLUSH) 0.9 %
10 SYRINGE (ML) INJECTION
Status: CANCELLED | OUTPATIENT
Start: 2025-02-17

## 2025-02-17 RX ORDER — EPINEPHRINE 0.3 MG/.3ML
0.3 INJECTION SUBCUTANEOUS ONCE AS NEEDED
Status: CANCELLED | OUTPATIENT
Start: 2025-02-17

## 2025-02-17 RX ADMIN — DEXAMETHASONE SODIUM PHOSPHATE 10 MG: 10 INJECTION, SOLUTION INTRAMUSCULAR; INTRAVENOUS at 02:02

## 2025-02-17 RX ADMIN — PACLITAXEL 120 MG: 6 INJECTION, SOLUTION INTRAVENOUS at 02:02

## 2025-02-17 RX ADMIN — FAMOTIDINE 20 MG: 10 INJECTION, SOLUTION INTRAVENOUS at 02:02

## 2025-02-17 RX ADMIN — SODIUM CHLORIDE 50 MG: 9 INJECTION, SOLUTION INTRAVENOUS at 02:02

## 2025-02-17 RX ADMIN — SODIUM CHLORIDE: 9 INJECTION, SOLUTION INTRAVENOUS at 02:02

## 2025-02-17 NOTE — PLAN OF CARE
Plan of care reviewed with the patient. C11 D1 Taxol given, tolerated well. Discharged in stable condition and is aware of future appointments.

## 2025-02-17 NOTE — PROGRESS NOTES
See   Plan    /orders   Subjective:       Patient ID: Joo Haywood is a 39 y.o. female.    Chief Complaint: No chief complaint on file.      Diagnosis:  pT2, N1a, M0 stage II invasive ductal carcinoma, grade 3, ER 89.9% positive, IN 32.6% positive, HER2 Tyshawn negative by IHC (0) with a Ki-67 of 81.8%.    Current Treatment:   Adjuvant chemotherapy with dose dense AC followed by weekly Taxol.  Started on 10/28/2024.  Patient will need adjuvant radiation due to lymph node involvement and lumpectomy.  Patient will need adjuvant endocrine therapy, due to bonilla involvement would recommend ovarian suppression and aromatase inhibitor for 10 years    Treatment History:  Right-sided lumpectomy and sentinel lymph node on 08/09/2024    HPI:  39-year-old female who palpated a breast mass in January 2024.  After undergoing some insurance changes, she underwent  screening digital mammogram with tomosynthesis on 06/13/2024 at breast Center [facility].  This showed a subtle 2.0 cm round focal asymmetry in the right breast at the 12 o'clock position.  This corresponded to a lump that the patient had palpated.  This was BI-RADS 0, needs additional assessment.  Patient then underwent bilateral breast ultrasound on 06/26/2024, this revealed a mostly round hypoechoic mass with a microlobulated margin and heterogeneous texture located in the right breast at the 11 o'clock position 4 cm from the nipple measuring 2.1 x 1.8 x 2.2 cm.  This was suspicious for malignancy.  There were no other masses or other findings that were suspicious.  The right axilla showed nonspecific benign-appearing lymph nodes without evidence of malignancy.  This was read as BI-RADS 4, biopsy recommended.  Patient underwent a biopsy on 07/01/2024, pathology revealed invasive ductal carcinoma, grade 3.  Breast panel showed ER 89.9% positive, IN 32.6% positive, HER2 negative by IHC (0) with a Ki-67 of 81.8%.  The patient was then seen by Dr. Candelario Amin on 07/11/2024, plan was for  right-sided lumpectomy and sentinel lymph node.  She had a breast MRI on 07/18/2024 that showed a 2.8 x 2.4 x 2.3 cm round enhancing mass with microlobulated and spiculated margins located in the right breast at the 12 o'clock position 4 cm from the nipple, this was consistent with patient's biopsy-proven invasive ductal carcinoma.  There were no lymph nodes in the right axilla or in the internal mammary chain that were suspicious.  The patient then underwent a right breast lumpectomy with sentinel lymph node on 08/09/2024.  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  Superior margin reexcision was done and showed benign tissue.  There were 8 lymph nodes removed, 1 was positive for metastatic invasive ductal carcinoma.  Final pathologic stage was pT2, N1a, M0 stage II.  I initially saw the patient on 09/04/2024. Oncotype DX returned with a score of 44, greater than 12% chance of distant recurrence at 9 years with an AI or tamoxifen alone, and chemotherapy benefit.     CT chest, abdomen, and pelvis done on 10/02/2024 showed no evidence of metastatic disease in the chest, abdomen, or pelvis.    Echocardiogram done on 10/10/2024 showed an EF of 60-65%.    Adjuvant chemotherapy with dose dense AC started on 10/28/2024.     Interval History:   Patient presents to clinic for scheduled follow up appointment and treatment visit.  She is currently being treated with weekly taxol.  She is tolerating treatment well.  No new complaints other than an episode of shortness of breath on Friday and a lot of fatigue on Saturday.  She does continue with some fatigue..  Constipation remains manageable.  She denies any pain, nausea, shortness of breath, neuropathy.    Past Medical History:   Diagnosis Date    ADHD (attention deficit hyperactivity disorder)     Migraine       Past Surgical History:   Procedure Laterality Date    CERVICAL CONIZATION   W/ LASER  2013    FESS, USING COMPUTER-ASSISTED NAVIGATION, WITH  NASAL TURBINATE REDUCTION  2021    Dr. Clemente    FUNCTIONAL ENDOSCOPIC SINUS SURGERY (FESS)      MEDIPORT INSERTION, SINGLE Left 10/18/2024    US GUIDED BIOPSY BREAST RIGHT Right 07/01/2024    [facility] right breast lumpectomy with right axillary sentinel node biopsy Right 08/09/2024     Social History     Socioeconomic History    Marital status: Significant Other   Tobacco Use    Smoking status: Never     Passive exposure: Never    Smokeless tobacco: Never   Substance and Sexual Activity    Alcohol use: Not Currently    Drug use: Never    Sexual activity: Yes     Partners: Male     Birth control/protection: OCP     Social Drivers of Health     Financial Resource Strain: Low Risk  (12/9/2024)    Overall Financial Resource Strain (CARDIA)     Difficulty of Paying Living Expenses: Not very hard   Food Insecurity: No Food Insecurity (12/9/2024)    Hunger Vital Sign     Worried About Running Out of Food in the Last Year: Never true     Ran Out of Food in the Last Year: Never true   Transportation Needs: No Transportation Needs (2/6/2024)    PRAPARE - Transportation     Lack of Transportation (Medical): No     Lack of Transportation (Non-Medical): No   Physical Activity: Inactive (12/9/2024)    Exercise Vital Sign     Days of Exercise per Week: 0 days     Minutes of Exercise per Session: 0 min   Stress: Stress Concern Present (12/9/2024)    [employer] [place] [employer] - Occupational Stress Questionnaire     Feeling of Stress : To some extent   Housing Stability: Low Risk  (2/6/2024)    Housing Stability Vital Sign     Unable to Pay for Housing in the Last Year: No     Number of Places Lived in the Last Year: 1     Unstable Housing in the Last Year: No      Family History   Problem Relation Name Age of Onset    Ulcerative colitis Mother      Heart disease Father Anthony Rodney     Alcohol abuse Paternal Grandmother Briana clemente       Review of patient's allergies indicates:  No Known Allergies   Review of Systems    Constitutional:  Negative for appetite change and unexpected weight change.   HENT:  Negative for mouth sores.    Respiratory:  Negative for cough and shortness of breath.    Cardiovascular:  Negative for chest pain.   Gastrointestinal:  Negative for abdominal pain and diarrhea.   Genitourinary:  Negative for frequency.   Integumentary:  Negative for rash.   Hematological:  Negative for adenopathy.   Psychiatric/Behavioral:  The patient is nervous/anxious.          Objective:      Physical Exam  Vitals reviewed.   Constitutional:       General: She is not in acute distress.     Appearance: Normal appearance.   HENT:      Head: Normocephalic and atraumatic.      Nose: Nose normal.      Mouth/Throat:      Mouth: Mucous membranes are moist.   Eyes:      Extraocular Movements: Extraocular movements intact.      Conjunctiva/sclera: Conjunctivae normal.   Cardiovascular:      Rate and Rhythm: Normal rate and regular rhythm.      Pulses: Normal pulses.      Heart sounds: Normal heart sounds.   Pulmonary:      Effort: Pulmonary effort is normal.      Breath sounds: Normal breath sounds.   Musculoskeletal:         General: No swelling. Normal range of motion.      Cervical back: Normal range of motion and neck supple.      Right lower leg: No edema.      Left lower leg: No edema.   Skin:     General: Skin is warm and dry.   Neurological:      General: No focal deficit present.      Mental Status: She is alert and oriented to person, place, and time. Mental status is at baseline.   Psychiatric:         Mood and Affect: Mood normal.         Behavior: Behavior normal.         LABS AND IMAGING REVIEWED IN EPIC          Assessment:   pT2, N1a, M0 stage II invasive ductal carcinoma, grade 3, ER 89.9% positive, MD 32.6% positive, HER2 Tyshawn negative by IHC (0) with a Ki-67 of 81.8%.        Plan:       We had a long conversation about the overall good prognosis.  We discussed Oncotype DX results.  Goal of treatment is cure.    I  recommended dose dense AC followed by weekly Taxol.    She will then need adjuvant radiation.    I explained that due to her hormone receptor positivity, she would need adjuvant endocrine therapy. I would recommend 10 years of therapy due to bonilla involvement. I also discussed the possibility of ovarian suppression + aromatase inhibitor. She did inquire about having a tubal ligation and ablation, but now her plan is to have a bilateral oophorectomy in July 2025.    Discussed fertility, patient is done with child bearing.    Radiation oncology,  Prellop    CT chest, abdomen, and pelvis done on 10/02/2024 showed no evidence of disease.    Echocardiogram done on 10/10/2024 showed an EF of 60-65%.    Started AC in a dose dense fashion every 2 weeks on 10/28/2024.  She has completed her 4 doses and is now receiving weekly Taxol.      Anemia mild and stable, continue to monitor.    Lactulose for her to use as needed for constipation.    She can use omeprazole, Pepcid, Tums for heartburn.    Okay to proceed with cycle 12 next week  We will give MVI tomorrow    Labs and Taxol weekly    She will return to clinic in 3-4 weeks with labs     Visit today included increased complexity associated with the care of the episodic problem breast cancer on chemotherapy, addressing and managing the longitudinal care of the patient's breast cancer.         HOMER Mulligan

## 2025-02-18 ENCOUNTER — INFUSION (OUTPATIENT)
Dept: INFUSION THERAPY | Facility: HOSPITAL | Age: 40
End: 2025-02-18
Attending: INTERNAL MEDICINE
Payer: COMMERCIAL

## 2025-02-18 ENCOUNTER — PATIENT MESSAGE (OUTPATIENT)
Dept: FAMILY MEDICINE | Facility: CLINIC | Age: 40
End: 2025-02-18
Payer: COMMERCIAL

## 2025-02-18 VITALS
TEMPERATURE: 98 F | WEIGHT: 123.31 LBS | BODY MASS INDEX: 22.55 KG/M2 | OXYGEN SATURATION: 100 % | DIASTOLIC BLOOD PRESSURE: 71 MMHG | HEART RATE: 91 BPM | RESPIRATION RATE: 16 BRPM | SYSTOLIC BLOOD PRESSURE: 114 MMHG

## 2025-02-18 DIAGNOSIS — R53.1 WEAKNESS: Primary | ICD-10-CM

## 2025-02-18 PROCEDURE — 25000003 PHARM REV CODE 250: Performed by: NURSE PRACTITIONER

## 2025-02-18 PROCEDURE — A4216 STERILE WATER/SALINE, 10 ML: HCPCS | Performed by: NURSE PRACTITIONER

## 2025-02-18 PROCEDURE — 63600175 PHARM REV CODE 636 W HCPCS: Performed by: NURSE PRACTITIONER

## 2025-02-18 PROCEDURE — 96365 THER/PROPH/DIAG IV INF INIT: CPT

## 2025-02-18 PROCEDURE — 96366 THER/PROPH/DIAG IV INF ADDON: CPT

## 2025-02-18 RX ORDER — SODIUM CHLORIDE 0.9 % (FLUSH) 0.9 %
10 SYRINGE (ML) INJECTION
Status: DISCONTINUED | OUTPATIENT
Start: 2025-02-18 | End: 2025-02-18 | Stop reason: HOSPADM

## 2025-02-18 RX ORDER — SODIUM CHLORIDE 0.9 % (FLUSH) 0.9 %
10 SYRINGE (ML) INJECTION
OUTPATIENT
Start: 2025-02-18

## 2025-02-18 RX ORDER — HEPARIN 100 UNIT/ML
500 SYRINGE INTRAVENOUS
OUTPATIENT
Start: 2025-02-18

## 2025-02-18 RX ORDER — HEPARIN 100 UNIT/ML
500 SYRINGE INTRAVENOUS
Status: DISCONTINUED | OUTPATIENT
Start: 2025-02-18 | End: 2025-02-18 | Stop reason: HOSPADM

## 2025-02-18 RX ADMIN — HEPARIN 500 UNITS: 100 SYRINGE at 09:02

## 2025-02-18 RX ADMIN — FOLIC ACID: 5 INJECTION, SOLUTION INTRAMUSCULAR; INTRAVENOUS; SUBCUTANEOUS at 07:02

## 2025-02-18 RX ADMIN — Medication 10 ML: at 09:02

## 2025-02-18 NOTE — NURSING
Pt tolerated treatment with no complaints.  Next appt reviewed with pt. Nicole needle removed from mediport. Site without signs and symptoms of complications. Dressing applied.

## 2025-02-21 ENCOUNTER — LAB VISIT (OUTPATIENT)
Dept: LAB | Facility: HOSPITAL | Age: 40
End: 2025-02-21
Attending: INTERNAL MEDICINE
Payer: COMMERCIAL

## 2025-02-21 DIAGNOSIS — C77.3 MALIGNANT NEOPLASM METASTATIC TO LYMPH NODE OF AXILLA: ICD-10-CM

## 2025-02-21 DIAGNOSIS — C50.911 INFILTRATING DUCTAL CARCINOMA OF RIGHT BREAST: ICD-10-CM

## 2025-02-21 DIAGNOSIS — Z79.899 ON ANTINEOPLASTIC CHEMOTHERAPY: ICD-10-CM

## 2025-02-21 LAB
ALBUMIN SERPL-MCNC: 3.9 G/DL (ref 3.5–5)
ALBUMIN/GLOB SERPL: 1.3 RATIO (ref 1.1–2)
ALP SERPL-CCNC: 60 UNIT/L (ref 40–150)
ALT SERPL-CCNC: 30 UNIT/L (ref 0–55)
ANION GAP SERPL CALC-SCNC: 9 MEQ/L
AST SERPL-CCNC: 39 UNIT/L (ref 5–34)
B-HCG UR QL: NEGATIVE
BASOPHILS # BLD AUTO: 0.01 X10(3)/MCL
BASOPHILS NFR BLD AUTO: 0.5 %
BILIRUB SERPL-MCNC: 0.3 MG/DL
BUN SERPL-MCNC: 14.9 MG/DL (ref 7–18.7)
CALCIUM SERPL-MCNC: 9.4 MG/DL (ref 8.4–10.2)
CHLORIDE SERPL-SCNC: 106 MMOL/L (ref 98–107)
CO2 SERPL-SCNC: 25 MMOL/L (ref 22–29)
CREAT SERPL-MCNC: 0.74 MG/DL (ref 0.55–1.02)
CREAT/UREA NIT SERPL: 20
EOSINOPHIL # BLD AUTO: 0.12 X10(3)/MCL (ref 0–0.9)
EOSINOPHIL NFR BLD AUTO: 5.8 %
ERYTHROCYTE [DISTWIDTH] IN BLOOD BY AUTOMATED COUNT: 13 % (ref 11.5–17)
GFR SERPLBLD CREATININE-BSD FMLA CKD-EPI: >60 ML/MIN/1.73/M2
GLOBULIN SER-MCNC: 2.9 GM/DL (ref 2.4–3.5)
GLUCOSE SERPL-MCNC: 90 MG/DL (ref 74–100)
HCT VFR BLD AUTO: 34.5 % (ref 37–47)
HGB BLD-MCNC: 11.6 G/DL (ref 12–16)
IMM GRANULOCYTES # BLD AUTO: 0 X10(3)/MCL (ref 0–0.04)
IMM GRANULOCYTES NFR BLD AUTO: 0 %
LYMPHOCYTES # BLD AUTO: 0.59 X10(3)/MCL (ref 0.6–4.6)
LYMPHOCYTES NFR BLD AUTO: 28.4 %
MAGNESIUM SERPL-MCNC: 1.9 MG/DL (ref 1.6–2.6)
MCH RBC QN AUTO: 32.1 PG (ref 27–31)
MCHC RBC AUTO-ENTMCNC: 33.6 G/DL (ref 33–36)
MCV RBC AUTO: 95.6 FL (ref 80–94)
MONOCYTES # BLD AUTO: 0.08 X10(3)/MCL (ref 0.1–1.3)
MONOCYTES NFR BLD AUTO: 3.8 %
NEUTROPHILS # BLD AUTO: 1.28 X10(3)/MCL (ref 2.1–9.2)
NEUTROPHILS NFR BLD AUTO: 61.5 %
PLATELET # BLD AUTO: 258 X10(3)/MCL (ref 130–400)
PMV BLD AUTO: 8.4 FL (ref 7.4–10.4)
POTASSIUM SERPL-SCNC: 3.9 MMOL/L (ref 3.5–5.1)
PROT SERPL-MCNC: 6.8 GM/DL (ref 6.4–8.3)
RBC # BLD AUTO: 3.61 X10(6)/MCL (ref 4.2–5.4)
SODIUM SERPL-SCNC: 140 MMOL/L (ref 136–145)
WBC # BLD AUTO: 2.08 X10(3)/MCL (ref 4.5–11.5)

## 2025-02-21 PROCEDURE — 81025 URINE PREGNANCY TEST: CPT

## 2025-02-21 PROCEDURE — 36415 COLL VENOUS BLD VENIPUNCTURE: CPT

## 2025-02-21 PROCEDURE — 83735 ASSAY OF MAGNESIUM: CPT

## 2025-02-21 PROCEDURE — 85025 COMPLETE CBC W/AUTO DIFF WBC: CPT

## 2025-02-21 PROCEDURE — 80053 COMPREHEN METABOLIC PANEL: CPT

## 2025-02-21 RX ORDER — DEXAMETHASONE SODIUM PHOSPHATE 10 MG/ML
10 INJECTION INTRAMUSCULAR; INTRAVENOUS
OUTPATIENT
Start: 2025-02-24

## 2025-02-21 RX ORDER — EPINEPHRINE 0.3 MG/.3ML
0.3 INJECTION SUBCUTANEOUS ONCE AS NEEDED
OUTPATIENT
Start: 2025-02-24

## 2025-02-21 RX ORDER — SODIUM CHLORIDE 0.9 % (FLUSH) 0.9 %
10 SYRINGE (ML) INJECTION
OUTPATIENT
Start: 2025-02-24

## 2025-02-21 RX ORDER — HEPARIN 100 UNIT/ML
500 SYRINGE INTRAVENOUS
OUTPATIENT
Start: 2025-02-24

## 2025-02-21 RX ORDER — FAMOTIDINE 10 MG/ML
20 INJECTION INTRAVENOUS
OUTPATIENT
Start: 2025-02-24

## 2025-02-21 RX ORDER — DIPHENHYDRAMINE HYDROCHLORIDE 50 MG/ML
50 INJECTION INTRAMUSCULAR; INTRAVENOUS ONCE AS NEEDED
OUTPATIENT
Start: 2025-02-24

## 2025-02-23 DIAGNOSIS — F90.0 ATTENTION DEFICIT HYPERACTIVITY DISORDER (ADHD), PREDOMINANTLY INATTENTIVE TYPE: ICD-10-CM

## 2025-02-24 ENCOUNTER — INFUSION (OUTPATIENT)
Dept: INFUSION THERAPY | Facility: HOSPITAL | Age: 40
End: 2025-02-24
Attending: INTERNAL MEDICINE
Payer: COMMERCIAL

## 2025-02-24 VITALS — DIASTOLIC BLOOD PRESSURE: 93 MMHG | TEMPERATURE: 98 F | HEART RATE: 114 BPM | SYSTOLIC BLOOD PRESSURE: 135 MMHG

## 2025-02-24 DIAGNOSIS — C77.3 MALIGNANT NEOPLASM METASTATIC TO LYMPH NODE OF AXILLA: Primary | ICD-10-CM

## 2025-02-24 DIAGNOSIS — C50.911 INFILTRATING DUCTAL CARCINOMA OF RIGHT BREAST: ICD-10-CM

## 2025-02-24 PROCEDURE — 63600175 PHARM REV CODE 636 W HCPCS: Performed by: NURSE PRACTITIONER

## 2025-02-24 PROCEDURE — 96413 CHEMO IV INFUSION 1 HR: CPT

## 2025-02-24 PROCEDURE — 96375 TX/PRO/DX INJ NEW DRUG ADDON: CPT

## 2025-02-24 PROCEDURE — 25000003 PHARM REV CODE 250: Performed by: NURSE PRACTITIONER

## 2025-02-24 RX ORDER — HEPARIN 100 UNIT/ML
500 SYRINGE INTRAVENOUS
Status: DISCONTINUED | OUTPATIENT
Start: 2025-02-24 | End: 2025-02-24 | Stop reason: HOSPADM

## 2025-02-24 RX ORDER — DIPHENHYDRAMINE HYDROCHLORIDE 50 MG/ML
50 INJECTION INTRAMUSCULAR; INTRAVENOUS ONCE AS NEEDED
Status: DISCONTINUED | OUTPATIENT
Start: 2025-02-24 | End: 2025-02-24 | Stop reason: HOSPADM

## 2025-02-24 RX ORDER — FAMOTIDINE 10 MG/ML
20 INJECTION INTRAVENOUS
Status: COMPLETED | OUTPATIENT
Start: 2025-02-24 | End: 2025-02-24

## 2025-02-24 RX ORDER — SODIUM CHLORIDE 0.9 % (FLUSH) 0.9 %
10 SYRINGE (ML) INJECTION
Status: DISCONTINUED | OUTPATIENT
Start: 2025-02-24 | End: 2025-02-24 | Stop reason: HOSPADM

## 2025-02-24 RX ORDER — DEXAMETHASONE SODIUM PHOSPHATE 10 MG/ML
10 INJECTION, SOLUTION INTRA-ARTICULAR; INTRALESIONAL; INTRAMUSCULAR; INTRAVENOUS; SOFT TISSUE
Status: COMPLETED | OUTPATIENT
Start: 2025-02-24 | End: 2025-02-24

## 2025-02-24 RX ORDER — EPINEPHRINE 0.3 MG/.3ML
0.3 INJECTION SUBCUTANEOUS ONCE AS NEEDED
Status: DISCONTINUED | OUTPATIENT
Start: 2025-02-24 | End: 2025-02-24 | Stop reason: HOSPADM

## 2025-02-24 RX ORDER — DEXTROAMPHETAMINE SACCHARATE, AMPHETAMINE ASPARTATE, DEXTROAMPHETAMINE SULFATE AND AMPHETAMINE SULFATE 5; 5; 5; 5 MG/1; MG/1; MG/1; MG/1
1 TABLET ORAL 3 TIMES DAILY
Qty: 90 TABLET | Refills: 0 | Status: SHIPPED | OUTPATIENT
Start: 2025-02-24

## 2025-02-24 RX ADMIN — PACLITAXEL 120 MG: 6 INJECTION, SOLUTION INTRAVENOUS at 02:02

## 2025-02-24 RX ADMIN — DIPHENHYDRAMINE HYDROCHLORIDE 50 MG: 50 INJECTION INTRAMUSCULAR; INTRAVENOUS at 02:02

## 2025-02-24 RX ADMIN — FAMOTIDINE 20 MG: 10 INJECTION, SOLUTION INTRAVENOUS at 02:02

## 2025-02-24 RX ADMIN — SODIUM CHLORIDE: 9 INJECTION, SOLUTION INTRAVENOUS at 03:02

## 2025-02-24 RX ADMIN — DEXAMETHASONE SODIUM PHOSPHATE 10 MG: 10 INJECTION, SOLUTION INTRAMUSCULAR; INTRAVENOUS at 02:02

## 2025-02-24 RX ADMIN — HEPARIN 500 UNITS: 100 SYRINGE at 03:02

## 2025-02-26 ENCOUNTER — INFUSION (OUTPATIENT)
Dept: INFUSION THERAPY | Facility: HOSPITAL | Age: 40
End: 2025-02-26
Attending: INTERNAL MEDICINE
Payer: COMMERCIAL

## 2025-02-26 VITALS
SYSTOLIC BLOOD PRESSURE: 112 MMHG | HEART RATE: 86 BPM | DIASTOLIC BLOOD PRESSURE: 77 MMHG | OXYGEN SATURATION: 100 % | RESPIRATION RATE: 16 BRPM | TEMPERATURE: 98 F

## 2025-02-26 DIAGNOSIS — R53.1 WEAKNESS: Primary | ICD-10-CM

## 2025-02-26 PROCEDURE — 96365 THER/PROPH/DIAG IV INF INIT: CPT

## 2025-02-26 PROCEDURE — 63600175 PHARM REV CODE 636 W HCPCS: Performed by: NURSE PRACTITIONER

## 2025-02-26 PROCEDURE — 25000003 PHARM REV CODE 250: Performed by: NURSE PRACTITIONER

## 2025-02-26 RX ORDER — HEPARIN 100 UNIT/ML
500 SYRINGE INTRAVENOUS
Status: DISCONTINUED | OUTPATIENT
Start: 2025-02-26 | End: 2025-02-26 | Stop reason: HOSPADM

## 2025-02-26 RX ORDER — SODIUM CHLORIDE 0.9 % (FLUSH) 0.9 %
10 SYRINGE (ML) INJECTION
Status: DISCONTINUED | OUTPATIENT
Start: 2025-02-26 | End: 2025-02-26 | Stop reason: HOSPADM

## 2025-02-26 RX ORDER — HEPARIN 100 UNIT/ML
500 SYRINGE INTRAVENOUS
OUTPATIENT
Start: 2025-02-26

## 2025-02-26 RX ORDER — SODIUM CHLORIDE 0.9 % (FLUSH) 0.9 %
10 SYRINGE (ML) INJECTION
OUTPATIENT
Start: 2025-02-26

## 2025-02-26 RX ADMIN — HEPARIN 500 UNITS: 100 SYRINGE at 12:02

## 2025-02-26 RX ADMIN — FOLIC ACID: 5 INJECTION, SOLUTION INTRAMUSCULAR; INTRAVENOUS; SUBCUTANEOUS at 11:02

## 2025-02-28 ENCOUNTER — LAB VISIT (OUTPATIENT)
Dept: LAB | Facility: HOSPITAL | Age: 40
End: 2025-02-28
Attending: NURSE PRACTITIONER
Payer: COMMERCIAL

## 2025-02-28 DIAGNOSIS — C77.3 MALIGNANT NEOPLASM METASTATIC TO LYMPH NODE OF AXILLA: ICD-10-CM

## 2025-02-28 DIAGNOSIS — C50.911 INFILTRATING DUCTAL CARCINOMA OF RIGHT BREAST: ICD-10-CM

## 2025-02-28 DIAGNOSIS — R53.1 WEAKNESS: ICD-10-CM

## 2025-02-28 LAB
ALBUMIN SERPL-MCNC: 3.6 G/DL (ref 3.5–5)
ALBUMIN/GLOB SERPL: 1.3 RATIO (ref 1.1–2)
ALP SERPL-CCNC: 50 UNIT/L (ref 40–150)
ALT SERPL-CCNC: 36 UNIT/L (ref 0–55)
ANION GAP SERPL CALC-SCNC: 7 MEQ/L
AST SERPL-CCNC: 40 UNIT/L (ref 5–34)
BASOPHILS # BLD AUTO: 0.01 X10(3)/MCL
BASOPHILS NFR BLD AUTO: 0.4 %
BILIRUB SERPL-MCNC: 0.6 MG/DL
BUN SERPL-MCNC: 12.6 MG/DL (ref 7–18.7)
CALCIUM SERPL-MCNC: 9.1 MG/DL (ref 8.4–10.2)
CHLORIDE SERPL-SCNC: 108 MMOL/L (ref 98–107)
CO2 SERPL-SCNC: 25 MMOL/L (ref 22–29)
CREAT SERPL-MCNC: 0.71 MG/DL (ref 0.55–1.02)
CREAT/UREA NIT SERPL: 18
EOSINOPHIL # BLD AUTO: 0.07 X10(3)/MCL (ref 0–0.9)
EOSINOPHIL NFR BLD AUTO: 3 %
ERYTHROCYTE [DISTWIDTH] IN BLOOD BY AUTOMATED COUNT: 13.1 % (ref 11.5–17)
GFR SERPLBLD CREATININE-BSD FMLA CKD-EPI: >60 ML/MIN/1.73/M2
GLOBULIN SER-MCNC: 2.8 GM/DL (ref 2.4–3.5)
GLUCOSE SERPL-MCNC: 100 MG/DL (ref 74–100)
HCT VFR BLD AUTO: 33.6 % (ref 37–47)
HGB BLD-MCNC: 11.4 G/DL (ref 12–16)
IMM GRANULOCYTES # BLD AUTO: 0.01 X10(3)/MCL (ref 0–0.04)
IMM GRANULOCYTES NFR BLD AUTO: 0.4 %
LYMPHOCYTES # BLD AUTO: 0.57 X10(3)/MCL (ref 0.6–4.6)
LYMPHOCYTES NFR BLD AUTO: 24.7 %
MAGNESIUM SERPL-MCNC: 1.9 MG/DL (ref 1.6–2.6)
MCH RBC QN AUTO: 32 PG (ref 27–31)
MCHC RBC AUTO-ENTMCNC: 33.9 G/DL (ref 33–36)
MCV RBC AUTO: 94.4 FL (ref 80–94)
MONOCYTES # BLD AUTO: 0.14 X10(3)/MCL (ref 0.1–1.3)
MONOCYTES NFR BLD AUTO: 6.1 %
NEUTROPHILS # BLD AUTO: 1.51 X10(3)/MCL (ref 2.1–9.2)
NEUTROPHILS NFR BLD AUTO: 65.4 %
PLATELET # BLD AUTO: 228 X10(3)/MCL (ref 130–400)
PMV BLD AUTO: 7.9 FL (ref 7.4–10.4)
POTASSIUM SERPL-SCNC: 4.2 MMOL/L (ref 3.5–5.1)
PROT SERPL-MCNC: 6.4 GM/DL (ref 6.4–8.3)
RBC # BLD AUTO: 3.56 X10(6)/MCL (ref 4.2–5.4)
SODIUM SERPL-SCNC: 140 MMOL/L (ref 136–145)
WBC # BLD AUTO: 2.31 X10(3)/MCL (ref 4.5–11.5)

## 2025-02-28 PROCEDURE — 36415 COLL VENOUS BLD VENIPUNCTURE: CPT

## 2025-02-28 PROCEDURE — 83735 ASSAY OF MAGNESIUM: CPT

## 2025-02-28 PROCEDURE — 85025 COMPLETE CBC W/AUTO DIFF WBC: CPT

## 2025-02-28 PROCEDURE — 80053 COMPREHEN METABOLIC PANEL: CPT

## 2025-02-28 RX ORDER — EPINEPHRINE 0.3 MG/.3ML
0.3 INJECTION SUBCUTANEOUS ONCE AS NEEDED
OUTPATIENT
Start: 2025-03-03

## 2025-02-28 RX ORDER — DIPHENHYDRAMINE HYDROCHLORIDE 50 MG/ML
50 INJECTION INTRAMUSCULAR; INTRAVENOUS ONCE AS NEEDED
OUTPATIENT
Start: 2025-03-03

## 2025-02-28 RX ORDER — DEXAMETHASONE SODIUM PHOSPHATE 10 MG/ML
10 INJECTION INTRAMUSCULAR; INTRAVENOUS
OUTPATIENT
Start: 2025-03-03

## 2025-02-28 RX ORDER — SODIUM CHLORIDE 0.9 % (FLUSH) 0.9 %
10 SYRINGE (ML) INJECTION
OUTPATIENT
Start: 2025-03-03

## 2025-02-28 RX ORDER — HEPARIN 100 UNIT/ML
500 SYRINGE INTRAVENOUS
OUTPATIENT
Start: 2025-03-03

## 2025-02-28 RX ORDER — FAMOTIDINE 10 MG/ML
20 INJECTION INTRAVENOUS
OUTPATIENT
Start: 2025-03-03

## 2025-03-03 ENCOUNTER — INFUSION (OUTPATIENT)
Dept: INFUSION THERAPY | Facility: HOSPITAL | Age: 40
End: 2025-03-03
Attending: INTERNAL MEDICINE
Payer: COMMERCIAL

## 2025-03-03 VITALS
HEART RATE: 101 BPM | DIASTOLIC BLOOD PRESSURE: 86 MMHG | TEMPERATURE: 98 F | BODY MASS INDEX: 22.6 KG/M2 | SYSTOLIC BLOOD PRESSURE: 131 MMHG | HEIGHT: 62 IN | WEIGHT: 122.81 LBS

## 2025-03-03 DIAGNOSIS — C77.3 MALIGNANT NEOPLASM METASTATIC TO LYMPH NODE OF AXILLA: Primary | ICD-10-CM

## 2025-03-03 DIAGNOSIS — C50.911 INFILTRATING DUCTAL CARCINOMA OF RIGHT BREAST: ICD-10-CM

## 2025-03-03 PROCEDURE — 96413 CHEMO IV INFUSION 1 HR: CPT

## 2025-03-03 PROCEDURE — 96367 TX/PROPH/DG ADDL SEQ IV INF: CPT

## 2025-03-03 PROCEDURE — 63600175 PHARM REV CODE 636 W HCPCS: Performed by: NURSE PRACTITIONER

## 2025-03-03 PROCEDURE — 96375 TX/PRO/DX INJ NEW DRUG ADDON: CPT

## 2025-03-03 PROCEDURE — 25000003 PHARM REV CODE 250: Performed by: NURSE PRACTITIONER

## 2025-03-03 RX ORDER — SODIUM CHLORIDE 0.9 % (FLUSH) 0.9 %
10 SYRINGE (ML) INJECTION
Status: DISCONTINUED | OUTPATIENT
Start: 2025-03-03 | End: 2025-03-03 | Stop reason: HOSPADM

## 2025-03-03 RX ORDER — HEPARIN 100 UNIT/ML
500 SYRINGE INTRAVENOUS
Status: DISCONTINUED | OUTPATIENT
Start: 2025-03-03 | End: 2025-03-03 | Stop reason: HOSPADM

## 2025-03-03 RX ORDER — DIPHENHYDRAMINE HYDROCHLORIDE 50 MG/ML
50 INJECTION INTRAMUSCULAR; INTRAVENOUS ONCE AS NEEDED
Status: DISCONTINUED | OUTPATIENT
Start: 2025-03-03 | End: 2025-03-03 | Stop reason: HOSPADM

## 2025-03-03 RX ORDER — EPINEPHRINE 0.3 MG/.3ML
0.3 INJECTION SUBCUTANEOUS ONCE AS NEEDED
Status: DISCONTINUED | OUTPATIENT
Start: 2025-03-03 | End: 2025-03-03 | Stop reason: HOSPADM

## 2025-03-03 RX ORDER — FAMOTIDINE 10 MG/ML
20 INJECTION INTRAVENOUS
Status: COMPLETED | OUTPATIENT
Start: 2025-03-03 | End: 2025-03-03

## 2025-03-03 RX ORDER — DEXAMETHASONE SODIUM PHOSPHATE 10 MG/ML
10 INJECTION, SOLUTION INTRA-ARTICULAR; INTRALESIONAL; INTRAMUSCULAR; INTRAVENOUS; SOFT TISSUE
Status: COMPLETED | OUTPATIENT
Start: 2025-03-03 | End: 2025-03-03

## 2025-03-03 RX ADMIN — DIPHENHYDRAMINE HYDROCHLORIDE 50 MG: 50 INJECTION INTRAMUSCULAR; INTRAVENOUS at 02:03

## 2025-03-03 RX ADMIN — DEXAMETHASONE SODIUM PHOSPHATE 10 MG: 10 INJECTION, SOLUTION INTRAMUSCULAR; INTRAVENOUS at 02:03

## 2025-03-03 RX ADMIN — FAMOTIDINE 20 MG: 10 INJECTION, SOLUTION INTRAVENOUS at 02:03

## 2025-03-03 RX ADMIN — PACLITAXEL 120 MG: 6 INJECTION, SOLUTION INTRAVENOUS at 03:03

## 2025-03-04 ENCOUNTER — PATIENT MESSAGE (OUTPATIENT)
Dept: HEMATOLOGY/ONCOLOGY | Facility: CLINIC | Age: 40
End: 2025-03-04
Payer: COMMERCIAL

## 2025-03-05 ENCOUNTER — TELEPHONE (OUTPATIENT)
Dept: FAMILY MEDICINE | Facility: CLINIC | Age: 40
End: 2025-03-05
Payer: COMMERCIAL

## 2025-03-05 ENCOUNTER — PATIENT MESSAGE (OUTPATIENT)
Dept: HEMATOLOGY/ONCOLOGY | Facility: CLINIC | Age: 40
End: 2025-03-05
Payer: COMMERCIAL

## 2025-03-06 ENCOUNTER — LAB VISIT (OUTPATIENT)
Dept: LAB | Facility: HOSPITAL | Age: 40
End: 2025-03-06
Attending: INTERNAL MEDICINE
Payer: COMMERCIAL

## 2025-03-06 ENCOUNTER — INFUSION (OUTPATIENT)
Dept: INFUSION THERAPY | Facility: HOSPITAL | Age: 40
End: 2025-03-06
Attending: INTERNAL MEDICINE
Payer: COMMERCIAL

## 2025-03-06 VITALS
TEMPERATURE: 98 F | SYSTOLIC BLOOD PRESSURE: 115 MMHG | HEART RATE: 87 BPM | WEIGHT: 126.5 LBS | BODY MASS INDEX: 23.28 KG/M2 | DIASTOLIC BLOOD PRESSURE: 63 MMHG | HEIGHT: 62 IN | RESPIRATION RATE: 18 BRPM | OXYGEN SATURATION: 100 %

## 2025-03-06 DIAGNOSIS — C77.3 MALIGNANT NEOPLASM METASTATIC TO LYMPH NODE OF AXILLA: ICD-10-CM

## 2025-03-06 DIAGNOSIS — R53.1 WEAKNESS: ICD-10-CM

## 2025-03-06 DIAGNOSIS — C50.911 INFILTRATING DUCTAL CARCINOMA OF RIGHT BREAST: ICD-10-CM

## 2025-03-06 DIAGNOSIS — R53.1 WEAKNESS: Primary | ICD-10-CM

## 2025-03-06 LAB
ALBUMIN SERPL-MCNC: 3.8 G/DL (ref 3.5–5)
ALBUMIN/GLOB SERPL: 1.3 RATIO (ref 1.1–2)
ALP SERPL-CCNC: 55 UNIT/L (ref 40–150)
ALT SERPL-CCNC: 26 UNIT/L (ref 0–55)
ANION GAP SERPL CALC-SCNC: 10 MEQ/L
AST SERPL-CCNC: 35 UNIT/L (ref 5–34)
B-HCG UR QL: NEGATIVE
BASOPHILS # BLD AUTO: 0.01 X10(3)/MCL
BASOPHILS NFR BLD AUTO: 0.3 %
BILIRUB SERPL-MCNC: 0.4 MG/DL
BUN SERPL-MCNC: 14.6 MG/DL (ref 7–18.7)
CALCIUM SERPL-MCNC: 9.4 MG/DL (ref 8.4–10.2)
CHLORIDE SERPL-SCNC: 106 MMOL/L (ref 98–107)
CO2 SERPL-SCNC: 25 MMOL/L (ref 22–29)
CREAT SERPL-MCNC: 0.76 MG/DL (ref 0.55–1.02)
CREAT/UREA NIT SERPL: 19
CTP QC/QA: YES
EOSINOPHIL # BLD AUTO: 0.07 X10(3)/MCL (ref 0–0.9)
EOSINOPHIL NFR BLD AUTO: 2.2 %
ERYTHROCYTE [DISTWIDTH] IN BLOOD BY AUTOMATED COUNT: 13.1 % (ref 11.5–17)
GFR SERPLBLD CREATININE-BSD FMLA CKD-EPI: >60 ML/MIN/1.73/M2
GLOBULIN SER-MCNC: 2.9 GM/DL (ref 2.4–3.5)
GLUCOSE SERPL-MCNC: 100 MG/DL (ref 74–100)
HCT VFR BLD AUTO: 33.7 % (ref 37–47)
HGB BLD-MCNC: 11.5 G/DL (ref 12–16)
IMM GRANULOCYTES # BLD AUTO: 0 X10(3)/MCL (ref 0–0.04)
IMM GRANULOCYTES NFR BLD AUTO: 0 %
LYMPHOCYTES # BLD AUTO: 0.86 X10(3)/MCL (ref 0.6–4.6)
LYMPHOCYTES NFR BLD AUTO: 27.5 %
MAGNESIUM SERPL-MCNC: 1.8 MG/DL (ref 1.6–2.6)
MCH RBC QN AUTO: 32.4 PG (ref 27–31)
MCHC RBC AUTO-ENTMCNC: 34.1 G/DL (ref 33–36)
MCV RBC AUTO: 94.9 FL (ref 80–94)
MONOCYTES # BLD AUTO: 0.13 X10(3)/MCL (ref 0.1–1.3)
MONOCYTES NFR BLD AUTO: 4.2 %
NEUTROPHILS # BLD AUTO: 2.06 X10(3)/MCL (ref 2.1–9.2)
NEUTROPHILS NFR BLD AUTO: 65.8 %
PLATELET # BLD AUTO: 281 X10(3)/MCL (ref 130–400)
PMV BLD AUTO: 8.3 FL (ref 7.4–10.4)
POTASSIUM SERPL-SCNC: 4.2 MMOL/L (ref 3.5–5.1)
PROT SERPL-MCNC: 6.7 GM/DL (ref 6.4–8.3)
RBC # BLD AUTO: 3.55 X10(6)/MCL (ref 4.2–5.4)
SODIUM SERPL-SCNC: 141 MMOL/L (ref 136–145)
WBC # BLD AUTO: 3.13 X10(3)/MCL (ref 4.5–11.5)

## 2025-03-06 PROCEDURE — 85025 COMPLETE CBC W/AUTO DIFF WBC: CPT

## 2025-03-06 PROCEDURE — 83735 ASSAY OF MAGNESIUM: CPT

## 2025-03-06 PROCEDURE — 36415 COLL VENOUS BLD VENIPUNCTURE: CPT

## 2025-03-06 PROCEDURE — 81025 URINE PREGNANCY TEST: CPT

## 2025-03-06 PROCEDURE — 25000003 PHARM REV CODE 250: Performed by: NURSE PRACTITIONER

## 2025-03-06 PROCEDURE — 63600175 PHARM REV CODE 636 W HCPCS: Performed by: NURSE PRACTITIONER

## 2025-03-06 PROCEDURE — A4216 STERILE WATER/SALINE, 10 ML: HCPCS | Performed by: NURSE PRACTITIONER

## 2025-03-06 PROCEDURE — 96366 THER/PROPH/DIAG IV INF ADDON: CPT

## 2025-03-06 PROCEDURE — 96365 THER/PROPH/DIAG IV INF INIT: CPT

## 2025-03-06 PROCEDURE — 80053 COMPREHEN METABOLIC PANEL: CPT

## 2025-03-06 RX ORDER — HEPARIN 100 UNIT/ML
500 SYRINGE INTRAVENOUS
OUTPATIENT
Start: 2025-03-06

## 2025-03-06 RX ORDER — SODIUM CHLORIDE 0.9 % (FLUSH) 0.9 %
10 SYRINGE (ML) INJECTION
Status: DISCONTINUED | OUTPATIENT
Start: 2025-03-06 | End: 2025-03-06 | Stop reason: HOSPADM

## 2025-03-06 RX ORDER — HEPARIN 100 UNIT/ML
500 SYRINGE INTRAVENOUS
Status: DISCONTINUED | OUTPATIENT
Start: 2025-03-06 | End: 2025-03-06 | Stop reason: HOSPADM

## 2025-03-06 RX ORDER — SODIUM CHLORIDE 0.9 % (FLUSH) 0.9 %
10 SYRINGE (ML) INJECTION
OUTPATIENT
Start: 2025-03-06

## 2025-03-06 RX ADMIN — Medication 10 ML: at 04:03

## 2025-03-06 RX ADMIN — HEPARIN 500 UNITS: 100 SYRINGE at 04:03

## 2025-03-06 RX ADMIN — FOLIC ACID: 5 INJECTION, SOLUTION INTRAMUSCULAR; INTRAVENOUS; SUBCUTANEOUS at 02:03

## 2025-03-06 NOTE — PLAN OF CARE
Plan of care reviewed with patient. MVI weekly completed. Appts reviewed with patient; patient uses portal.

## 2025-03-07 RX ORDER — DIPHENHYDRAMINE HYDROCHLORIDE 50 MG/ML
50 INJECTION INTRAMUSCULAR; INTRAVENOUS ONCE AS NEEDED
OUTPATIENT
Start: 2025-03-10

## 2025-03-07 RX ORDER — DEXAMETHASONE SODIUM PHOSPHATE 10 MG/ML
10 INJECTION INTRAMUSCULAR; INTRAVENOUS
OUTPATIENT
Start: 2025-03-10

## 2025-03-07 RX ORDER — SODIUM CHLORIDE 0.9 % (FLUSH) 0.9 %
10 SYRINGE (ML) INJECTION
OUTPATIENT
Start: 2025-03-10

## 2025-03-07 RX ORDER — EPINEPHRINE 0.3 MG/.3ML
0.3 INJECTION SUBCUTANEOUS ONCE AS NEEDED
OUTPATIENT
Start: 2025-03-10

## 2025-03-07 RX ORDER — HEPARIN 100 UNIT/ML
500 SYRINGE INTRAVENOUS
OUTPATIENT
Start: 2025-03-10

## 2025-03-07 RX ORDER — FAMOTIDINE 10 MG/ML
20 INJECTION INTRAVENOUS
OUTPATIENT
Start: 2025-03-10

## 2025-03-10 ENCOUNTER — INFUSION (OUTPATIENT)
Dept: INFUSION THERAPY | Facility: HOSPITAL | Age: 40
End: 2025-03-10
Attending: INTERNAL MEDICINE
Payer: COMMERCIAL

## 2025-03-10 VITALS — DIASTOLIC BLOOD PRESSURE: 89 MMHG | SYSTOLIC BLOOD PRESSURE: 136 MMHG | HEART RATE: 92 BPM

## 2025-03-10 DIAGNOSIS — C77.3 MALIGNANT NEOPLASM METASTATIC TO LYMPH NODE OF AXILLA: Primary | ICD-10-CM

## 2025-03-10 DIAGNOSIS — C50.911 INFILTRATING DUCTAL CARCINOMA OF RIGHT BREAST: ICD-10-CM

## 2025-03-10 PROCEDURE — 96413 CHEMO IV INFUSION 1 HR: CPT

## 2025-03-10 PROCEDURE — 96367 TX/PROPH/DG ADDL SEQ IV INF: CPT

## 2025-03-10 PROCEDURE — 25000003 PHARM REV CODE 250: Performed by: NURSE PRACTITIONER

## 2025-03-10 PROCEDURE — 96375 TX/PRO/DX INJ NEW DRUG ADDON: CPT

## 2025-03-10 PROCEDURE — 63600175 PHARM REV CODE 636 W HCPCS: Performed by: NURSE PRACTITIONER

## 2025-03-10 RX ORDER — HEPARIN 100 UNIT/ML
500 SYRINGE INTRAVENOUS
Status: DISCONTINUED | OUTPATIENT
Start: 2025-03-10 | End: 2025-03-10 | Stop reason: HOSPADM

## 2025-03-10 RX ORDER — SODIUM CHLORIDE 0.9 % (FLUSH) 0.9 %
10 SYRINGE (ML) INJECTION
Status: DISCONTINUED | OUTPATIENT
Start: 2025-03-10 | End: 2025-03-10 | Stop reason: HOSPADM

## 2025-03-10 RX ORDER — EPINEPHRINE 0.3 MG/.3ML
0.3 INJECTION SUBCUTANEOUS ONCE AS NEEDED
Status: DISCONTINUED | OUTPATIENT
Start: 2025-03-10 | End: 2025-03-10 | Stop reason: HOSPADM

## 2025-03-10 RX ORDER — DIPHENHYDRAMINE HYDROCHLORIDE 50 MG/ML
50 INJECTION, SOLUTION INTRAMUSCULAR; INTRAVENOUS ONCE AS NEEDED
Status: DISCONTINUED | OUTPATIENT
Start: 2025-03-10 | End: 2025-03-10 | Stop reason: HOSPADM

## 2025-03-10 RX ORDER — FAMOTIDINE 10 MG/ML
20 INJECTION, SOLUTION INTRAVENOUS
Status: COMPLETED | OUTPATIENT
Start: 2025-03-10 | End: 2025-03-10

## 2025-03-10 RX ORDER — DEXAMETHASONE SODIUM PHOSPHATE 10 MG/ML
10 INJECTION, SOLUTION INTRA-ARTICULAR; INTRALESIONAL; INTRAMUSCULAR; INTRAVENOUS; SOFT TISSUE
Status: COMPLETED | OUTPATIENT
Start: 2025-03-10 | End: 2025-03-10

## 2025-03-10 RX ADMIN — SODIUM CHLORIDE 50 MG: 9 INJECTION, SOLUTION INTRAVENOUS at 02:03

## 2025-03-10 RX ADMIN — FAMOTIDINE 20 MG: 10 INJECTION, SOLUTION INTRAVENOUS at 02:03

## 2025-03-10 RX ADMIN — DEXAMETHASONE SODIUM PHOSPHATE 10 MG: 10 INJECTION, SOLUTION INTRAMUSCULAR; INTRAVENOUS at 02:03

## 2025-03-10 RX ADMIN — PACLITAXEL 120 MG: 6 INJECTION, SOLUTION INTRAVENOUS at 02:03

## 2025-03-12 ENCOUNTER — OFFICE VISIT (OUTPATIENT)
Dept: FAMILY MEDICINE | Facility: CLINIC | Age: 40
End: 2025-03-12
Payer: COMMERCIAL

## 2025-03-12 VITALS
DIASTOLIC BLOOD PRESSURE: 78 MMHG | TEMPERATURE: 98 F | SYSTOLIC BLOOD PRESSURE: 108 MMHG | HEART RATE: 83 BPM | HEIGHT: 62 IN | BODY MASS INDEX: 23.7 KG/M2 | WEIGHT: 128.81 LBS

## 2025-03-12 DIAGNOSIS — F90.0 ATTENTION DEFICIT HYPERACTIVITY DISORDER (ADHD), PREDOMINANTLY INATTENTIVE TYPE: Primary | ICD-10-CM

## 2025-03-12 DIAGNOSIS — G43.109 MIGRAINE WITH AURA AND WITHOUT STATUS MIGRAINOSUS, NOT INTRACTABLE: ICD-10-CM

## 2025-03-12 DIAGNOSIS — F41.1 ANXIETY STATE: ICD-10-CM

## 2025-03-12 DIAGNOSIS — G47.00 INSOMNIA, UNSPECIFIED TYPE: ICD-10-CM

## 2025-03-12 DIAGNOSIS — R23.2 HOT FLASHES: ICD-10-CM

## 2025-03-12 DIAGNOSIS — C50.911 INFILTRATING DUCTAL CARCINOMA OF RIGHT BREAST: ICD-10-CM

## 2025-03-12 DIAGNOSIS — C77.3 MALIGNANT NEOPLASM METASTATIC TO LYMPH NODE OF AXILLA: ICD-10-CM

## 2025-03-12 DIAGNOSIS — F32.A DEPRESSION, UNSPECIFIED DEPRESSION TYPE: ICD-10-CM

## 2025-03-12 DIAGNOSIS — G25.81 RLS (RESTLESS LEGS SYNDROME): ICD-10-CM

## 2025-03-12 PROCEDURE — 3078F DIAST BP <80 MM HG: CPT | Mod: CPTII,95,, | Performed by: NURSE PRACTITIONER

## 2025-03-12 PROCEDURE — 3074F SYST BP LT 130 MM HG: CPT | Mod: CPTII,95,, | Performed by: NURSE PRACTITIONER

## 2025-03-12 PROCEDURE — 1160F RVW MEDS BY RX/DR IN RCRD: CPT | Mod: CPTII,95,, | Performed by: NURSE PRACTITIONER

## 2025-03-12 PROCEDURE — 1159F MED LIST DOCD IN RCRD: CPT | Mod: CPTII,95,, | Performed by: NURSE PRACTITIONER

## 2025-03-12 PROCEDURE — G2211 COMPLEX E/M VISIT ADD ON: HCPCS | Mod: 95,,, | Performed by: NURSE PRACTITIONER

## 2025-03-12 PROCEDURE — 98006 SYNCH AUDIO-VIDEO EST MOD 30: CPT | Mod: 95,,, | Performed by: NURSE PRACTITIONER

## 2025-03-12 RX ORDER — PRAMIPEXOLE DIHYDROCHLORIDE 0.12 MG/1
0.25 TABLET ORAL NIGHTLY
Qty: 60 TABLET | Refills: 11 | Status: SHIPPED | OUTPATIENT
Start: 2025-03-12 | End: 2026-03-12

## 2025-03-12 NOTE — ASSESSMENT & PLAN NOTE
- Started Mirapex (Pramipexole) 0.125 mg at bedtime for restless legs.  - Take 1 tablet for about a week, then increase to 2 tablets if needed.  - Discussed importance of iron supplementation for managing restless leg syndrome.  - Recommend starting an iron supplement, such as a multivitamin with iron or tart cherry extract, to help with symptoms.

## 2025-03-12 NOTE — PROGRESS NOTES
TELEMEDICINE VISIT     Patient ID: Joo Haywood is a 39 y.o. female.  MRN: [id number]  : 1985    Subjective:        TELEMEDICINE  The patient location is: home  The chief complaint leading to consultation is: ADHD (3m fu), Anxiety (3m fu), Depression (3m fu), Migraine (3m fu), and Insomnia (3m fu)     Visit type: Virtual visit with synchronous audio and video    Total time spent with patient: 20 minutes  30 minutes of total time spent on the encounter, which includes face to face time and non-face to face time preparing to see the patient (eg, review of tests), obtaining and/or reviewing separately obtained history, documenting clinical information in the electronic or other health record, independently interpreting results (not separately reported) and communicating results to the patient/family/caregiver, or care coordination (not separately reported).    Each patient to whom he or she provides medical services by telemedicine is:  (1) informed of the relationship between the physician and patient and the respective role of any other health care provider with respect to management of the patient; and (2) notified that he or she may decline to receive medical services by telemedicine and may withdraw from such care at any time.    History of Present Illness    CHIEF COMPLAINT:  Patient seen today via virtual visit for three month follow-up of migraines, anxiety, ADHD, depression, and ongoing cancer treatment.    CANCER TREATMENT:  She has two remaining chemotherapy treatments with completion date scheduled for the . She experienced an adverse reaction to rapid steroid administration during chemotherapy with burning sensation and feeling unwell afterwards. She is receiving multivitamin infusions.    TREATMENT SIDE EFFECTS:  She reports severe hot flashes with excessive sweating down her spine, requiring changes of clothes and bed sheets. She has experienced amenorrhea since onset of  treatment-induced menopause. Her weight has increased from 118 to 128 lbs, though clothes continue to fit the same. She notes swelling in her hands.    MIGRAINES:  She reports improvement in migraines since starting chemotherapy with no episodes since treatment initiation.    SLEEP:  She reports poor sleep quality despite taking Xanax 2 tablets and Amitriptyline 10mg at bedtime. She experiences restless leg syndrome and feels tired throughout the day but unable to sleep at night. Despite sleeping, she does not feel rested.    MEDICATIONS:  She continues Adderall 3 times daily.           Health maintenance reviewed with the patient.  Health maintenance completed:  Health Maintenance Topics with due status: Not Due       Topic Last Completion Date    Cervical Cancer Screening 06/27/2023    Mammogram 06/13/2024    Hemoglobin A1c (Prediabetes) 12/11/2024    RSV Vaccine (Age 60+ and Pregnant patients) Not Due      Health maintenance due:  There are no preventive care reminders to display for this patient.   ROS:  Review of Systems   Complete ROS negative except as stated in HPI  History:     Past Medical History:   Diagnosis Date    ADHD (attention deficit hyperactivity disorder)     Migraine       Past Surgical History:   Procedure Laterality Date    CERVICAL CONIZATION   W/ LASER  2013    FESS, USING COMPUTER-ASSISTED NAVIGATION, WITH NASAL TURBINATE REDUCTION  2021    Dr. Stevens    FUNCTIONAL ENDOSCOPIC SINUS SURGERY (FESS)      MEDIPORT INSERTION, SINGLE Left 10/18/2024    US GUIDED BIOPSY BREAST RIGHT Right 07/01/2024    [facility] right breast lumpectomy with right axillary sentinel node biopsy Right 08/09/2024     Family History   Problem Relation Name Age of Onset    Ulcerative colitis Mother      Heart disease Father Anthony Chyna     Alcohol abuse Paternal Grandmother Briana chyna       Social History     Tobacco Use    Smoking status: Never     Passive exposure: Never    Smokeless tobacco: Never   Substance and  "Sexual Activity    Alcohol use: Not Currently    Drug use: Never    Sexual activity: Yes     Partners: Male     Birth control/protection: OCP          Allergies: Review of patient's allergies indicates:  No Known Allergies  Objective:     Vitals:    03/12/25 1036   BP: 108/78   Pulse: 83   Temp: 97.8 °F (36.6 °C)   TempSrc: Oral   Weight: 58.4 kg (128 lb 12.8 oz)   Height: 5' 2" (1.575 m)         Physical Examination:   Physical Exam  Vitals and nursing note reviewed.   Constitutional:       Appearance: Normal appearance.   HENT:      Head: Normocephalic and atraumatic.   Neurological:      General: No focal deficit present.      Mental Status: She is alert and oriented to person, place, and time.   Psychiatric:         Mood and Affect: Mood normal.         Behavior: Behavior normal.         Thought Content: Thought content normal.         Judgment: Judgment normal.           Medications:     Medication List with Changes/Refills   New Medications    PRAMIPEXOLE (MIRAPEX) 0.125 MG TABLET    Take 2 tablets (0.25 mg total) by mouth every evening.   Current Medications    ALPRAZOLAM (XANAX) 0.5 MG TABLET    TAKE ONE TABLET BY MOUTH ONCE A DAY * MAY TAKE TWO TABLETS NIGHTLY AS NEEDED FOR ANXIETY    AMITRIPTYLINE (ELAVIL) 10 MG TABLET        DEXAMETHASONE (DECADRON) 4 MG TAB    TAKE TWO (2) TABLETS (8 MG TOTAL) BY MOUTH ONCE DAILY ON DAYS TWO (2), THREE (3), AND FOUR (4) ONLY OF EACH CHEMOTHERAPY CYCLE (CYCLES 1-4).    DEXTROAMPHETAMINE-AMPHETAMINE (ADDERALL) 20 MG TABLET    Take 1 tablet by mouth 3 (three) times daily.    DOCUSATE SODIUM (COLACE) 100 MG CAPSULE    Take 100 mg by mouth 2 (two) times daily as needed for Constipation.    ENULOSE 10 GRAM/15 ML SOLUTION        LORATADINE (CLARITIN) 10 MG TABLET    Take 10 mg by mouth once daily.    NURTEC 75 MG ODT    PLACE 1 TABLET ON OR UNDER THE TONGUE ONCE DAILY AS NEEDED FOR MIGRAINE    OLANZAPINE (ZYPREXA) 5 MG TABLET    TAKE 1 TABLET (5 MG TOTAL) BY MOUTH EVERY EVENING " ON DAYS 1, TWO (2), THREE (3), AND FOUR (4) OF EACH CHEMOTHERAPY CYCLE (CYCLES 1-4)    ONDANSETRON (ZOFRAN-ODT) 8 MG TBDL    Take 1 tablet (8 mg total) by mouth every 6 (six) hours as needed.    PANTOPRAZOLE (PROTONIX) 40 MG TABLET    Take 1 tablet (40 mg total) by mouth once daily.    PROCHLORPERAZINE (COMPAZINE) 10 MG TABLET    TAKE 1 TABLET (10 MG TOTAL) BY MOUTH FOUR (4) (FOUR) TIMES DAILY AS NEEDED (NAUSEA).    UNABLE TO FIND    medication name: COLONMAX    VALACYCLOVIR (VALTREX) 1000 MG TABLET    Take by mouth.     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, predominantly inattentive type  F41.1 Anxiety state  F32.A Depression, unspecified depression type  G43.109 Migraine with aura and without status migrainosus, not intractable  G47.00 Insomnia, unspecified type  G25.81 RLS (restless legs syndrome)    IMPRESSION:  - Assessed management of migraines, anxiety, ADHD, depression, and cancer treatment  - Noted cessation of migraines since beginning chemotherapy  - Evaluated options for managing chemotherapy-induced hot flashes and restless legs  - Assessed weight gain, likely due to steroid use during chemotherapy  - Reviewed depression management and coping strategies  - Discussed upcoming transition from chemotherapy to potential radiation therapy    C50.911 INFILTRATING DUCTAL CARCINOMA OF RIGHT BREAST:  - Explained effects of steroids on weight gain and fluid retention during chemotherapy.  - Provided information on shorter duration and different side effects of radiation therapy compared to chemotherapy.  - Patient to discuss hot flash treatment options, including Veozah, with oncologist and report back.  - Contact office if oncologist approves Veozah for hot flash management.    F90.0 ADHD, PREDOMINANTLY INATTENTIVE TYPE:  - Continued Adderall 3 times daily at current  dose.    F41.1 ANXIETY STATE:  - Continued Xanax at current dose.    F32.A DEPRESSION, UNSPECIFIED DEPRESSION TYPE:  - Increased Amitriptyline from 10 mg to 20 mg at bedtime.  - Patient to try doubling current dose and report back.    G25.81 RLS (RESTLESS LEGS SYNDROME):  - Started Mirapex (Pramipexole) 0.125 mg at bedtime for restless legs.  - Take 1 tablet for about a week, then increase to 2 tablets if needed.  - Discussed importance of iron supplementation for managing restless leg syndrome.  - Recommend starting an iron supplement, such as a multivitamin with iron or tart cherry extract, to help with symptoms.          1. Attention deficit hyperactivity disorder (ADHD), predominantly inattentive type  Overview:  DX age 22   Adderall since diagnosis   Adderall 20 mg TID  Previously managed by Dena Rico, PhD psychology       2. Anxiety state  Overview:  02/06/2024 - start alprazolam 0.25 mg as needed at bedtime    04/11/2024 - alprazolam 0.5 mg at bedtime     July 2024 - diagnosed with breast cancer     08/05/2024 - alprazolam 0.5 mg b.i.d. p.r.n.    10/29/2024 - Xanax 0.5 mg daily as needed and 0.5 mg 2 tablets at bedtime as needed      3. Depression, unspecified depression type  Overview:  03/25/2024 - Viibryd 10 mg daily (never started)    Assessment & Plan:  - Increased Amitriptyline from 10 mg to 20 mg at bedtime.  - Patient to try doubling current dose and report back.      4. Migraine with aura and without status migrainosus, not intractable  Overview:  Diagnosed in her 20's by Dr Tabby Manrique not covered by insurance but worked  Imitrex did not help   On Zomig 5 mg as needed but has side effects  12/05/2023 - start Nurtec 75 mg ODT      5. Infiltrating ductal carcinoma of right breast  Overview:  Initially felt lump January 2024 - insurance changes with work, did not get imaging    06/13/2024 - baseline screening mammogram @ [facility] - breasts are extremely dense, category D, no evidence of  malignancy    06/26/2024 - bilateral breast US at [facility] - right breast mass at 11:00 a.m. 4 cm from the nipple, 2 x 1 x 1.8 x 2 x 2 cm, characteristics suspicious for malignancy    07/01/2024 - biopsy showing grade 3 invasive ductal carcinoma, receptors return ER/WA positive, HER2 negative    07/11/2024 - first consult surgical oncology Dr Amin     07/18/2024 - MRI breast @ [facility] - right breast round enhancing mass with microlobulated and spiculated margins, 2 x 8 x 2.4 x 2 x 3 cm.  No other enhancing masses or any other findings which are suspicious for malignancy    The patient then underwent a right breast lumpectomy with sentinel lymph node on 08/09/2024.  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  Superior margin reexcision was done and showed benign tissue.  There were 8 lymph nodes removed, 1 was positive for metastatic invasive ductal carcinoma.  Final pathologic stage was pT2, N1a, M0 stage II.     09/04/2024 - established care with Dr Barton    CT chest, abdomen, and pelvis done on 10/02/2024 showed no evidence of metastatic disease in the chest, abdomen, or pelvis.     Adjuvant chemotherapy with dose dense AC followed by weekly Taxol.  Started on 10/28/2024       6. Malignant neoplasm metastatic to lymph node of axilla  Overview:  Dr Barton  See problem list overview for infiltrating ductal carcinoma of right breast for details      7. Insomnia, unspecified type  Overview:  09/16/2024 - amitriptyline 10 mg 1-2 tablets at bedtime  09/18/2024 - discontinue amitriptyline due to headache and dry mouth, start trazodone 50 mg at bedtime  11/11/2024 - restarted amitriptyline at bedtime    Assessment & Plan:  - Increased Amitriptyline from 10 mg to 20 mg at bedtime.  - Patient to try doubling current dose and report back.      8. RLS (restless legs syndrome)  Overview:  03/12/2025 - start Mirapex 0.125 mg nightly for one-week then increase to 0.25 mg nightly, recommend OTC iron  supplementation    Assessment & Plan:  - Started Mirapex (Pramipexole) 0.125 mg at bedtime for restless legs.  - Take 1 tablet for about a week, then increase to 2 tablets if needed.  - Discussed importance of iron supplementation for managing restless leg syndrome.  - Recommend starting an iron supplement, such as a multivitamin with iron or tart cherry extract, to help with symptoms.     Orders:  -     pramipexole (MIRAPEX) 0.125 MG tablet; Take 2 tablets (0.25 mg total) by mouth every evening.  Dispense: 60 tablet; Refill: 11    9. Hot flashes  Assessment & Plan:  Strongly encouraged to talk to Oncology at her visit Friday about her hot flashes and potential treatment options.  Did discuss Veozah with the patient, if these hot flashes truly are due to hormonal/menopausal changes due to her current chemotherapy, Veozah would be a good option and we can get her set up to start it if okay with Oncology.                Follow Up:   Follow up in about 3 months (around 6/12/2025) for Virtual Visit.    I spent greater than 30 minutes today both in chart review and greater than 50% of that time in discussion with the patient regarding health maintenance, diagnoses, diagnostic tests, medications, treatments, symptom management, expected results and adverse effects. Patient verbalized understanding and all questions were answered.      This note was generated with the assistance of ambient listening technology. Verbal consent was obtained by the patient and accompanying visitor(s) for the recording of patient appointment to facilitate this note. I attest to having reviewed and edited the generated note for accuracy, though some syntax or spelling errors may persist. Please contact the author of this note for any clarification.

## 2025-03-12 NOTE — ASSESSMENT & PLAN NOTE
- Increased Amitriptyline from 10 mg to 20 mg at bedtime.  - Patient to try doubling current dose and report back.

## 2025-03-12 NOTE — ASSESSMENT & PLAN NOTE
Strongly encouraged to talk to Oncology at her visit Friday about her hot flashes and potential treatment options.  Did discuss Veozah with the patient, if these hot flashes truly are due to hormonal/menopausal changes due to her current chemotherapy, Veozah would be a good option and we can get her set up to start it if okay with Oncology.

## 2025-03-13 NOTE — PROGRESS NOTES
Subjective:       Patient ID: Joo Haywood is a 39 y.o. female.    Chief Complaint: 4 wk f/u (Hot flashes, not sleeping)      Diagnosis:  pT2, N1a, M0 stage II invasive ductal carcinoma, grade 3, ER 89.9% positive, IL 32.6% positive, HER2 Tyshawn negative by IHC (0) with a Ki-67 of 81.8%.    Current Treatment:   Adjuvant chemotherapy with dose dense AC followed by weekly Taxol.  Started on 10/28/2024.  Patient will need adjuvant radiation due to lymph node involvement and lumpectomy.  Patient will need adjuvant endocrine therapy, due to bonilla involvement would recommend ovarian suppression and aromatase inhibitor for 10 years    Treatment History:  Right-sided lumpectomy and sentinel lymph node on 08/09/2024    HPI:  39-year-old female who palpated a breast mass in January 2024.  After undergoing some insurance changes, she underwent  screening digital mammogram with tomosynthesis on 06/13/2024 at [facility].  This showed a subtle 2.0 cm round focal asymmetry in the right breast at the 12 o'clock position.  This corresponded to a lump that the patient had palpated.  This was BI-RADS 0, needs additional assessment.  Patient then underwent bilateral breast ultrasound on 06/26/2024, this revealed a mostly round hypoechoic mass with a microlobulated margin and heterogeneous texture located in the right breast at the 11 o'clock position 4 cm from the nipple measuring 2.1 x 1.8 x 2.2 cm.  This was suspicious for malignancy.  There were no other masses or other findings that were suspicious.  The right axilla showed nonspecific benign-appearing lymph nodes without evidence of malignancy.  This was read as BI-RADS 4, biopsy recommended.  Patient underwent a biopsy on 07/01/2024, pathology revealed invasive ductal carcinoma, grade 3.  Breast panel showed ER 89.9% positive, IL 32.6% positive, HER2 negative by IHC (0) with a Ki-67 of 81.8%.  The patient was then seen by Dr. Candelario Amin on 07/11/2024, plan  was for right-sided lumpectomy and sentinel lymph node.  She had a breast MRI on 07/18/2024 that showed a 2.8 x 2.4 x 2.3 cm round enhancing mass with microlobulated and spiculated margins located in the right breast at the 12 o'clock position 4 cm from the nipple, this was consistent with patient's biopsy-proven invasive ductal carcinoma.  There were no lymph nodes in the right axilla or in the internal mammary chain that were suspicious.  The patient then underwent a right breast lumpectomy with sentinel lymph node on 08/09/2024.  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  Superior margin reexcision was done and showed benign tissue.  There were 8 lymph nodes removed, 1 was positive for metastatic invasive ductal carcinoma.  Final pathologic stage was pT2, N1a, M0 stage II.  I initially saw the patient on 09/04/2024. Oncotype DX returned with a score of 44, greater than 12% chance of distant recurrence at 9 years with an AI or tamoxifen alone, and chemotherapy benefit.     CT chest, abdomen, and pelvis done on 10/02/2024 showed no evidence of metastatic disease in the chest, abdomen, or pelvis.    Echocardiogram done on 10/10/2024 showed an EF of 60-65%.    Adjuvant chemotherapy with dose dense AC started on 10/28/2024.     Interval History:   Patient presents to clinic for scheduled follow up appointment and treatment visit for her breast cancer.  She is currently being treated with weekly taxol and is scheduled for her second to last tx.  She is tolerating treatment well.  She does have hot flashes and insomnia. She is taking sleep medications but not very effective. Constipation remains manageable.  She denies any pain, nausea, shortness of breath, neuropathy.    Past Medical History:   Diagnosis Date    ADHD (attention deficit hyperactivity disorder)     Migraine       Past Surgical History:   Procedure Laterality Date    CERVICAL CONIZATION   W/ LASER  2013    FESS, USING  COMPUTER-ASSISTED NAVIGATION, WITH NASAL TURBINATE REDUCTION  2021    Dr. Stevens    FUNCTIONAL ENDOSCOPIC SINUS SURGERY (FESS)      MEDIPORT INSERTION, SINGLE Left 10/18/2024    US GUIDED BIOPSY BREAST RIGHT Right 07/01/2024    [facility] right breast lumpectomy with right axillary sentinel node biopsy Right 08/09/2024     Social History     Socioeconomic History    Marital status: Significant Other   Tobacco Use    Smoking status: Never     Passive exposure: Never    Smokeless tobacco: Never   Substance and Sexual Activity    Alcohol use: Not Currently    Drug use: Never    Sexual activity: Yes     Partners: Male     Birth control/protection: OCP     Social Drivers of Health     Financial Resource Strain: Low Risk  (12/9/2024)    Overall Financial Resource Strain (CARDIA)     Difficulty of Paying Living Expenses: Not very hard   Food Insecurity: No Food Insecurity (12/9/2024)    Hunger Vital Sign     Worried About Running Out of Food in the Last Year: Never true     Ran Out of Food in the Last Year: Never true   Transportation Needs: No Transportation Needs (2/6/2024)    PRAPARE - Transportation     Lack of Transportation (Medical): No     Lack of Transportation (Non-Medical): No   Physical Activity: Inactive (12/9/2024)    Exercise Vital Sign     Days of Exercise per Week: 0 days     Minutes of Exercise per Session: 0 min   Stress: Stress Concern Present (12/9/2024)    Citizen of [place] of Occupational Health - Occupational Stress Questionnaire     Feeling of Stress : To some extent   Housing Stability: Low Risk  (2/6/2024)    Housing Stability Vital Sign     Unable to Pay for Housing in the Last Year: No     Number of Places Lived in the Last Year: 1     Unstable Housing in the Last Year: No      Family History   Problem Relation Name Age of Onset    Ulcerative colitis Mother      Heart disease Father Anthony Chyna     Alcohol abuse Paternal Grandmother Briana chyna       Review of patient's allergies indicates:  No  Known Allergies   Review of Systems   Constitutional:  Negative for appetite change and unexpected weight change.   HENT:  Negative for mouth sores.    Respiratory:  Negative for cough and shortness of breath.    Cardiovascular:  Negative for chest pain.   Gastrointestinal:  Negative for abdominal pain and diarrhea.   Genitourinary:  Negative for frequency.   Integumentary:  Negative for rash.   Hematological:  Negative for adenopathy.   Psychiatric/Behavioral:  The patient is nervous/anxious.          Objective:      Physical Exam  Vitals reviewed.   Constitutional:       General: She is not in acute distress.     Appearance: Normal appearance.   HENT:      Head: Normocephalic and atraumatic.      Nose: Nose normal.      Mouth/Throat:      Mouth: Mucous membranes are moist.   Eyes:      Extraocular Movements: Extraocular movements intact.      Conjunctiva/sclera: Conjunctivae normal.   Cardiovascular:      Rate and Rhythm: Normal rate and regular rhythm.      Pulses: Normal pulses.      Heart sounds: Normal heart sounds.   Pulmonary:      Effort: Pulmonary effort is normal.      Breath sounds: Normal breath sounds.   Musculoskeletal:         General: No swelling. Normal range of motion.      Cervical back: Normal range of motion and neck supple.      Right lower leg: No edema.      Left lower leg: No edema.   Skin:     General: Skin is warm and dry.   Neurological:      General: No focal deficit present.      Mental Status: She is alert and oriented to person, place, and time. Mental status is at baseline.   Psychiatric:         Mood and Affect: Mood normal.         Behavior: Behavior normal.         LABS AND IMAGING REVIEWED IN EPIC          Assessment:   pT2, N1a, M0 stage II invasive ductal carcinoma, grade 3, ER 89.9% positive, AK 32.6% positive, HER2 Tyshawn negative by IHC (0) with a Ki-67 of 81.8%.        Plan:       We had a long conversation about the overall good prognosis.  We discussed Oncotype DX results.   Goal of treatment is cure.    I recommended dose dense AC followed by weekly Taxol.    She will then need adjuvant radiation.    I explained that due to her hormone receptor positivity, she would need adjuvant endocrine therapy. I would recommend 10 years of therapy due to bonilla involvement. I also discussed the possibility of ovarian suppression + aromatase inhibitor. She did inquire about having a tubal ligation and ablation, but now her plan is to have a bilateral oophorectomy in July 2025.    Discussed fertility, patient is done with child bearing.    Radiation oncology,  Prellop    CT chest, abdomen, and pelvis done on 10/02/2024 showed no evidence of disease.    Echocardiogram done on 10/10/2024 showed an EF of 60-65%.    Started AC in a dose dense fashion every 2 weeks on 10/28/2024.  She has completed her 4 doses and is now receiving weekly Taxol.      Anemia mild and stable, continue to monitor.    Lactulose for her to use as needed for constipation.    She can use omeprazole, Pepcid, Tums for heartburn.    Okay to proceed with cycle 15 next week  We will give MVI tomorrow    Labs and Taxol weekly    She will return to clinic in 2-3 weeks with labs and Dr. Barton to discuss future tx and adjuvant aromatase inhibitor    Visit today included increased complexity associated with the care of the episodic problem breast cancer on chemotherapy, addressing and managing the longitudinal care of the patient's breast cancer.

## 2025-03-14 ENCOUNTER — OFFICE VISIT (OUTPATIENT)
Dept: HEMATOLOGY/ONCOLOGY | Facility: CLINIC | Age: 40
End: 2025-03-14
Payer: COMMERCIAL

## 2025-03-14 ENCOUNTER — LAB VISIT (OUTPATIENT)
Dept: LAB | Facility: HOSPITAL | Age: 40
End: 2025-03-14
Attending: INTERNAL MEDICINE
Payer: COMMERCIAL

## 2025-03-14 VITALS
SYSTOLIC BLOOD PRESSURE: 119 MMHG | BODY MASS INDEX: 23.46 KG/M2 | RESPIRATION RATE: 18 BRPM | TEMPERATURE: 98 F | HEIGHT: 62 IN | DIASTOLIC BLOOD PRESSURE: 78 MMHG | WEIGHT: 127.5 LBS | HEART RATE: 88 BPM | OXYGEN SATURATION: 99 %

## 2025-03-14 DIAGNOSIS — C50.911 INFILTRATING DUCTAL CARCINOMA OF RIGHT BREAST: ICD-10-CM

## 2025-03-14 DIAGNOSIS — D84.821 IMMUNOCOMPROMISED STATE DUE TO DRUG THERAPY: ICD-10-CM

## 2025-03-14 DIAGNOSIS — D64.81 ANEMIA DUE TO ANTINEOPLASTIC CHEMOTHERAPY: ICD-10-CM

## 2025-03-14 DIAGNOSIS — C50.911 INFILTRATING DUCTAL CARCINOMA OF RIGHT BREAST: Primary | ICD-10-CM

## 2025-03-14 DIAGNOSIS — Z79.899 IMMUNOCOMPROMISED STATE DUE TO DRUG THERAPY: ICD-10-CM

## 2025-03-14 DIAGNOSIS — C77.3 MALIGNANT NEOPLASM METASTATIC TO LYMPH NODE OF AXILLA: ICD-10-CM

## 2025-03-14 DIAGNOSIS — T45.1X5A ANEMIA DUE TO ANTINEOPLASTIC CHEMOTHERAPY: ICD-10-CM

## 2025-03-14 DIAGNOSIS — R53.1 WEAKNESS: ICD-10-CM

## 2025-03-14 LAB
ALBUMIN SERPL-MCNC: 3.7 G/DL (ref 3.5–5)
ALBUMIN/GLOB SERPL: 1.4 RATIO (ref 1.1–2)
ALP SERPL-CCNC: 53 UNIT/L (ref 40–150)
ALT SERPL-CCNC: 22 UNIT/L (ref 0–55)
ANION GAP SERPL CALC-SCNC: 8 MEQ/L
AST SERPL-CCNC: 35 UNIT/L (ref 5–34)
BASOPHILS # BLD AUTO: 0.01 X10(3)/MCL
BASOPHILS NFR BLD AUTO: 0.3 %
BILIRUB SERPL-MCNC: 0.4 MG/DL
BUN SERPL-MCNC: 11.4 MG/DL (ref 7–18.7)
CALCIUM SERPL-MCNC: 9.6 MG/DL (ref 8.4–10.2)
CHLORIDE SERPL-SCNC: 106 MMOL/L (ref 98–107)
CO2 SERPL-SCNC: 25 MMOL/L (ref 22–29)
CREAT SERPL-MCNC: 0.75 MG/DL (ref 0.55–1.02)
CREAT/UREA NIT SERPL: 15
EOSINOPHIL # BLD AUTO: 0.08 X10(3)/MCL (ref 0–0.9)
EOSINOPHIL NFR BLD AUTO: 2.4 %
ERYTHROCYTE [DISTWIDTH] IN BLOOD BY AUTOMATED COUNT: 13.3 % (ref 11.5–17)
GFR SERPLBLD CREATININE-BSD FMLA CKD-EPI: >60 ML/MIN/1.73/M2
GLOBULIN SER-MCNC: 2.7 GM/DL (ref 2.4–3.5)
GLUCOSE SERPL-MCNC: 93 MG/DL (ref 74–100)
HCT VFR BLD AUTO: 34.3 % (ref 37–47)
HGB BLD-MCNC: 11.6 G/DL (ref 12–16)
IMM GRANULOCYTES # BLD AUTO: 0 X10(3)/MCL (ref 0–0.04)
IMM GRANULOCYTES NFR BLD AUTO: 0 %
LYMPHOCYTES # BLD AUTO: 0.59 X10(3)/MCL (ref 0.6–4.6)
LYMPHOCYTES NFR BLD AUTO: 17.9 %
MAGNESIUM SERPL-MCNC: 2 MG/DL (ref 1.6–2.6)
MCH RBC QN AUTO: 32.2 PG (ref 27–31)
MCHC RBC AUTO-ENTMCNC: 33.8 G/DL (ref 33–36)
MCV RBC AUTO: 95.3 FL (ref 80–94)
MONOCYTES # BLD AUTO: 0.14 X10(3)/MCL (ref 0.1–1.3)
MONOCYTES NFR BLD AUTO: 4.2 %
NEUTROPHILS # BLD AUTO: 2.48 X10(3)/MCL (ref 2.1–9.2)
NEUTROPHILS NFR BLD AUTO: 75.2 %
PLATELET # BLD AUTO: 289 X10(3)/MCL (ref 130–400)
PMV BLD AUTO: 8.5 FL (ref 7.4–10.4)
POTASSIUM SERPL-SCNC: 4.6 MMOL/L (ref 3.5–5.1)
PROT SERPL-MCNC: 6.4 GM/DL (ref 6.4–8.3)
RBC # BLD AUTO: 3.6 X10(6)/MCL (ref 4.2–5.4)
SODIUM SERPL-SCNC: 139 MMOL/L (ref 136–145)
WBC # BLD AUTO: 3.3 X10(3)/MCL (ref 4.5–11.5)

## 2025-03-14 PROCEDURE — 80053 COMPREHEN METABOLIC PANEL: CPT

## 2025-03-14 PROCEDURE — 36415 COLL VENOUS BLD VENIPUNCTURE: CPT

## 2025-03-14 PROCEDURE — 99999 PR PBB SHADOW E&M-EST. PATIENT-LVL IV: CPT | Mod: PBBFAC,,, | Performed by: NURSE PRACTITIONER

## 2025-03-14 PROCEDURE — 83735 ASSAY OF MAGNESIUM: CPT

## 2025-03-14 PROCEDURE — 85025 COMPLETE CBC W/AUTO DIFF WBC: CPT

## 2025-03-17 ENCOUNTER — INFUSION (OUTPATIENT)
Dept: INFUSION THERAPY | Facility: HOSPITAL | Age: 40
End: 2025-03-17
Attending: INTERNAL MEDICINE
Payer: COMMERCIAL

## 2025-03-17 VITALS
SYSTOLIC BLOOD PRESSURE: 136 MMHG | DIASTOLIC BLOOD PRESSURE: 90 MMHG | HEIGHT: 62 IN | BODY MASS INDEX: 23.46 KG/M2 | HEART RATE: 85 BPM | WEIGHT: 127.5 LBS | RESPIRATION RATE: 20 BRPM

## 2025-03-17 DIAGNOSIS — C50.911 INFILTRATING DUCTAL CARCINOMA OF RIGHT BREAST: ICD-10-CM

## 2025-03-17 DIAGNOSIS — C77.3 MALIGNANT NEOPLASM METASTATIC TO LYMPH NODE OF AXILLA: Primary | ICD-10-CM

## 2025-03-17 LAB
B-HCG UR QL: NEGATIVE
CTP QC/QA: YES

## 2025-03-17 PROCEDURE — 96413 CHEMO IV INFUSION 1 HR: CPT

## 2025-03-17 PROCEDURE — 96367 TX/PROPH/DG ADDL SEQ IV INF: CPT

## 2025-03-17 PROCEDURE — 96375 TX/PRO/DX INJ NEW DRUG ADDON: CPT

## 2025-03-17 PROCEDURE — 63600175 PHARM REV CODE 636 W HCPCS: Performed by: NURSE PRACTITIONER

## 2025-03-17 PROCEDURE — 81025 URINE PREGNANCY TEST: CPT | Performed by: INTERNAL MEDICINE

## 2025-03-17 PROCEDURE — 25000003 PHARM REV CODE 250: Performed by: NURSE PRACTITIONER

## 2025-03-17 RX ORDER — HEPARIN 100 UNIT/ML
500 SYRINGE INTRAVENOUS
Status: CANCELLED | OUTPATIENT
Start: 2025-03-17

## 2025-03-17 RX ORDER — DEXAMETHASONE SODIUM PHOSPHATE 10 MG/ML
10 INJECTION INTRAMUSCULAR; INTRAVENOUS
Status: CANCELLED | OUTPATIENT
Start: 2025-03-17

## 2025-03-17 RX ORDER — FAMOTIDINE 10 MG/ML
20 INJECTION, SOLUTION INTRAVENOUS
Status: CANCELLED | OUTPATIENT
Start: 2025-03-17

## 2025-03-17 RX ORDER — DIPHENHYDRAMINE HYDROCHLORIDE 50 MG/ML
50 INJECTION, SOLUTION INTRAMUSCULAR; INTRAVENOUS ONCE AS NEEDED
Status: DISCONTINUED | OUTPATIENT
Start: 2025-03-17 | End: 2025-03-17 | Stop reason: HOSPADM

## 2025-03-17 RX ORDER — EPINEPHRINE 0.3 MG/.3ML
0.3 INJECTION SUBCUTANEOUS ONCE AS NEEDED
Status: CANCELLED | OUTPATIENT
Start: 2025-03-17

## 2025-03-17 RX ORDER — EPINEPHRINE 0.3 MG/.3ML
0.3 INJECTION SUBCUTANEOUS ONCE AS NEEDED
Status: DISCONTINUED | OUTPATIENT
Start: 2025-03-17 | End: 2025-03-17 | Stop reason: HOSPADM

## 2025-03-17 RX ORDER — SODIUM CHLORIDE 0.9 % (FLUSH) 0.9 %
10 SYRINGE (ML) INJECTION
Status: CANCELLED | OUTPATIENT
Start: 2025-03-17

## 2025-03-17 RX ORDER — DEXAMETHASONE SODIUM PHOSPHATE 10 MG/ML
10 INJECTION, SOLUTION INTRA-ARTICULAR; INTRALESIONAL; INTRAMUSCULAR; INTRAVENOUS; SOFT TISSUE
Status: COMPLETED | OUTPATIENT
Start: 2025-03-17 | End: 2025-03-17

## 2025-03-17 RX ORDER — FAMOTIDINE 10 MG/ML
20 INJECTION, SOLUTION INTRAVENOUS
Status: COMPLETED | OUTPATIENT
Start: 2025-03-17 | End: 2025-03-17

## 2025-03-17 RX ORDER — HEPARIN 100 UNIT/ML
500 SYRINGE INTRAVENOUS
Status: DISCONTINUED | OUTPATIENT
Start: 2025-03-17 | End: 2025-03-17 | Stop reason: HOSPADM

## 2025-03-17 RX ORDER — DIPHENHYDRAMINE HYDROCHLORIDE 50 MG/ML
50 INJECTION, SOLUTION INTRAMUSCULAR; INTRAVENOUS ONCE AS NEEDED
Status: CANCELLED | OUTPATIENT
Start: 2025-03-17

## 2025-03-17 RX ORDER — SODIUM CHLORIDE 0.9 % (FLUSH) 0.9 %
10 SYRINGE (ML) INJECTION
Status: DISCONTINUED | OUTPATIENT
Start: 2025-03-17 | End: 2025-03-17 | Stop reason: HOSPADM

## 2025-03-17 RX ADMIN — FAMOTIDINE 20 MG: 10 INJECTION, SOLUTION INTRAVENOUS at 02:03

## 2025-03-17 RX ADMIN — DEXAMETHASONE SODIUM PHOSPHATE 10 MG: 10 INJECTION, SOLUTION INTRAMUSCULAR; INTRAVENOUS at 02:03

## 2025-03-17 RX ADMIN — PACLITAXEL 120 MG: 6 INJECTION, SOLUTION INTRAVENOUS at 03:03

## 2025-03-17 RX ADMIN — HEPARIN 500 UNITS: 100 SYRINGE at 04:03

## 2025-03-17 RX ADMIN — SODIUM CHLORIDE 50 MG: 9 INJECTION, SOLUTION INTRAVENOUS at 02:03

## 2025-03-20 DIAGNOSIS — C50.911 INFILTRATING DUCTAL CARCINOMA OF RIGHT BREAST: Primary | ICD-10-CM

## 2025-03-20 DIAGNOSIS — C77.3 MALIGNANT NEOPLASM METASTATIC TO LYMPH NODE OF AXILLA: ICD-10-CM

## 2025-03-21 ENCOUNTER — LAB VISIT (OUTPATIENT)
Dept: LAB | Facility: HOSPITAL | Age: 40
End: 2025-03-21
Attending: INTERNAL MEDICINE
Payer: COMMERCIAL

## 2025-03-21 DIAGNOSIS — C77.3 MALIGNANT NEOPLASM METASTATIC TO LYMPH NODE OF AXILLA: ICD-10-CM

## 2025-03-21 DIAGNOSIS — C50.911 INFILTRATING DUCTAL CARCINOMA OF RIGHT BREAST: ICD-10-CM

## 2025-03-21 LAB
ALBUMIN SERPL-MCNC: 3.5 G/DL (ref 3.5–5)
ALBUMIN/GLOB SERPL: 1.3 RATIO (ref 1.1–2)
ALP SERPL-CCNC: 50 UNIT/L (ref 40–150)
ALT SERPL-CCNC: 18 UNIT/L (ref 0–55)
ANION GAP SERPL CALC-SCNC: 8 MEQ/L
AST SERPL-CCNC: 31 UNIT/L (ref 11–45)
B-HCG UR QL: NEGATIVE
BASOPHILS # BLD AUTO: 0.02 X10(3)/MCL
BASOPHILS NFR BLD AUTO: 0.6 %
BILIRUB SERPL-MCNC: 0.4 MG/DL
BUN SERPL-MCNC: 11 MG/DL (ref 7–18.7)
CALCIUM SERPL-MCNC: 9 MG/DL (ref 8.4–10.2)
CHLORIDE SERPL-SCNC: 107 MMOL/L (ref 98–107)
CO2 SERPL-SCNC: 24 MMOL/L (ref 22–29)
CREAT SERPL-MCNC: 0.72 MG/DL (ref 0.55–1.02)
CREAT/UREA NIT SERPL: 15
EOSINOPHIL # BLD AUTO: 0.09 X10(3)/MCL (ref 0–0.9)
EOSINOPHIL NFR BLD AUTO: 2.8 %
ERYTHROCYTE [DISTWIDTH] IN BLOOD BY AUTOMATED COUNT: 13.3 % (ref 11.5–17)
GFR SERPLBLD CREATININE-BSD FMLA CKD-EPI: >60 ML/MIN/1.73/M2
GLOBULIN SER-MCNC: 2.8 GM/DL (ref 2.4–3.5)
GLUCOSE SERPL-MCNC: 94 MG/DL (ref 74–100)
HCT VFR BLD AUTO: 33.3 % (ref 37–47)
HGB BLD-MCNC: 11.3 G/DL (ref 12–16)
IMM GRANULOCYTES # BLD AUTO: 0 X10(3)/MCL (ref 0–0.04)
IMM GRANULOCYTES NFR BLD AUTO: 0 %
LYMPHOCYTES # BLD AUTO: 0.58 X10(3)/MCL (ref 0.6–4.6)
LYMPHOCYTES NFR BLD AUTO: 17.7 %
MAGNESIUM SERPL-MCNC: 1.9 MG/DL (ref 1.6–2.6)
MCH RBC QN AUTO: 32.4 PG (ref 27–31)
MCHC RBC AUTO-ENTMCNC: 33.9 G/DL (ref 33–36)
MCV RBC AUTO: 95.4 FL (ref 80–94)
MONOCYTES # BLD AUTO: 0.12 X10(3)/MCL (ref 0.1–1.3)
MONOCYTES NFR BLD AUTO: 3.7 %
NEUTROPHILS # BLD AUTO: 2.46 X10(3)/MCL (ref 2.1–9.2)
NEUTROPHILS NFR BLD AUTO: 75.2 %
PLATELET # BLD AUTO: 274 X10(3)/MCL (ref 130–400)
PMV BLD AUTO: 8.8 FL (ref 7.4–10.4)
POTASSIUM SERPL-SCNC: 4.2 MMOL/L (ref 3.5–5.1)
PROT SERPL-MCNC: 6.3 GM/DL (ref 6.4–8.3)
RBC # BLD AUTO: 3.49 X10(6)/MCL (ref 4.2–5.4)
SODIUM SERPL-SCNC: 139 MMOL/L (ref 136–145)
WBC # BLD AUTO: 3.27 X10(3)/MCL (ref 4.5–11.5)

## 2025-03-21 PROCEDURE — 83735 ASSAY OF MAGNESIUM: CPT

## 2025-03-21 PROCEDURE — 80053 COMPREHEN METABOLIC PANEL: CPT

## 2025-03-21 PROCEDURE — 81025 URINE PREGNANCY TEST: CPT

## 2025-03-21 PROCEDURE — 36415 COLL VENOUS BLD VENIPUNCTURE: CPT

## 2025-03-21 PROCEDURE — 85025 COMPLETE CBC W/AUTO DIFF WBC: CPT

## 2025-03-24 ENCOUNTER — PATIENT MESSAGE (OUTPATIENT)
Dept: HEMATOLOGY/ONCOLOGY | Facility: CLINIC | Age: 40
End: 2025-03-24
Payer: COMMERCIAL

## 2025-03-24 ENCOUNTER — INFUSION (OUTPATIENT)
Dept: INFUSION THERAPY | Facility: HOSPITAL | Age: 40
End: 2025-03-24
Attending: INTERNAL MEDICINE
Payer: COMMERCIAL

## 2025-03-24 VITALS — HEART RATE: 87 BPM | DIASTOLIC BLOOD PRESSURE: 83 MMHG | SYSTOLIC BLOOD PRESSURE: 121 MMHG

## 2025-03-24 DIAGNOSIS — F90.0 ATTENTION DEFICIT HYPERACTIVITY DISORDER (ADHD), PREDOMINANTLY INATTENTIVE TYPE: ICD-10-CM

## 2025-03-24 DIAGNOSIS — C77.3 MALIGNANT NEOPLASM METASTATIC TO LYMPH NODE OF AXILLA: Primary | ICD-10-CM

## 2025-03-24 DIAGNOSIS — C50.911 INFILTRATING DUCTAL CARCINOMA OF RIGHT BREAST: ICD-10-CM

## 2025-03-24 PROCEDURE — 63600175 PHARM REV CODE 636 W HCPCS: Performed by: NURSE PRACTITIONER

## 2025-03-24 PROCEDURE — 96375 TX/PRO/DX INJ NEW DRUG ADDON: CPT

## 2025-03-24 PROCEDURE — 96367 TX/PROPH/DG ADDL SEQ IV INF: CPT

## 2025-03-24 PROCEDURE — 96413 CHEMO IV INFUSION 1 HR: CPT

## 2025-03-24 PROCEDURE — 25000003 PHARM REV CODE 250: Performed by: NURSE PRACTITIONER

## 2025-03-24 RX ORDER — DIPHENHYDRAMINE HYDROCHLORIDE 50 MG/ML
50 INJECTION, SOLUTION INTRAMUSCULAR; INTRAVENOUS ONCE AS NEEDED
Status: DISCONTINUED | OUTPATIENT
Start: 2025-03-24 | End: 2025-03-24 | Stop reason: HOSPADM

## 2025-03-24 RX ORDER — HEPARIN 100 UNIT/ML
500 SYRINGE INTRAVENOUS
Status: CANCELLED | OUTPATIENT
Start: 2025-03-24

## 2025-03-24 RX ORDER — FAMOTIDINE 10 MG/ML
20 INJECTION, SOLUTION INTRAVENOUS
Status: CANCELLED | OUTPATIENT
Start: 2025-03-24

## 2025-03-24 RX ORDER — EPINEPHRINE 0.3 MG/.3ML
0.3 INJECTION SUBCUTANEOUS ONCE AS NEEDED
Status: CANCELLED | OUTPATIENT
Start: 2025-03-24

## 2025-03-24 RX ORDER — DEXAMETHASONE SODIUM PHOSPHATE 10 MG/ML
10 INJECTION, SOLUTION INTRA-ARTICULAR; INTRALESIONAL; INTRAMUSCULAR; INTRAVENOUS; SOFT TISSUE
Status: COMPLETED | OUTPATIENT
Start: 2025-03-24 | End: 2025-03-24

## 2025-03-24 RX ORDER — SODIUM CHLORIDE 0.9 % (FLUSH) 0.9 %
10 SYRINGE (ML) INJECTION
Status: DISCONTINUED | OUTPATIENT
Start: 2025-03-24 | End: 2025-03-24 | Stop reason: HOSPADM

## 2025-03-24 RX ORDER — HEPARIN 100 UNIT/ML
500 SYRINGE INTRAVENOUS
Status: DISCONTINUED | OUTPATIENT
Start: 2025-03-24 | End: 2025-03-24 | Stop reason: HOSPADM

## 2025-03-24 RX ORDER — DIPHENHYDRAMINE HYDROCHLORIDE 50 MG/ML
50 INJECTION, SOLUTION INTRAMUSCULAR; INTRAVENOUS ONCE AS NEEDED
Status: CANCELLED | OUTPATIENT
Start: 2025-03-24

## 2025-03-24 RX ORDER — EPINEPHRINE 0.3 MG/.3ML
0.3 INJECTION SUBCUTANEOUS ONCE AS NEEDED
Status: DISCONTINUED | OUTPATIENT
Start: 2025-03-24 | End: 2025-03-24 | Stop reason: HOSPADM

## 2025-03-24 RX ORDER — DEXAMETHASONE SODIUM PHOSPHATE 10 MG/ML
10 INJECTION INTRAMUSCULAR; INTRAVENOUS
Status: CANCELLED | OUTPATIENT
Start: 2025-03-24

## 2025-03-24 RX ORDER — FAMOTIDINE 10 MG/ML
20 INJECTION, SOLUTION INTRAVENOUS
Status: COMPLETED | OUTPATIENT
Start: 2025-03-24 | End: 2025-03-24

## 2025-03-24 RX ORDER — SODIUM CHLORIDE 0.9 % (FLUSH) 0.9 %
10 SYRINGE (ML) INJECTION
Status: CANCELLED | OUTPATIENT
Start: 2025-03-24

## 2025-03-24 RX ORDER — DEXTROAMPHETAMINE SACCHARATE, AMPHETAMINE ASPARTATE, DEXTROAMPHETAMINE SULFATE AND AMPHETAMINE SULFATE 5; 5; 5; 5 MG/1; MG/1; MG/1; MG/1
1 TABLET ORAL 3 TIMES DAILY
Qty: 90 TABLET | Refills: 0 | Status: SHIPPED | OUTPATIENT
Start: 2025-03-24

## 2025-03-24 RX ADMIN — SODIUM CHLORIDE 50 MG: 9 INJECTION, SOLUTION INTRAVENOUS at 02:03

## 2025-03-24 RX ADMIN — DEXAMETHASONE SODIUM PHOSPHATE 10 MG: 10 INJECTION, SOLUTION INTRAMUSCULAR; INTRAVENOUS at 02:03

## 2025-03-24 RX ADMIN — PACLITAXEL 120 MG: 6 INJECTION, SOLUTION INTRAVENOUS at 03:03

## 2025-03-24 RX ADMIN — FAMOTIDINE 20 MG: 10 INJECTION, SOLUTION INTRAVENOUS at 02:03

## 2025-03-26 NOTE — PROGRESS NOTES
Subjective:       Patient ID: Joo Haywood is a 40 y.o. female.    Chief Complaint: Follow-up (Patient reports swelling on right arm)      Diagnosis:  pT2, N1a, M0 stage II invasive ductal carcinoma, grade 3, ER 89.9% positive, OR 32.6% positive, HER2 Tyshawn negative by IHC (0) with a Ki-67 of 81.8%.    Current Treatment:   Patient will need adjuvant radiation due to lymph node involvement and lumpectomy.  Patient will need adjuvant endocrine therapy, due to bonilla involvement would recommend ovarian suppression and aromatase inhibitor for 10 years    Treatment History:  Right-sided lumpectomy and sentinel lymph node on 08/09/2024.  Adjuvant chemotherapy in a dose dense fashion every 2 weeks on 10/28/2024.  She has completed her 4 doses and is now receiving weekly Taxol. Completed 12 cycles of weekly taxol on 03/24/2025 .     HPI:  40-year-old female who palpated a breast mass in January 2024.  After undergoing some insurance changes, she underwent  screening digital mammogram with tomosynthesis on 06/13/2024 at breast Center [facility].  This showed a subtle 2.0 cm round focal asymmetry in the right breast at the 12 o'clock position.  This corresponded to a lump that the patient had palpated.  This was BI-RADS 0, needs additional assessment.  Patient then underwent bilateral breast ultrasound on 06/26/2024, this revealed a mostly round hypoechoic mass with a microlobulated margin and heterogeneous texture located in the right breast at the 11 o'clock position 4 cm from the nipple measuring 2.1 x 1.8 x 2.2 cm.  This was suspicious for malignancy.  There were no other masses or other findings that were suspicious.  The right axilla showed nonspecific benign-appearing lymph nodes without evidence of malignancy.  This was read as BI-RADS 4, biopsy recommended.  Patient underwent a biopsy on 07/01/2024, pathology revealed invasive ductal carcinoma, grade 3.  Breast panel showed ER 89.9% positive, OR 32.6%  positive, HER2 negative by IHC (0) with a Ki-67 of 81.8%.  The patient was then seen by Dr. Candelario Amin on 07/11/2024, plan was for right-sided lumpectomy and sentinel lymph node.  She had a breast MRI on 07/18/2024 that showed a 2.8 x 2.4 x 2.3 cm round enhancing mass with microlobulated and spiculated margins located in the right breast at the 12 o'clock position 4 cm from the nipple, this was consistent with patient's biopsy-proven invasive ductal carcinoma.  There were no lymph nodes in the right axilla or in the internal mammary chain that were suspicious.  The patient then underwent a right breast lumpectomy with sentinel lymph node on 08/09/2024.  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  Superior margin reexcision was done and showed benign tissue.  There were 8 lymph nodes removed, 1 was positive for metastatic invasive ductal carcinoma.  Final pathologic stage was pT2, N1a, M0 stage II.  I initially saw the patient on 09/04/2024. Oncotype DX returned with a score of 44, greater than 12% chance of distant recurrence at 9 years with an AI or tamoxifen alone, and chemotherapy benefit.     CT chest, abdomen, and pelvis done on 10/02/2024 showed no evidence of metastatic disease in the chest, abdomen, or pelvis.    Echocardiogram done on 10/10/2024 showed an EF of 60-65%.    Adjuvant chemotherapy in a dose dense fashion every 2 weeks on 10/28/2024.  She has completed her 4 doses and is now receiving weekly Taxol. Completed 12 cycles of weekly taxol on 03/24/2025 .    Interval History:   Patient presents to clinic for scheduled follow up appointment.  She completed her chemotherapy on 03/24/2025.  Overall she is doing well.  She did experience hot flashes.    Past Medical History:   Diagnosis Date    ADHD (attention deficit hyperactivity disorder)     Migraine       Past Surgical History:   Procedure Laterality Date    CERVICAL CONIZATION   W/ LASER  2013    FESS, USING COMPUTER-ASSISTED  NAVIGATION, WITH NASAL TURBINATE REDUCTION  2021    Dr. Stevens    FUNCTIONAL ENDOSCOPIC SINUS SURGERY (FESS)      MEDIPORT INSERTION, SINGLE Left 10/18/2024    US GUIDED BIOPSY BREAST RIGHT Right 07/01/2024    USG right breast lumpectomy with right axillary sentinel node biopsy Right 08/09/2024     Social History     Socioeconomic History    Marital status: Significant Other   Tobacco Use    Smoking status: Never     Passive exposure: Never    Smokeless tobacco: Never   Substance and Sexual Activity    Alcohol use: Not Currently    Drug use: Never    Sexual activity: Yes     Partners: Male     Birth control/protection: OCP     Social Drivers of Health     Financial Resource Strain: Low Risk  (12/9/2024)    Overall Financial Resource Strain (CARDIA)     Difficulty of Paying Living Expenses: Not very hard   Food Insecurity: No Food Insecurity (12/9/2024)    Hunger Vital Sign     Worried About Running Out of Food in the Last Year: Never true     Ran Out of Food in the Last Year: Never true   Transportation Needs: No Transportation Needs (2/6/2024)    PRAPARE - Transportation     Lack of Transportation (Medical): No     Lack of Transportation (Non-Medical): No   Physical Activity: Inactive (12/9/2024)    Exercise Vital Sign     Days of Exercise per Week: 0 days     Minutes of Exercise per Session: 0 min   Stress: Stress Concern Present (12/9/2024)    [employer] - Occupational Stress Questionnaire     Feeling of Stress : To some extent   Housing Stability: Low Risk  (2/6/2024)    Housing Stability Vital Sign     Unable to Pay for Housing in the Last Year: No     Number of Places Lived in the Last Year: 1     Unstable Housing in the Last Year: No      Family History   Problem Relation Name Age of Onset    Ulcerative colitis Mother      Heart disease Father Anthony Chyna     Alcohol abuse Paternal Grandmother Briana chyna       Review of patient's allergies indicates:  No Known Allergies    Review of Systems   Constitutional:  Negative for appetite change and unexpected weight change.   HENT:  Negative for mouth sores.    Respiratory:  Negative for cough and shortness of breath.    Cardiovascular:  Negative for chest pain.   Gastrointestinal:  Negative for abdominal pain and diarrhea.   Genitourinary:  Negative for frequency.   Integumentary:  Negative for rash.   Hematological:  Negative for adenopathy.   Psychiatric/Behavioral:  The patient is nervous/anxious.          Objective:      Physical Exam  Vitals reviewed.   Constitutional:       General: She is not in acute distress.     Appearance: Normal appearance.   HENT:      Head: Normocephalic and atraumatic.      Nose: Nose normal.      Mouth/Throat:      Mouth: Mucous membranes are moist.   Eyes:      Extraocular Movements: Extraocular movements intact.      Conjunctiva/sclera: Conjunctivae normal.   Cardiovascular:      Rate and Rhythm: Normal rate and regular rhythm.      Pulses: Normal pulses.      Heart sounds: Normal heart sounds.   Pulmonary:      Effort: Pulmonary effort is normal.      Breath sounds: Normal breath sounds.   Musculoskeletal:         General: No swelling. Normal range of motion.      Cervical back: Normal range of motion and neck supple.      Right lower leg: No edema.      Left lower leg: No edema.   Skin:     General: Skin is warm and dry.   Neurological:      General: No focal deficit present.      Mental Status: She is alert and oriented to person, place, and time. Mental status is at baseline.   Psychiatric:         Mood and Affect: Mood normal.         Behavior: Behavior normal.         LABS AND IMAGING REVIEWED IN EPIC          Assessment:   pT2, N1a, M0 stage II invasive ductal carcinoma, grade 3, ER 89.9% positive, PA 32.6% positive, HER2 Tyshawn negative by IHC (0) with a Ki-67 of 81.8%.        Plan:       We had a long conversation about the overall good prognosis.  We discussed Oncotype DX results.  Goal of  treatment is cure.    I recommended dose dense AC followed by weekly Taxol.    She will then need adjuvant radiation.    I explained that due to her hormone receptor positivity, she would need adjuvant endocrine therapy. I would recommend 10 years of therapy due to bonilla involvement. I also discussed the possibility of ovarian suppression + aromatase inhibitor. She did inquire about having a tubal ligation and ablation, but now her plan is to have a bilateral oophorectomy in July 2025.    Discussed fertility, patient is done with child bearing.    CT chest, abdomen, and pelvis done on 10/02/2024 showed no evidence of disease.    Echocardiogram done on 10/10/2024 showed an EF of 60-65%.    Started AC in a dose dense fashion every 2 weeks on 10/28/2024.  She has completed her 4 doses and is now receiving weekly Taxol. Completed 16 cycles of weekly taxol on 03/24/2025 .    Radiation oncology, Dr. Amanda.  She sees her on 04/01/2025.    She will be having her hysterectomy and bilateral oophorectomy on 07/09/2025.    She will return to clinic in 6 weeks and we will discuss whether or not we will use tamoxifen until she has her surgery versus Lupron and aromatase inhibitor for 2 months until she has a surgery.    Visit today included increased complexity associated with the care of the episodic problem breast cancer on chemotherapy, addressing and managing the longitudinal care of the patient's breast cancer.

## 2025-03-27 ENCOUNTER — OFFICE VISIT (OUTPATIENT)
Dept: HEMATOLOGY/ONCOLOGY | Facility: CLINIC | Age: 40
End: 2025-03-27
Payer: COMMERCIAL

## 2025-03-27 ENCOUNTER — PATIENT MESSAGE (OUTPATIENT)
Dept: HEMATOLOGY/ONCOLOGY | Facility: CLINIC | Age: 40
End: 2025-03-27

## 2025-03-27 ENCOUNTER — LAB VISIT (OUTPATIENT)
Dept: LAB | Facility: HOSPITAL | Age: 40
End: 2025-03-27
Attending: NURSE PRACTITIONER
Payer: COMMERCIAL

## 2025-03-27 VITALS
DIASTOLIC BLOOD PRESSURE: 81 MMHG | SYSTOLIC BLOOD PRESSURE: 121 MMHG | RESPIRATION RATE: 18 BRPM | HEART RATE: 85 BPM | BODY MASS INDEX: 23.92 KG/M2 | HEIGHT: 62 IN | OXYGEN SATURATION: 100 % | TEMPERATURE: 98 F | WEIGHT: 130 LBS

## 2025-03-27 DIAGNOSIS — C50.911 INFILTRATING DUCTAL CARCINOMA OF RIGHT BREAST: ICD-10-CM

## 2025-03-27 DIAGNOSIS — C77.3 MALIGNANT NEOPLASM METASTATIC TO LYMPH NODE OF AXILLA: ICD-10-CM

## 2025-03-27 DIAGNOSIS — C50.911 INFILTRATING DUCTAL CARCINOMA OF RIGHT BREAST: Primary | ICD-10-CM

## 2025-03-27 DIAGNOSIS — R53.1 WEAKNESS: ICD-10-CM

## 2025-03-27 LAB
ALBUMIN SERPL-MCNC: 3.5 G/DL (ref 3.5–5)
ALBUMIN/GLOB SERPL: 1.2 RATIO (ref 1.1–2)
ALP SERPL-CCNC: 49 UNIT/L (ref 40–150)
ALT SERPL-CCNC: 17 UNIT/L (ref 0–55)
ANION GAP SERPL CALC-SCNC: 6 MEQ/L
AST SERPL-CCNC: 31 UNIT/L (ref 11–45)
BASOPHILS # BLD AUTO: 0.01 X10(3)/MCL
BASOPHILS NFR BLD AUTO: 0.2 %
BILIRUB SERPL-MCNC: 0.3 MG/DL
BUN SERPL-MCNC: 13.7 MG/DL (ref 7–18.7)
CALCIUM SERPL-MCNC: 9.1 MG/DL (ref 8.4–10.2)
CHLORIDE SERPL-SCNC: 109 MMOL/L (ref 98–107)
CO2 SERPL-SCNC: 26 MMOL/L (ref 22–29)
CREAT SERPL-MCNC: 0.66 MG/DL (ref 0.55–1.02)
CREAT/UREA NIT SERPL: 21
EOSINOPHIL # BLD AUTO: 0.06 X10(3)/MCL (ref 0–0.9)
EOSINOPHIL NFR BLD AUTO: 1.4 %
ERYTHROCYTE [DISTWIDTH] IN BLOOD BY AUTOMATED COUNT: 13.4 % (ref 11.5–17)
GFR SERPLBLD CREATININE-BSD FMLA CKD-EPI: >60 ML/MIN/1.73/M2
GLOBULIN SER-MCNC: 2.9 GM/DL (ref 2.4–3.5)
GLUCOSE SERPL-MCNC: 106 MG/DL (ref 74–100)
HCT VFR BLD AUTO: 32.8 % (ref 37–47)
HGB BLD-MCNC: 11.1 G/DL (ref 12–16)
IMM GRANULOCYTES # BLD AUTO: 0.01 X10(3)/MCL (ref 0–0.04)
IMM GRANULOCYTES NFR BLD AUTO: 0.2 %
LYMPHOCYTES # BLD AUTO: 0.8 X10(3)/MCL (ref 0.6–4.6)
LYMPHOCYTES NFR BLD AUTO: 18.8 %
MAGNESIUM SERPL-MCNC: 1.8 MG/DL (ref 1.6–2.6)
MCH RBC QN AUTO: 32.2 PG (ref 27–31)
MCHC RBC AUTO-ENTMCNC: 33.8 G/DL (ref 33–36)
MCV RBC AUTO: 95.1 FL (ref 80–94)
MONOCYTES # BLD AUTO: 0.17 X10(3)/MCL (ref 0.1–1.3)
MONOCYTES NFR BLD AUTO: 4 %
NEUTROPHILS # BLD AUTO: 3.2 X10(3)/MCL (ref 2.1–9.2)
NEUTROPHILS NFR BLD AUTO: 75.4 %
PLATELET # BLD AUTO: 259 X10(3)/MCL (ref 130–400)
PMV BLD AUTO: 8.5 FL (ref 7.4–10.4)
POTASSIUM SERPL-SCNC: 4.4 MMOL/L (ref 3.5–5.1)
PROT SERPL-MCNC: 6.4 GM/DL (ref 6.4–8.3)
RBC # BLD AUTO: 3.45 X10(6)/MCL (ref 4.2–5.4)
SODIUM SERPL-SCNC: 141 MMOL/L (ref 136–145)
WBC # BLD AUTO: 4.25 X10(3)/MCL (ref 4.5–11.5)

## 2025-03-27 PROCEDURE — 1159F MED LIST DOCD IN RCRD: CPT | Mod: CPTII,S$GLB,, | Performed by: INTERNAL MEDICINE

## 2025-03-27 PROCEDURE — 99999 PR PBB SHADOW E&M-EST. PATIENT-LVL IV: CPT | Mod: PBBFAC,,, | Performed by: INTERNAL MEDICINE

## 2025-03-27 PROCEDURE — 1160F RVW MEDS BY RX/DR IN RCRD: CPT | Mod: CPTII,S$GLB,, | Performed by: INTERNAL MEDICINE

## 2025-03-27 PROCEDURE — 3008F BODY MASS INDEX DOCD: CPT | Mod: CPTII,S$GLB,, | Performed by: INTERNAL MEDICINE

## 2025-03-27 PROCEDURE — 36415 COLL VENOUS BLD VENIPUNCTURE: CPT

## 2025-03-27 PROCEDURE — 3074F SYST BP LT 130 MM HG: CPT | Mod: CPTII,S$GLB,, | Performed by: INTERNAL MEDICINE

## 2025-03-27 PROCEDURE — 85025 COMPLETE CBC W/AUTO DIFF WBC: CPT

## 2025-03-27 PROCEDURE — G2211 COMPLEX E/M VISIT ADD ON: HCPCS | Mod: S$GLB,,, | Performed by: INTERNAL MEDICINE

## 2025-03-27 PROCEDURE — 3079F DIAST BP 80-89 MM HG: CPT | Mod: CPTII,S$GLB,, | Performed by: INTERNAL MEDICINE

## 2025-03-27 PROCEDURE — 83735 ASSAY OF MAGNESIUM: CPT

## 2025-03-27 PROCEDURE — 80053 COMPREHEN METABOLIC PANEL: CPT

## 2025-03-27 PROCEDURE — 99214 OFFICE O/P EST MOD 30 MIN: CPT | Mod: S$GLB,,, | Performed by: INTERNAL MEDICINE

## 2025-04-09 ENCOUNTER — CLINICAL SUPPORT (OUTPATIENT)
Dept: RADIATION THERAPY | Facility: HOSPITAL | Age: 40
End: 2025-04-09
Attending: RADIOLOGY
Payer: COMMERCIAL

## 2025-04-09 PROCEDURE — 77332 RADIATION TREATMENT AID(S): CPT | Mod: 59 | Performed by: RADIOLOGY

## 2025-04-09 PROCEDURE — 77334 RADIATION TREATMENT AID(S): CPT | Performed by: RADIOLOGY

## 2025-04-09 PROCEDURE — 77290 THER RAD SIMULAJ FIELD CPLX: CPT | Performed by: RADIOLOGY

## 2025-04-11 PROCEDURE — 77295 3-D RADIOTHERAPY PLAN: CPT | Performed by: RADIOLOGY

## 2025-04-11 PROCEDURE — 77334 RADIATION TREATMENT AID(S): CPT | Performed by: RADIOLOGY

## 2025-04-11 PROCEDURE — 77300 RADIATION THERAPY DOSE PLAN: CPT | Performed by: RADIOLOGY

## 2025-04-14 ENCOUNTER — LAB VISIT (OUTPATIENT)
Dept: LAB | Facility: HOSPITAL | Age: 40
End: 2025-04-14
Attending: RADIOLOGY
Payer: COMMERCIAL

## 2025-04-14 DIAGNOSIS — C50.111 MALIGNANT NEOPLASM OF CENTRAL PORTION OF RIGHT FEMALE BREAST: Primary | ICD-10-CM

## 2025-04-14 LAB — B-HCG UR QL: NEGATIVE

## 2025-04-14 PROCEDURE — 81025 URINE PREGNANCY TEST: CPT

## 2025-04-17 PROCEDURE — 77280 THER RAD SIMULAJ FIELD SMPL: CPT | Performed by: RADIOLOGY

## 2025-04-17 PROCEDURE — 77412 RADIATION TX DELIVERY LVL 3: CPT | Performed by: RADIOLOGY

## 2025-04-18 PROCEDURE — 77412 RADIATION TX DELIVERY LVL 3: CPT

## 2025-04-20 DIAGNOSIS — F41.1 ANXIETY STATE: ICD-10-CM

## 2025-04-21 DIAGNOSIS — F90.0 ATTENTION DEFICIT HYPERACTIVITY DISORDER (ADHD), PREDOMINANTLY INATTENTIVE TYPE: ICD-10-CM

## 2025-04-21 PROCEDURE — 77412 RADIATION TX DELIVERY LVL 3: CPT | Performed by: RADIOLOGY

## 2025-04-21 PROCEDURE — 77336 RADIATION PHYSICS CONSULT: CPT | Performed by: RADIOLOGY

## 2025-04-21 RX ORDER — ALPRAZOLAM 0.5 MG/1
TABLET ORAL
Qty: 90 TABLET | Refills: 2 | Status: SHIPPED | OUTPATIENT
Start: 2025-04-21

## 2025-04-21 RX ORDER — DEXTROAMPHETAMINE SACCHARATE, AMPHETAMINE ASPARTATE, DEXTROAMPHETAMINE SULFATE AND AMPHETAMINE SULFATE 5; 5; 5; 5 MG/1; MG/1; MG/1; MG/1
1 TABLET ORAL 3 TIMES DAILY
Qty: 90 TABLET | Refills: 0 | Status: SHIPPED | OUTPATIENT
Start: 2025-04-21

## 2025-04-22 PROCEDURE — 77412 RADIATION TX DELIVERY LVL 3: CPT | Performed by: RADIOLOGY

## 2025-04-24 PROCEDURE — 77412 RADIATION TX DELIVERY LVL 3: CPT | Performed by: RADIOLOGY

## 2025-04-25 PROCEDURE — 77417 THER RADIOLOGY PORT IMAGE(S): CPT

## 2025-04-25 PROCEDURE — 77412 RADIATION TX DELIVERY LVL 3: CPT

## 2025-04-28 PROCEDURE — 77412 RADIATION TX DELIVERY LVL 3: CPT | Performed by: RADIOLOGY

## 2025-04-28 PROCEDURE — 77336 RADIATION PHYSICS CONSULT: CPT | Performed by: RADIOLOGY

## 2025-04-29 PROCEDURE — 77412 RADIATION TX DELIVERY LVL 3: CPT | Performed by: RADIOLOGY

## 2025-04-30 PROCEDURE — 77412 RADIATION TX DELIVERY LVL 3: CPT | Performed by: RADIOLOGY

## 2025-05-01 ENCOUNTER — CLINICAL SUPPORT (OUTPATIENT)
Dept: RADIATION THERAPY | Facility: HOSPITAL | Age: 40
End: 2025-05-01
Attending: RADIOLOGY
Payer: COMMERCIAL

## 2025-05-01 PROCEDURE — 77412 RADIATION TX DELIVERY LVL 3: CPT | Performed by: RADIOLOGY

## 2025-05-05 PROCEDURE — 77412 RADIATION TX DELIVERY LVL 3: CPT | Performed by: RADIOLOGY

## 2025-05-05 PROCEDURE — 77336 RADIATION PHYSICS CONSULT: CPT | Performed by: RADIOLOGY

## 2025-05-05 PROCEDURE — 77417 THER RADIOLOGY PORT IMAGE(S): CPT | Performed by: RADIOLOGY

## 2025-05-06 PROCEDURE — 77412 RADIATION TX DELIVERY LVL 3: CPT | Performed by: RADIOLOGY

## 2025-05-08 PROCEDURE — 77412 RADIATION TX DELIVERY LVL 3: CPT | Performed by: RADIOLOGY

## 2025-05-09 PROCEDURE — 77412 RADIATION TX DELIVERY LVL 3: CPT | Performed by: RADIOLOGY

## 2025-05-13 PROCEDURE — 77412 RADIATION TX DELIVERY LVL 3: CPT | Performed by: RADIOLOGY

## 2025-05-14 PROCEDURE — 77412 RADIATION TX DELIVERY LVL 3: CPT | Performed by: RADIOLOGY

## 2025-05-14 PROCEDURE — 77280 THER RAD SIMULAJ FIELD SMPL: CPT | Performed by: RADIOLOGY

## 2025-05-16 PROCEDURE — 77412 RADIATION TX DELIVERY LVL 3: CPT

## 2025-05-19 DIAGNOSIS — F90.0 ATTENTION DEFICIT HYPERACTIVITY DISORDER (ADHD), PREDOMINANTLY INATTENTIVE TYPE: ICD-10-CM

## 2025-05-19 PROCEDURE — 77412 RADIATION TX DELIVERY LVL 3: CPT | Performed by: RADIOLOGY

## 2025-05-19 PROCEDURE — 77336 RADIATION PHYSICS CONSULT: CPT | Performed by: RADIOLOGY

## 2025-05-19 RX ORDER — DEXTROAMPHETAMINE SACCHARATE, AMPHETAMINE ASPARTATE, DEXTROAMPHETAMINE SULFATE AND AMPHETAMINE SULFATE 5; 5; 5; 5 MG/1; MG/1; MG/1; MG/1
1 TABLET ORAL 3 TIMES DAILY
Qty: 90 TABLET | Refills: 0 | Status: SHIPPED | OUTPATIENT
Start: 2025-05-19

## 2025-05-20 PROCEDURE — 77412 RADIATION TX DELIVERY LVL 3: CPT | Performed by: RADIOLOGY

## 2025-05-22 PROCEDURE — 77412 RADIATION TX DELIVERY LVL 3: CPT | Performed by: RADIOLOGY

## 2025-05-29 ENCOUNTER — OFFICE VISIT (OUTPATIENT)
Dept: HEMATOLOGY/ONCOLOGY | Facility: CLINIC | Age: 40
End: 2025-05-29
Payer: COMMERCIAL

## 2025-05-29 VITALS
RESPIRATION RATE: 18 BRPM | OXYGEN SATURATION: 97 % | BODY MASS INDEX: 23.92 KG/M2 | HEIGHT: 62 IN | SYSTOLIC BLOOD PRESSURE: 106 MMHG | DIASTOLIC BLOOD PRESSURE: 72 MMHG | HEART RATE: 90 BPM | WEIGHT: 130 LBS

## 2025-05-29 DIAGNOSIS — C50.911 INFILTRATING DUCTAL CARCINOMA OF RIGHT BREAST: Primary | ICD-10-CM

## 2025-05-29 PROCEDURE — 1160F RVW MEDS BY RX/DR IN RCRD: CPT | Mod: CPTII,S$GLB,, | Performed by: INTERNAL MEDICINE

## 2025-05-29 PROCEDURE — 3078F DIAST BP <80 MM HG: CPT | Mod: CPTII,S$GLB,, | Performed by: INTERNAL MEDICINE

## 2025-05-29 PROCEDURE — 1159F MED LIST DOCD IN RCRD: CPT | Mod: CPTII,S$GLB,, | Performed by: INTERNAL MEDICINE

## 2025-05-29 PROCEDURE — 3074F SYST BP LT 130 MM HG: CPT | Mod: CPTII,S$GLB,, | Performed by: INTERNAL MEDICINE

## 2025-05-29 PROCEDURE — 99214 OFFICE O/P EST MOD 30 MIN: CPT | Mod: S$GLB,,, | Performed by: INTERNAL MEDICINE

## 2025-05-29 PROCEDURE — 99999 PR PBB SHADOW E&M-EST. PATIENT-LVL IV: CPT | Mod: PBBFAC,,, | Performed by: INTERNAL MEDICINE

## 2025-05-29 PROCEDURE — G2211 COMPLEX E/M VISIT ADD ON: HCPCS | Mod: S$GLB,,, | Performed by: INTERNAL MEDICINE

## 2025-05-29 PROCEDURE — 3008F BODY MASS INDEX DOCD: CPT | Mod: CPTII,S$GLB,, | Performed by: INTERNAL MEDICINE

## 2025-05-29 NOTE — PROGRESS NOTES
Subjective:       Patient ID: Joo Haywood is a 40 y.o. female.    Chief Complaint: Follow-up (Pt has no concerns today)      Diagnosis:  pT2, N1a, M0 stage II invasive ductal carcinoma, grade 3, ER 89.9% positive, MA 32.6% positive, HER2 Tyshawn negative by IHC (0) with a Ki-67 of 81.8%.    Current Treatment:   Patient will need adjuvant endocrine therapy. Having TLH/BSO on 07/09/2025, will then start aromatase inhibitor.    Treatment History:  Right-sided lumpectomy and sentinel lymph node on 08/09/2024.  Adjuvant chemotherapy in a dose dense fashion every 2 weeks on 10/28/2024.  She has completed her 4 doses and is now receiving weekly Taxol. Completed 12 cycles of weekly taxol on 03/24/2025 .   Radiation to right breast started on 04/17/2025 and completed on 05/22/2025 with Dr. Amanda.    HPI:  40-year-old female who palpated a breast mass in January 2024.  After undergoing some insurance changes, she underwent  screening digital mammogram with tomosynthesis on 06/13/2024 at breast Center [facility].  This showed a subtle 2.0 cm round focal asymmetry in the right breast at the 12 o'clock position.  This corresponded to a lump that the patient had palpated.  This was BI-RADS 0, needs additional assessment.  Patient then underwent bilateral breast ultrasound on 06/26/2024, this revealed a mostly round hypoechoic mass with a microlobulated margin and heterogeneous texture located in the right breast at the 11 o'clock position 4 cm from the nipple measuring 2.1 x 1.8 x 2.2 cm.  This was suspicious for malignancy.  There were no other masses or other findings that were suspicious.  The right axilla showed nonspecific benign-appearing lymph nodes without evidence of malignancy.  This was read as BI-RADS 4, biopsy recommended.  Patient underwent a biopsy on 07/01/2024, pathology revealed invasive ductal carcinoma, grade 3.  Breast panel showed ER 89.9% positive, MA 32.6% positive, HER2 negative by IHC (0) with a  Ki-67 of 81.8%.  The patient was then seen by Dr. Candelario Amin on 07/11/2024, plan was for right-sided lumpectomy and sentinel lymph node.  She had a breast MRI on 07/18/2024 that showed a 2.8 x 2.4 x 2.3 cm round enhancing mass with microlobulated and spiculated margins located in the right breast at the 12 o'clock position 4 cm from the nipple, this was consistent with patient's biopsy-proven invasive ductal carcinoma.  There were no lymph nodes in the right axilla or in the internal mammary chain that were suspicious.  The patient then underwent a right breast lumpectomy with sentinel lymph node on 08/09/2024.  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  Superior margin reexcision was done and showed benign tissue.  There were 8 lymph nodes removed, 1 was positive for metastatic invasive ductal carcinoma.  Final pathologic stage was pT2, N1a, M0 stage II.  I initially saw the patient on 09/04/2024. Oncotype DX returned with a score of 44, greater than 12% chance of distant recurrence at 9 years with an AI or tamoxifen alone, and chemotherapy benefit.     CT chest, abdomen, and pelvis done on 10/02/2024 showed no evidence of metastatic disease in the chest, abdomen, or pelvis.    Echocardiogram done on 10/10/2024 showed an EF of 60-65%.    Adjuvant chemotherapy in a dose dense fashion every 2 weeks on 10/28/2024.  She has completed her 4 doses and is now receiving weekly Taxol. Completed 12 cycles of weekly taxol on 03/24/2025 .    Adjuvant radiation from 04/17/2025 through 05/22/2025.    Interval History:   Patient presents to clinic for scheduled follow up appointment.  She completed her radiation on 05/22/2025. Will have TLH/BSO on 07/09/2025.    Past Medical History:   Diagnosis Date    ADHD (attention deficit hyperactivity disorder)     Migraine       Past Surgical History:   Procedure Laterality Date    CERVICAL CONIZATION   W/ LASER  2013    FESS, USING COMPUTER-ASSISTED NAVIGATION, WITH  NASAL TURBINATE REDUCTION  2021    Dr. Clemente    FUNCTIONAL ENDOSCOPIC SINUS SURGERY (FESS)      MEDIPORT INSERTION, SINGLE Left 10/18/2024    US GUIDED BIOPSY BREAST RIGHT Right 07/01/2024    [facility] right breast lumpectomy with right axillary sentinel node biopsy Right 08/09/2024     Social History     Socioeconomic History    Marital status: Significant Other   Tobacco Use    Smoking status: Never     Passive exposure: Never    Smokeless tobacco: Never   Substance and Sexual Activity    Alcohol use: Not Currently    Drug use: Never    Sexual activity: Yes     Partners: Male     Birth control/protection: OCP     Social Drivers of Health     Financial Resource Strain: Low Risk  (12/9/2024)    Overall Financial Resource Strain (CARDIA)     Difficulty of Paying Living Expenses: Not very hard   Food Insecurity: No Food Insecurity (12/9/2024)    Hunger Vital Sign     Worried About Running Out of Food in the Last Year: Never true     Ran Out of Food in the Last Year: Never true   Transportation Needs: No Transportation Needs (2/6/2024)    PRAPARE - Transportation     Lack of Transportation (Medical): No     Lack of Transportation (Non-Medical): No   Physical Activity: Inactive (12/9/2024)    Exercise Vital Sign     Days of Exercise per Week: 0 days     Minutes of Exercise per Session: 0 min   Stress: Stress Concern Present (12/9/2024)    [employer] - Occupational Stress Questionnaire     Feeling of Stress : To some extent   Housing Stability: Low Risk  (2/6/2024)    Housing Stability Vital Sign     Unable to Pay for Housing in the Last Year: No     Number of Places Lived in the Last Year: 1     Unstable Housing in the Last Year: No      Family History   Problem Relation Name Age of Onset    Ulcerative colitis Mother      Heart disease Father Anthony Rodney     Alcohol abuse Paternal Grandmother Briana clemente       Review of patient's allergies indicates:  No Known Allergies   Review of Systems    Constitutional:  Negative for appetite change and unexpected weight change.   HENT:  Negative for mouth sores.    Respiratory:  Negative for cough and shortness of breath.    Cardiovascular:  Negative for chest pain.   Gastrointestinal:  Negative for abdominal pain and diarrhea.   Genitourinary:  Negative for frequency.   Integumentary:  Negative for rash.   Hematological:  Negative for adenopathy.   Psychiatric/Behavioral:  The patient is nervous/anxious.          Objective:      Physical Exam  Vitals reviewed.   Constitutional:       General: She is not in acute distress.     Appearance: Normal appearance.   HENT:      Head: Normocephalic and atraumatic.      Nose: Nose normal.      Mouth/Throat:      Mouth: Mucous membranes are moist.   Eyes:      Extraocular Movements: Extraocular movements intact.      Conjunctiva/sclera: Conjunctivae normal.   Cardiovascular:      Rate and Rhythm: Normal rate and regular rhythm.      Pulses: Normal pulses.      Heart sounds: Normal heart sounds.   Pulmonary:      Effort: Pulmonary effort is normal.      Breath sounds: Normal breath sounds.   Musculoskeletal:         General: No swelling. Normal range of motion.      Cervical back: Normal range of motion and neck supple.      Right lower leg: No edema.      Left lower leg: No edema.   Skin:     General: Skin is warm and dry.   Neurological:      General: No focal deficit present.      Mental Status: She is alert and oriented to person, place, and time. Mental status is at baseline.   Psychiatric:         Mood and Affect: Mood normal.         Behavior: Behavior normal.         LABS AND IMAGING REVIEWED IN EPIC          Assessment:   pT2, N1a, M0 stage II invasive ductal carcinoma, grade 3, ER 89.9% positive, MN 32.6% positive, HER2 Tyshawn negative by IHC (0) with a Ki-67 of 81.8%.        Plan:       We had a long conversation about the overall good prognosis.  We discussed Oncotype DX results.  Goal of treatment is cure.    CT  chest, abdomen, and pelvis done on 10/02/2024 showed no evidence of disease.    Discussed fertility, patient is done with child bearing.    I recommended dose dense AC followed by weekly Taxol. Completed on 03/24/2025.    Completed radiation on 05/22/2025.    I explained that due to her hormone receptor positivity, she would need adjuvant endocrine therapy. I would recommend 10 years of therapy due to bonilla involvement. I also discussed the possibility of ovarian suppression + aromatase inhibitor.     The patient opted to have TLH/BSO, this is scheduled for 07/09/2025.    We will hold off on endocrine therapy until after her TLH/BSO.    Return to clinic in 8 weeks.    Visit today included increased complexity associated with the care of the episodic problem breast cancer on chemotherapy, addressing and managing the longitudinal care of the patient's breast cancer.

## 2025-05-30 DIAGNOSIS — B00.1 HERPES LABIALIS: Primary | ICD-10-CM

## 2025-05-30 RX ORDER — VALACYCLOVIR HYDROCHLORIDE 1 G/1
2000 TABLET, FILM COATED ORAL EVERY 12 HOURS
Qty: 30 TABLET | Refills: 11 | Status: SHIPPED | OUTPATIENT
Start: 2025-05-30

## 2025-06-02 ENCOUNTER — PATIENT MESSAGE (OUTPATIENT)
Dept: FAMILY MEDICINE | Facility: CLINIC | Age: 40
End: 2025-06-02
Payer: COMMERCIAL

## 2025-06-09 ENCOUNTER — OFFICE VISIT (OUTPATIENT)
Dept: FAMILY MEDICINE | Facility: CLINIC | Age: 40
End: 2025-06-09
Payer: COMMERCIAL

## 2025-06-09 VITALS
OXYGEN SATURATION: 98 % | DIASTOLIC BLOOD PRESSURE: 86 MMHG | BODY MASS INDEX: 23.92 KG/M2 | HEIGHT: 62 IN | WEIGHT: 130 LBS | TEMPERATURE: 98 F | SYSTOLIC BLOOD PRESSURE: 127 MMHG

## 2025-06-09 DIAGNOSIS — F32.A DEPRESSION, UNSPECIFIED DEPRESSION TYPE: ICD-10-CM

## 2025-06-09 DIAGNOSIS — G47.00 INSOMNIA, UNSPECIFIED TYPE: ICD-10-CM

## 2025-06-09 DIAGNOSIS — C50.911 INFILTRATING DUCTAL CARCINOMA OF RIGHT BREAST: ICD-10-CM

## 2025-06-09 DIAGNOSIS — F41.1 ANXIETY STATE: ICD-10-CM

## 2025-06-09 DIAGNOSIS — F90.0 ATTENTION DEFICIT HYPERACTIVITY DISORDER (ADHD), PREDOMINANTLY INATTENTIVE TYPE: ICD-10-CM

## 2025-06-09 DIAGNOSIS — G43.109 MIGRAINE WITH AURA AND WITHOUT STATUS MIGRAINOSUS, NOT INTRACTABLE: Primary | ICD-10-CM

## 2025-06-09 DIAGNOSIS — R23.2 HOT FLASHES: ICD-10-CM

## 2025-06-09 PROCEDURE — G2211 COMPLEX E/M VISIT ADD ON: HCPCS | Mod: 95,,, | Performed by: NURSE PRACTITIONER

## 2025-06-09 PROCEDURE — 98006 SYNCH AUDIO-VIDEO EST MOD 30: CPT | Mod: 95,,, | Performed by: NURSE PRACTITIONER

## 2025-06-09 PROCEDURE — 1160F RVW MEDS BY RX/DR IN RCRD: CPT | Mod: CPTII,95,, | Performed by: NURSE PRACTITIONER

## 2025-06-09 PROCEDURE — 3079F DIAST BP 80-89 MM HG: CPT | Mod: CPTII,95,, | Performed by: NURSE PRACTITIONER

## 2025-06-09 PROCEDURE — 3074F SYST BP LT 130 MM HG: CPT | Mod: CPTII,95,, | Performed by: NURSE PRACTITIONER

## 2025-06-09 PROCEDURE — 1159F MED LIST DOCD IN RCRD: CPT | Mod: CPTII,95,, | Performed by: NURSE PRACTITIONER

## 2025-06-09 NOTE — PROGRESS NOTES
TELEMEDICINE VISIT     Patient ID: Joo Haywood is a 40 y.o. female.  MRN: [id number]  : 1985    Subjective:        TELEMEDICINE  The patient location is: home  The chief complaint leading to consultation is: Migraine (3m fu), Anxiety (3m fu), ADHD (3m fu), Depression (3m fu), and Insomnia (3m fu)     Visit type: Virtual visit with synchronous audio and video    Total time spent with patient: 20 minutes  30 minutes of total time spent on the encounter, which includes face to face time and non-face to face time preparing to see the patient (eg, review of tests), obtaining and/or reviewing separately obtained history, documenting clinical information in the electronic or other health record, independently interpreting results (not separately reported) and communicating results to the patient/family/caregiver, or care coordination (not separately reported).    Each patient to whom he or she provides medical services by telemedicine is:  (1) informed of the relationship between the physician and patient and the respective role of any other health care provider with respect to management of the patient; and (2) notified that he or she may decline to receive medical services by telemedicine and may withdraw from such care at any time.    History of Present Illness    CHIEF COMPLAINT:  Patient seen today via virtual visit for three-month medication follow-up    CURRENT SYMPTOMS:  She reports experiencing fatigue with fluctuating energy levels throughout her menstrual cycle, alternating between extreme fatigue and high energy. Her hot flashes have somewhat improved but persist.    MIGRAINES:  She experiences menstrual-related migraines that respond well to Nurtec.    ONCOLOGY:  She has completed radiation therapy and is scheduled for total hysterectomy and oophorectomy on .    CURRENT MEDICATIONS:  She takes Adderall 2-3 times daily for ADHD, Xanax nightly for anxiety, and Amitriptyline 20mg at  bedtime for depression.           Health maintenance reviewed with the patient.  Health maintenance completed:  Health Maintenance Topics with due status: Not Due       Topic Last Completion Date    Cervical Cancer Screening 06/27/2023    Hemoglobin A1c (Prediabetes) 12/11/2024    RSV Vaccine (Age 60+ and Pregnant patients) Not Due      Health maintenance due:  Health Maintenance Due   Topic Date Due    Mammogram  06/13/2025      ROS:  Review of Systems   Constitutional:  Negative for activity change and unexpected weight change.   HENT:  Negative for hearing loss, rhinorrhea and trouble swallowing.    Eyes:  Negative for discharge and visual disturbance.   Respiratory:  Negative for chest tightness and wheezing.    Cardiovascular:  Negative for chest pain and palpitations.   Gastrointestinal:  Negative for blood in stool, constipation, diarrhea and vomiting.   Endocrine: Negative for polydipsia and polyuria.   Genitourinary:  Negative for difficulty urinating, dysuria, hematuria and menstrual problem.   Musculoskeletal:  Positive for arthralgias. Negative for joint swelling and neck pain.   Neurological:  Positive for headaches. Negative for weakness.   Psychiatric/Behavioral:  Negative for dysphoric mood.       Complete ROS negative except as stated in HPI  History:     Past Medical History:   Diagnosis Date    ADHD (attention deficit hyperactivity disorder)     Migraine       Past Surgical History:   Procedure Laterality Date    CERVICAL CONIZATION   W/ LASER  2013    FESS, USING COMPUTER-ASSISTED NAVIGATION, WITH NASAL TURBINATE REDUCTION  2021    Dr. Stevens    FUNCTIONAL ENDOSCOPIC SINUS SURGERY (FESS)      MEDIPORT INSERTION, SINGLE Left 10/18/2024    US GUIDED BIOPSY BREAST RIGHT Right 07/01/2024    [facility] right breast lumpectomy with right axillary sentinel node biopsy Right 08/09/2024     Family History   Problem Relation Name Age of Onset    Ulcerative colitis Mother      Heart disease Father Anthony Stevens      "Alcohol abuse Paternal Grandmother Briana clemente       Social History     Tobacco Use    Smoking status: Never     Passive exposure: Never    Smokeless tobacco: Never   Substance and Sexual Activity    Alcohol use: Not Currently    Drug use: Never    Sexual activity: Yes     Partners: Male     Birth control/protection: OCP          Allergies: Review of patient's allergies indicates:  No Known Allergies  Objective:     Vitals:    06/09/25 1446   BP: 127/86   Temp: 97.8 °F (36.6 °C)   SpO2: 98%   Weight: 59 kg (130 lb)   Height: 5' 2" (1.575 m)         Physical Examination:   Physical Exam  Vitals and nursing note reviewed.   Constitutional:       Appearance: Normal appearance.   HENT:      Head: Normocephalic and atraumatic.   Neurological:      General: No focal deficit present.      Mental Status: She is alert and oriented to person, place, and time.   Psychiatric:         Mood and Affect: Mood normal.         Behavior: Behavior normal.         Thought Content: Thought content normal.         Judgment: Judgment normal.           Medications:     Medication List with Changes/Refills   Current Medications    ALPRAZOLAM (XANAX) 0.5 MG TABLET    TAKE ONE TABLET BY MOUTH ONCE A DAY * MAY TAKE TWO TABLETS NIGHTLY AS NEEDED FOR ANXIETY    AMITRIPTYLINE (ELAVIL) 10 MG TABLET        DEXAMETHASONE (DECADRON) 4 MG TAB    TAKE TWO (2) TABLETS (8 MG TOTAL) BY MOUTH ONCE DAILY ON DAYS TWO (2), THREE (3), AND FOUR (4) ONLY OF EACH CHEMOTHERAPY CYCLE (CYCLES 1-4).    DEXTROAMPHETAMINE-AMPHETAMINE (ADDERALL) 20 MG TABLET    Take 1 tablet by mouth 3 (three) times daily.    DOCUSATE SODIUM (COLACE) 100 MG CAPSULE    Take 100 mg by mouth 2 (two) times daily as needed for Constipation.    ENULOSE 10 GRAM/15 ML SOLUTION        LORATADINE (CLARITIN) 10 MG TABLET    Take 10 mg by mouth once daily.    NURTEC 75 MG ODT    PLACE 1 TABLET ON OR UNDER THE TONGUE ONCE DAILY AS NEEDED FOR MIGRAINE    OLANZAPINE (ZYPREXA) 5 MG TABLET    TAKE 1 " TABLET (5 MG TOTAL) BY MOUTH EVERY EVENING ON DAYS 1, TWO (2), THREE (3), AND FOUR (4) OF EACH CHEMOTHERAPY CYCLE (CYCLES 1-4)    ONDANSETRON (ZOFRAN-ODT) 8 MG TBDL    Take 1 tablet (8 mg total) by mouth every 6 (six) hours as needed.    PANTOPRAZOLE (PROTONIX) 40 MG TABLET    Take 1 tablet (40 mg total) by mouth once daily.    PRAMIPEXOLE (MIRAPEX) 0.125 MG TABLET    Take 2 tablets (0.25 mg total) by mouth every evening.    PROCHLORPERAZINE (COMPAZINE) 10 MG TABLET    TAKE 1 TABLET (10 MG TOTAL) BY MOUTH FOUR (4) (FOUR) TIMES DAILY AS NEEDED (NAUSEA).    UNABLE TO FIND    medication name: COLONMAX    VALACYCLOVIR (VALTREX) 1000 MG TABLET    Take 2 tablets (2,000 mg total) by mouth every 12 (twelve) hours.     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  - Noted effectiveness of Nurtec for migraine management, particularly during menstrual cycle.  - Considered recent completion of radiation therapy and upcoming total hysterectomy with oophorectomy.  - Evaluated reports of fatigue, potentially related to recent completion of chemotherapy and radiation for breast cancer  - Discussed potential relationship between menstrual cycle phases and energy levels.    HOT FLASHES:  - Explained that full menopause symptoms are expected post-hysterectomy with oophorectomy.  - discussed option to treat with Veozah if needed    LIFESTYLE CHANGES:  - Started vitamin B complex and vitamin D supplements to help with energy levels.  - Contact the office if any needs arise before or after the upcoming hysterectomy.          1. Migraine with aura  and without status migrainosus, not intractable  Overview:  Diagnosed in her 20's by Dr Tabby Manrique not covered by insurance but worked  Imitrex did not help   On Zomig 5 mg as needed but has side effects  12/05/2023 - start Nurtec 75 mg ODT    Assessment & Plan:  Stable, continue Nurtec as needed, follow-up 3 months with virtual visit.      2. Anxiety state  Overview:  02/06/2024 - start alprazolam 0.25 mg as needed at bedtime    04/11/2024 - alprazolam 0.5 mg at bedtime     July 2024 - diagnosed with breast cancer     08/05/2024 - alprazolam 0.5 mg b.i.d. p.r.n.    10/29/2024 - Xanax 0.5 mg daily as needed and 0.5 mg 2 tablets at bedtime as needed    Assessment & Plan:  Stable, continue Xanax 0.5 mg daily as needed and 1 mg at bedtime as needed.  Follow-up 3 months with virtual visit.      3. Attention deficit hyperactivity disorder (ADHD), predominantly inattentive type  Overview:  DX age 22   Adderall since diagnosis   Adderall 20 mg TID  Previously managed by Dena Rico, PhD psychology     Assessment & Plan:  Stable, continue Adderall, follow-up 3 months with virtual visit.      4. Depression, unspecified depression type  Overview:  03/25/2024 - Viibryd 10 mg daily (never started)    03/12/2025 - increase amitriptyline to 20 mg at bedtime    Assessment & Plan:  Improved with the increased amitriptyline 20 mg at bedtime, follow-up 3 months      5. Infiltrating ductal carcinoma of right breast  Overview:  Initially felt lump January 2024 - insurance changes with work, did not get imaging    06/13/2024 - baseline screening mammogram @ [facility] - breasts are extremely dense, category D, no evidence of malignancy    06/26/2024 - bilateral breast US at [facility] - right breast mass at 11:00 a.m. 4 cm from the nipple, 2 x 1 x 1.8 x 2 x 2 cm, characteristics suspicious for malignancy    07/01/2024 - biopsy showing grade 3 invasive ductal carcinoma, receptors return ER/MT positive, HER2 negative    07/11/2024 - first consult  surgical oncology Dr Amin     07/18/2024 - MRI breast @ [facility] - right breast round enhancing mass with microlobulated and spiculated margins, 2 x 8 x 2.4 x 2 x 3 cm.  No other enhancing masses or any other findings which are suspicious for malignancy    The patient then underwent a right breast lumpectomy with sentinel lymph node on 08/09/2024.  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  Superior margin reexcision was done and showed benign tissue.  There were 8 lymph nodes removed, 1 was positive for metastatic invasive ductal carcinoma.  Final pathologic stage was pT2, N1a, M0 stage II.     09/04/2024 - established care with Dr Barton    CT chest, abdomen, and pelvis done on 10/02/2024 showed no evidence of metastatic disease in the chest, abdomen, or pelvis.     Adjuvant chemotherapy with dose dense AC followed by weekly Taxol.  Started on 10/28/2024 and completed 03/24/2025    Radiation to right breast started 04/17/2025 and completed 05/22/2025 with Dr Amanda    Assessment & Plan:  Recent oncology records reviewed, follow-up 3 months      6. Hot flashes    7. Insomnia, unspecified type  Overview:  09/16/2024 - amitriptyline 10 mg 1-2 tablets at bedtime  09/18/2024 - discontinue amitriptyline due to headache and dry mouth, start trazodone 50 mg at bedtime  11/11/2024 - restarted amitriptyline at bedtime  03/12/2025 - amitriptyline 20 mg at bedtime    Assessment & Plan:  Improved, continue amitriptyline 20 mg at bedtime                Follow Up:   Follow up in about 3 months (around 9/9/2025) for Virtual Visit.    I spent greater than 30 minutes today both in chart review and greater than 50% of that time in discussion with the patient regarding health maintenance, diagnoses, diagnostic tests, medications, treatments, symptom management, expected results and adverse effects. Patient verbalized understanding and all questions were answered.      This note was generated with the assistance of  ambient listening technology. Verbal consent was obtained by the patient and accompanying visitor(s) for the recording of patient appointment to facilitate this note. I attest to having reviewed and edited the generated note for accuracy, though some syntax or spelling errors may persist. Please contact the author of this note for any clarification.

## 2025-06-16 DIAGNOSIS — F90.0 ATTENTION DEFICIT HYPERACTIVITY DISORDER (ADHD), PREDOMINANTLY INATTENTIVE TYPE: ICD-10-CM

## 2025-06-17 RX ORDER — DEXTROAMPHETAMINE SACCHARATE, AMPHETAMINE ASPARTATE, DEXTROAMPHETAMINE SULFATE AND AMPHETAMINE SULFATE 5; 5; 5; 5 MG/1; MG/1; MG/1; MG/1
1 TABLET ORAL 3 TIMES DAILY
Qty: 90 TABLET | Refills: 0 | Status: SHIPPED | OUTPATIENT
Start: 2025-06-17

## 2025-06-23 ENCOUNTER — PATIENT MESSAGE (OUTPATIENT)
Dept: HEMATOLOGY/ONCOLOGY | Facility: CLINIC | Age: 40
End: 2025-06-23
Payer: COMMERCIAL

## 2025-06-27 ENCOUNTER — INFUSION (OUTPATIENT)
Dept: INFUSION THERAPY | Facility: HOSPITAL | Age: 40
End: 2025-06-27
Attending: INTERNAL MEDICINE
Payer: COMMERCIAL

## 2025-06-27 VITALS
HEIGHT: 62 IN | WEIGHT: 132.88 LBS | HEART RATE: 83 BPM | DIASTOLIC BLOOD PRESSURE: 81 MMHG | RESPIRATION RATE: 18 BRPM | SYSTOLIC BLOOD PRESSURE: 114 MMHG | OXYGEN SATURATION: 100 % | BODY MASS INDEX: 24.45 KG/M2 | TEMPERATURE: 98 F

## 2025-06-27 DIAGNOSIS — R53.1 WEAKNESS: Primary | ICD-10-CM

## 2025-06-27 PROCEDURE — 25000003 PHARM REV CODE 250: Performed by: NURSE PRACTITIONER

## 2025-06-27 PROCEDURE — 96366 THER/PROPH/DIAG IV INF ADDON: CPT

## 2025-06-27 PROCEDURE — 63600175 PHARM REV CODE 636 W HCPCS: Performed by: NURSE PRACTITIONER

## 2025-06-27 PROCEDURE — 96365 THER/PROPH/DIAG IV INF INIT: CPT

## 2025-06-27 PROCEDURE — A4216 STERILE WATER/SALINE, 10 ML: HCPCS | Performed by: NURSE PRACTITIONER

## 2025-06-27 RX ORDER — HEPARIN 100 UNIT/ML
500 SYRINGE INTRAVENOUS
OUTPATIENT
Start: 2025-06-27

## 2025-06-27 RX ORDER — SODIUM CHLORIDE 0.9 % (FLUSH) 0.9 %
10 SYRINGE (ML) INJECTION
Status: DISCONTINUED | OUTPATIENT
Start: 2025-06-27 | End: 2025-06-27 | Stop reason: HOSPADM

## 2025-06-27 RX ORDER — HEPARIN 100 UNIT/ML
500 SYRINGE INTRAVENOUS
Status: DISCONTINUED | OUTPATIENT
Start: 2025-06-27 | End: 2025-06-27 | Stop reason: HOSPADM

## 2025-06-27 RX ORDER — SODIUM CHLORIDE 0.9 % (FLUSH) 0.9 %
10 SYRINGE (ML) INJECTION
OUTPATIENT
Start: 2025-06-27

## 2025-06-27 RX ADMIN — Medication 10 ML: at 10:06

## 2025-06-27 RX ADMIN — HEPARIN 500 UNITS: 100 SYRINGE at 10:06

## 2025-06-27 RX ADMIN — FOLIC ACID: 5 INJECTION, SOLUTION INTRAMUSCULAR; INTRAVENOUS; SUBCUTANEOUS at 08:06

## 2025-06-27 NOTE — PLAN OF CARE
1L MVI given; tolerated well; next appointments confirmed with patient; discharged home in stable condition.

## 2025-07-07 ENCOUNTER — PATIENT MESSAGE (OUTPATIENT)
Dept: HEMATOLOGY/ONCOLOGY | Facility: CLINIC | Age: 40
End: 2025-07-07
Payer: COMMERCIAL

## 2025-07-13 DIAGNOSIS — F90.0 ATTENTION DEFICIT HYPERACTIVITY DISORDER (ADHD), PREDOMINANTLY INATTENTIVE TYPE: ICD-10-CM

## 2025-07-14 RX ORDER — DEXTROAMPHETAMINE SACCHARATE, AMPHETAMINE ASPARTATE, DEXTROAMPHETAMINE SULFATE AND AMPHETAMINE SULFATE 5; 5; 5; 5 MG/1; MG/1; MG/1; MG/1
1 TABLET ORAL 3 TIMES DAILY
Qty: 90 TABLET | Refills: 0 | Status: SHIPPED | OUTPATIENT
Start: 2025-07-14

## 2025-07-21 ENCOUNTER — PATIENT MESSAGE (OUTPATIENT)
Dept: HEMATOLOGY/ONCOLOGY | Facility: CLINIC | Age: 40
End: 2025-07-21
Payer: COMMERCIAL

## 2025-07-24 ENCOUNTER — LAB VISIT (OUTPATIENT)
Dept: LAB | Facility: HOSPITAL | Age: 40
End: 2025-07-24
Attending: INTERNAL MEDICINE
Payer: COMMERCIAL

## 2025-07-24 DIAGNOSIS — C50.911 INFILTRATING DUCTAL CARCINOMA OF RIGHT BREAST: ICD-10-CM

## 2025-07-24 LAB
ALBUMIN SERPL-MCNC: 3.8 G/DL (ref 3.5–5)
ALBUMIN/GLOB SERPL: 1 RATIO (ref 1.1–2)
ALP SERPL-CCNC: 97 UNIT/L (ref 40–150)
ALT SERPL-CCNC: 13 UNIT/L (ref 0–55)
ANION GAP SERPL CALC-SCNC: 10 MEQ/L
AST SERPL-CCNC: 27 UNIT/L (ref 11–45)
BASOPHILS # BLD AUTO: 0.02 X10(3)/MCL
BASOPHILS NFR BLD AUTO: 0.4 %
BILIRUB SERPL-MCNC: 0.4 MG/DL
BUN SERPL-MCNC: 14.3 MG/DL (ref 7–18.7)
CALCIUM SERPL-MCNC: 10 MG/DL (ref 8.4–10.2)
CEA SERPL-MCNC: 1.9 NG/ML (ref 0–3)
CHLORIDE SERPL-SCNC: 104 MMOL/L (ref 98–107)
CO2 SERPL-SCNC: 28 MMOL/L (ref 22–29)
CREAT SERPL-MCNC: 0.82 MG/DL (ref 0.55–1.02)
CREAT/UREA NIT SERPL: 17
EOSINOPHIL # BLD AUTO: 0.38 X10(3)/MCL (ref 0–0.9)
EOSINOPHIL NFR BLD AUTO: 6.8 %
ERYTHROCYTE [DISTWIDTH] IN BLOOD BY AUTOMATED COUNT: 11.9 % (ref 11.5–17)
GFR SERPLBLD CREATININE-BSD FMLA CKD-EPI: >60 ML/MIN/1.73/M2
GLOBULIN SER-MCNC: 3.9 GM/DL (ref 2.4–3.5)
GLUCOSE SERPL-MCNC: 91 MG/DL (ref 74–100)
HCT VFR BLD AUTO: 40 % (ref 37–47)
HGB BLD-MCNC: 13.7 G/DL (ref 12–16)
IMM GRANULOCYTES # BLD AUTO: 0 X10(3)/MCL (ref 0–0.04)
IMM GRANULOCYTES NFR BLD AUTO: 0 %
LYMPHOCYTES # BLD AUTO: 1.32 X10(3)/MCL (ref 0.6–4.6)
LYMPHOCYTES NFR BLD AUTO: 23.8 %
MCH RBC QN AUTO: 29.9 PG (ref 27–31)
MCHC RBC AUTO-ENTMCNC: 34.3 G/DL (ref 33–36)
MCV RBC AUTO: 87.3 FL (ref 80–94)
MONOCYTES # BLD AUTO: 0.39 X10(3)/MCL (ref 0.1–1.3)
MONOCYTES NFR BLD AUTO: 7 %
NEUTROPHILS # BLD AUTO: 3.44 X10(3)/MCL (ref 2.1–9.2)
NEUTROPHILS NFR BLD AUTO: 62 %
PLATELET # BLD AUTO: 226 X10(3)/MCL (ref 130–400)
PMV BLD AUTO: 8.4 FL (ref 7.4–10.4)
POTASSIUM SERPL-SCNC: 4 MMOL/L (ref 3.5–5.1)
PROT SERPL-MCNC: 7.7 GM/DL (ref 6.4–8.3)
RBC # BLD AUTO: 4.58 X10(6)/MCL (ref 4.2–5.4)
SODIUM SERPL-SCNC: 142 MMOL/L (ref 136–145)
WBC # BLD AUTO: 5.55 X10(3)/MCL (ref 4.5–11.5)

## 2025-07-24 PROCEDURE — 85025 COMPLETE CBC W/AUTO DIFF WBC: CPT

## 2025-07-24 PROCEDURE — 80053 COMPREHEN METABOLIC PANEL: CPT

## 2025-07-24 PROCEDURE — 82378 CARCINOEMBRYONIC ANTIGEN: CPT

## 2025-07-24 PROCEDURE — 36415 COLL VENOUS BLD VENIPUNCTURE: CPT

## 2025-07-24 PROCEDURE — 86300 IMMUNOASSAY TUMOR CA 15-3: CPT

## 2025-07-25 LAB — CANCER AG15-3 SERPL IA-ACNC: 13 U/ML

## 2025-07-29 ENCOUNTER — PATIENT MESSAGE (OUTPATIENT)
Dept: HEMATOLOGY/ONCOLOGY | Facility: CLINIC | Age: 40
End: 2025-07-29
Payer: COMMERCIAL

## 2025-07-31 ENCOUNTER — OFFICE VISIT (OUTPATIENT)
Dept: HEMATOLOGY/ONCOLOGY | Facility: CLINIC | Age: 40
End: 2025-07-31
Payer: COMMERCIAL

## 2025-07-31 VITALS
WEIGHT: 131 LBS | SYSTOLIC BLOOD PRESSURE: 104 MMHG | BODY MASS INDEX: 24.11 KG/M2 | RESPIRATION RATE: 18 BRPM | OXYGEN SATURATION: 99 % | HEART RATE: 98 BPM | HEIGHT: 62 IN | DIASTOLIC BLOOD PRESSURE: 72 MMHG

## 2025-07-31 DIAGNOSIS — Z79.811 LONG TERM (CURRENT) USE OF AROMATASE INHIBITORS: ICD-10-CM

## 2025-07-31 DIAGNOSIS — C50.911 INFILTRATING DUCTAL CARCINOMA OF RIGHT BREAST: Primary | ICD-10-CM

## 2025-07-31 DIAGNOSIS — C77.3 MALIGNANT NEOPLASM METASTATIC TO LYMPH NODE OF AXILLA: ICD-10-CM

## 2025-07-31 PROCEDURE — 3078F DIAST BP <80 MM HG: CPT | Mod: CPTII,S$GLB,, | Performed by: INTERNAL MEDICINE

## 2025-07-31 PROCEDURE — 99214 OFFICE O/P EST MOD 30 MIN: CPT | Mod: S$GLB,,, | Performed by: INTERNAL MEDICINE

## 2025-07-31 PROCEDURE — G2211 COMPLEX E/M VISIT ADD ON: HCPCS | Mod: S$GLB,,, | Performed by: INTERNAL MEDICINE

## 2025-07-31 PROCEDURE — 99999 PR PBB SHADOW E&M-EST. PATIENT-LVL V: CPT | Mod: PBBFAC,,, | Performed by: INTERNAL MEDICINE

## 2025-07-31 PROCEDURE — 1160F RVW MEDS BY RX/DR IN RCRD: CPT | Mod: CPTII,S$GLB,, | Performed by: INTERNAL MEDICINE

## 2025-07-31 PROCEDURE — 3008F BODY MASS INDEX DOCD: CPT | Mod: CPTII,S$GLB,, | Performed by: INTERNAL MEDICINE

## 2025-07-31 PROCEDURE — 1159F MED LIST DOCD IN RCRD: CPT | Mod: CPTII,S$GLB,, | Performed by: INTERNAL MEDICINE

## 2025-07-31 PROCEDURE — 3074F SYST BP LT 130 MM HG: CPT | Mod: CPTII,S$GLB,, | Performed by: INTERNAL MEDICINE

## 2025-07-31 RX ORDER — METHYLPREDNISOLONE 4 MG/1
4 TABLET ORAL DAILY
Qty: 21 EACH | Refills: 0 | Status: SHIPPED | OUTPATIENT
Start: 2025-07-31

## 2025-07-31 RX ORDER — ANASTROZOLE 1 MG/1
1 TABLET ORAL DAILY
Qty: 30 TABLET | Refills: 2 | Status: SHIPPED | OUTPATIENT
Start: 2025-07-31

## 2025-07-31 RX ORDER — IBUPROFEN 800 MG/1
800 TABLET, FILM COATED ORAL EVERY 8 HOURS PRN
COMMUNITY
Start: 2025-07-07

## 2025-07-31 NOTE — LETTER
Fax Transmission                                                                                                                                                       Date: July 31, 2025       To:  Candelario Amin MD From: Parish Barton II, MD   Fax:   Fax: [phone]   Phone:   Phone: [phone]     Special Instructions:     Medi-port removal

## 2025-07-31 NOTE — PROGRESS NOTES
Subjective:       Patient ID: Joo Haywood is a 40 y.o. female.    Chief Complaint: Follow-up (Pt has no concerns today)      Diagnosis:  pT2, N1a, M0 stage II invasive ductal carcinoma, grade 3, ER 89.9% positive, NJ 32.6% positive, HER2 Tyshawn negative by IHC (0) with a Ki-67 of 81.8%.    Current Treatment:   Patient needs adjuvant endocrine therapy. TLH/BSO on 07/09/2025. Anastrozole sent to pharmacy today, 07/31/2025.     Treatment History:  Right-sided lumpectomy and sentinel lymph node on 08/09/2024.  Adjuvant chemotherapy in a dose dense fashion every 2 weeks on 10/28/2024.  She has completed her 4 doses and is now receiving weekly Taxol. Completed 12 cycles of weekly taxol on 03/24/2025 .   Radiation to right breast started on 04/17/2025 and completed on 05/22/2025 with Dr. Amanda.    HPI:  40-year-old female who palpated a breast mass in January 2024.  After undergoing some insurance changes, she underwent  screening digital mammogram with tomosynthesis on 06/13/2024 at [facility].  This showed a subtle 2.0 cm round focal asymmetry in the right breast at the 12 o'clock position.  This corresponded to a lump that the patient had palpated.  This was BI-RADS 0, needs additional assessment.  Patient then underwent bilateral breast ultrasound on 06/26/2024, this revealed a mostly round hypoechoic mass with a microlobulated margin and heterogeneous texture located in the right breast at the 11 o'clock position 4 cm from the nipple measuring 2.1 x 1.8 x 2.2 cm.  This was suspicious for malignancy.  There were no other masses or other findings that were suspicious.  The right axilla showed nonspecific benign-appearing lymph nodes without evidence of malignancy.  This was read as BI-RADS 4, biopsy recommended.  Patient underwent a biopsy on 07/01/2024, pathology revealed invasive ductal carcinoma, grade 3.  Breast panel showed ER 89.9% positive, NJ 32.6% positive, HER2 negative by IHC (0) with  a Ki-67 of 81.8%.  The patient was then seen by Dr. Candelario Amin on 07/11/2024, plan was for right-sided lumpectomy and sentinel lymph node.  She had a breast MRI on 07/18/2024 that showed a 2.8 x 2.4 x 2.3 cm round enhancing mass with microlobulated and spiculated margins located in the right breast at the 12 o'clock position 4 cm from the nipple, this was consistent with patient's biopsy-proven invasive ductal carcinoma.  There were no lymph nodes in the right axilla or in the internal mammary chain that were suspicious.  The patient then underwent a right breast lumpectomy with sentinel lymph node on 08/09/2024.  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  Superior margin reexcision was done and showed benign tissue.  There were 8 lymph nodes removed, 1 was positive for metastatic invasive ductal carcinoma.  Final pathologic stage was pT2, N1a, M0 stage II.  I initially saw the patient on 09/04/2024. Oncotype DX returned with a score of 44, greater than 12% chance of distant recurrence at 9 years with an AI or tamoxifen alone, and chemotherapy benefit.     CT chest, abdomen, and pelvis done on 10/02/2024 showed no evidence of metastatic disease in the chest, abdomen, or pelvis.    Echocardiogram done on 10/10/2024 showed an EF of 60-65%.    Adjuvant chemotherapy in a dose dense fashion every 2 weeks on 10/28/2024.  She has completed her 4 doses and is now receiving weekly Taxol. Completed 12 cycles of weekly taxol on 03/24/2025 .    Adjuvant radiation from 04/17/2025 through 05/22/2025.    TLH/BSO on 07/09/2025.    Anastrozole called in on 07/31/2025.    Interval History:   Patient presents to clinic for scheduled follow up appointment.  Overall she is doing well.  She does have some eye swelling from her surgery, she thinks it is from the tape.  Otherwise, no major issues to discuss.      Past Medical History:   Diagnosis Date    ADHD (attention deficit hyperactivity disorder)     Migraine        Past Surgical History:   Procedure Laterality Date    CERVICAL CONIZATION   W/ LASER  2013    FESS, USING COMPUTER-ASSISTED NAVIGATION, WITH NASAL TURBINATE REDUCTION  2021    Dr. Stevens    FUNCTIONAL ENDOSCOPIC SINUS SURGERY (FESS)      MEDIPORT INSERTION, SINGLE Left 10/18/2024    US GUIDED BIOPSY BREAST RIGHT Right 07/01/2024    USG right breast lumpectomy with right axillary sentinel node biopsy Right 08/09/2024     Social History     Socioeconomic History    Marital status: Significant Other   Tobacco Use    Smoking status: Never     Passive exposure: Never    Smokeless tobacco: Never   Substance and Sexual Activity    Alcohol use: Not Currently    Drug use: Never    Sexual activity: Yes     Partners: Male     Birth control/protection: OCP     Social Drivers of Health     Financial Resource Strain: Low Risk  (12/9/2024)    Overall Financial Resource Strain (CARDIA)     Difficulty of Paying Living Expenses: Not very hard   Food Insecurity: No Food Insecurity (12/9/2024)    Hunger Vital Sign     Worried About Running Out of Food in the Last Year: Never true     Ran Out of Food in the Last Year: Never true   Transportation Needs: No Transportation Needs (2/6/2024)    PRAPARE - Transportation     Lack of Transportation (Medical): No     Lack of Transportation (Non-Medical): No   Physical Activity: Inactive (12/9/2024)    Exercise Vital Sign     Days of Exercise per Week: 0 days     Minutes of Exercise per Session: 0 min   Stress: Stress Concern Present (12/9/2024)    [employer] [place] [employer] - Occupational Stress Questionnaire     Feeling of Stress : To some extent   Housing Stability: Low Risk  (2/6/2024)    Housing Stability Vital Sign     Unable to Pay for Housing in the Last Year: No     Number of Places Lived in the Last Year: 1     Unstable Housing in the Last Year: No      Family History   Problem Relation Name Age of Onset    Ulcerative colitis Mother      Heart disease Father Anthony  Rodney     Alcohol abuse Paternal Grandmother Briana clemente       Review of patient's allergies indicates:  No Known Allergies   Review of Systems   Constitutional:  Negative for appetite change and unexpected weight change.   HENT:  Negative for mouth sores.    Respiratory:  Negative for cough and shortness of breath.    Cardiovascular:  Negative for chest pain.   Gastrointestinal:  Negative for abdominal pain and diarrhea.   Genitourinary:  Negative for frequency.   Integumentary:  Negative for rash.   Hematological:  Negative for adenopathy.   Psychiatric/Behavioral:  The patient is nervous/anxious.          Objective:      Physical Exam  Vitals reviewed.   Constitutional:       General: She is not in acute distress.     Appearance: Normal appearance.   HENT:      Head: Normocephalic and atraumatic.      Nose: Nose normal.      Mouth/Throat:      Mouth: Mucous membranes are moist.   Eyes:      Extraocular Movements: Extraocular movements intact.      Conjunctiva/sclera: Conjunctivae normal.   Cardiovascular:      Rate and Rhythm: Normal rate and regular rhythm.      Pulses: Normal pulses.      Heart sounds: Normal heart sounds.   Pulmonary:      Effort: Pulmonary effort is normal.      Breath sounds: Normal breath sounds.   Musculoskeletal:         General: No swelling. Normal range of motion.      Cervical back: Normal range of motion and neck supple.      Right lower leg: No edema.      Left lower leg: No edema.   Skin:     General: Skin is warm and dry.   Neurological:      General: No focal deficit present.      Mental Status: She is alert and oriented to person, place, and time. Mental status is at baseline.   Psychiatric:         Mood and Affect: Mood normal.         Behavior: Behavior normal.         LABS AND IMAGING REVIEWED IN EPIC          Assessment:   pT2, N1a, M0 stage II invasive ductal carcinoma, grade 3, ER 89.9% positive, CA 32.6% positive, HER2 Tyshawn negative by IHC (0) with a Ki-67 of 81.8%.         Plan:       We had a long conversation about the overall good prognosis.  We discussed Oncotype DX results.  Goal of treatment is cure.    CT chest, abdomen, and pelvis done on 10/02/2024 showed no evidence of disease.    Discussed fertility, patient is done with child bearing.    I recommended dose dense AC followed by weekly Taxol. Completed on 03/24/2025. Radiation completed on 05/22/2025.    The patient opted to have TLH/BSO on 07/09/2025.    Anastrozole sent to pharmacy today, 07/31/2025.    Medrol dose pack sent to pharmacy    Baseline Dexa scan ordered    Return to clinic in eight weeks with labs    Labs; CBC, CMP, CEA, CA 15-3    Visit today included increased complexity associated with the care of the episodic problem breast cancer on chemotherapy, addressing and managing the longitudinal care of the patient's breast cancer.       Parish Barton II, MD I, Cindy Perez LPN, acted solely as a scribe for and in the presence of, Dr. Parish Barton who performed the service.

## 2025-08-05 ENCOUNTER — INFUSION (OUTPATIENT)
Dept: INFUSION THERAPY | Facility: HOSPITAL | Age: 40
End: 2025-08-05
Attending: INTERNAL MEDICINE
Payer: COMMERCIAL

## 2025-08-05 VITALS
RESPIRATION RATE: 18 BRPM | OXYGEN SATURATION: 99 % | WEIGHT: 130.69 LBS | DIASTOLIC BLOOD PRESSURE: 82 MMHG | HEIGHT: 62 IN | TEMPERATURE: 98 F | HEART RATE: 98 BPM | BODY MASS INDEX: 24.05 KG/M2 | SYSTOLIC BLOOD PRESSURE: 121 MMHG

## 2025-08-05 DIAGNOSIS — R53.1 WEAKNESS: Primary | ICD-10-CM

## 2025-08-05 PROCEDURE — 25000003 PHARM REV CODE 250: Performed by: NURSE PRACTITIONER

## 2025-08-05 PROCEDURE — 96365 THER/PROPH/DIAG IV INF INIT: CPT

## 2025-08-05 PROCEDURE — 63600175 PHARM REV CODE 636 W HCPCS: Performed by: NURSE PRACTITIONER

## 2025-08-05 PROCEDURE — A4216 STERILE WATER/SALINE, 10 ML: HCPCS | Performed by: NURSE PRACTITIONER

## 2025-08-05 RX ORDER — SODIUM CHLORIDE 0.9 % (FLUSH) 0.9 %
10 SYRINGE (ML) INJECTION
Status: DISCONTINUED | OUTPATIENT
Start: 2025-08-05 | End: 2025-08-05 | Stop reason: HOSPADM

## 2025-08-05 RX ORDER — HEPARIN 100 UNIT/ML
500 SYRINGE INTRAVENOUS
OUTPATIENT
Start: 2025-08-05

## 2025-08-05 RX ORDER — HEPARIN 100 UNIT/ML
500 SYRINGE INTRAVENOUS
Status: DISCONTINUED | OUTPATIENT
Start: 2025-08-05 | End: 2025-08-05 | Stop reason: HOSPADM

## 2025-08-05 RX ORDER — SODIUM CHLORIDE 0.9 % (FLUSH) 0.9 %
10 SYRINGE (ML) INJECTION
OUTPATIENT
Start: 2025-08-05

## 2025-08-05 RX ADMIN — FOLIC ACID: 5 INJECTION, SOLUTION INTRAMUSCULAR; INTRAVENOUS; SUBCUTANEOUS at 11:08

## 2025-08-05 RX ADMIN — HEPARIN 500 UNITS: 100 SYRINGE at 12:08

## 2025-08-05 RX ADMIN — Medication 10 ML: at 12:08

## 2025-08-11 DIAGNOSIS — F41.1 ANXIETY STATE: ICD-10-CM

## 2025-08-11 RX ORDER — ALPRAZOLAM 0.5 MG/1
TABLET ORAL
Qty: 90 TABLET | Refills: 2 | Status: SHIPPED | OUTPATIENT
Start: 2025-08-11

## 2025-08-13 ENCOUNTER — PATIENT MESSAGE (OUTPATIENT)
Dept: HEMATOLOGY/ONCOLOGY | Facility: CLINIC | Age: 40
End: 2025-08-13
Payer: COMMERCIAL

## 2025-08-13 ENCOUNTER — PATIENT MESSAGE (OUTPATIENT)
Dept: FAMILY MEDICINE | Facility: CLINIC | Age: 40
End: 2025-08-13
Payer: COMMERCIAL

## 2025-08-14 DIAGNOSIS — F90.0 ATTENTION DEFICIT HYPERACTIVITY DISORDER (ADHD), PREDOMINANTLY INATTENTIVE TYPE: ICD-10-CM

## 2025-08-14 DIAGNOSIS — C50.911 INFILTRATING DUCTAL CARCINOMA OF RIGHT BREAST: Primary | ICD-10-CM

## 2025-08-14 RX ORDER — DEXTROAMPHETAMINE SACCHARATE, AMPHETAMINE ASPARTATE, DEXTROAMPHETAMINE SULFATE AND AMPHETAMINE SULFATE 5; 5; 5; 5 MG/1; MG/1; MG/1; MG/1
1 TABLET ORAL 3 TIMES DAILY
Qty: 90 TABLET | Refills: 0 | Status: SHIPPED | OUTPATIENT
Start: 2025-08-14

## 2025-08-15 ENCOUNTER — HOSPITAL ENCOUNTER (OUTPATIENT)
Dept: RADIOLOGY | Facility: HOSPITAL | Age: 40
Discharge: HOME | End: 2025-08-15
Attending: INTERNAL MEDICINE
Payer: COMMERCIAL

## 2025-08-15 DIAGNOSIS — C77.3 MALIGNANT NEOPLASM METASTATIC TO LYMPH NODE OF AXILLA: ICD-10-CM

## 2025-08-15 DIAGNOSIS — C50.911 INFILTRATING DUCTAL CARCINOMA OF RIGHT BREAST: ICD-10-CM

## 2025-08-15 DIAGNOSIS — Z79.811 LONG TERM (CURRENT) USE OF AROMATASE INHIBITORS: ICD-10-CM

## 2025-08-15 DIAGNOSIS — C50.911 INFILTRATING DUCTAL CARCINOMA OF RIGHT BREAST: Primary | ICD-10-CM

## 2025-08-15 PROCEDURE — 77080 DXA BONE DENSITY AXIAL: CPT | Mod: TC
